# Patient Record
Sex: MALE | Race: BLACK OR AFRICAN AMERICAN | NOT HISPANIC OR LATINO | Employment: FULL TIME | URBAN - METROPOLITAN AREA
[De-identification: names, ages, dates, MRNs, and addresses within clinical notes are randomized per-mention and may not be internally consistent; named-entity substitution may affect disease eponyms.]

---

## 2017-01-02 ENCOUNTER — THERAPY VISIT (OUTPATIENT)
Dept: PHYSICAL THERAPY | Facility: CLINIC | Age: 42
End: 2017-01-02
Payer: COMMERCIAL

## 2017-01-02 DIAGNOSIS — M54.50 ACUTE BILATERAL LOW BACK PAIN WITHOUT SCIATICA: Primary | ICD-10-CM

## 2017-01-02 PROCEDURE — 97110 THERAPEUTIC EXERCISES: CPT | Mod: GP | Performed by: PHYSICAL THERAPIST

## 2017-01-02 PROCEDURE — 97161 PT EVAL LOW COMPLEX 20 MIN: CPT | Mod: GP | Performed by: PHYSICAL THERAPIST

## 2017-01-02 NOTE — PROGRESS NOTES
Subjective:    HPI Comments: Jeanmarie is here for his LBP/lower thoracic pain that started mid December.  He has seen Rene Ahn DC for a couple visits which has helped and is now coming to PT to learn exercises and techniques to avoid future flare ups.      Jeanmarie Mckinley is a 41 year old male with a lumbar condition.  Condition occurred with:  Insidious onset.  Condition occurred: for unknown reasons.    12/2016 -  Most recent exacerbation rolled over in bed and had acute LBP.    Patient reports pain:  Central lumbar spine and upper lumbar spine.    Pain is described as aching  and reported as 2/10.      and relieved by rest.  Since onset symptoms are unchanged.        General health as reported by patient is good.  Past medical history: asthma; diabetes Type II.        Current occupation .                                  Objective:    System         Lumbar/SI Evaluation  ROM:    AROM Lumbar:   Flexion:              Wnl  Ext:                    Mod restriction   Side Bend:        Left:  Wnl    Right:  Wnl  Rotation:           Left:  Wnl    Right:  Wnl  Side Glide:        Left:  Wnl    Right:  Wnl        Strength: weak lower abdominal and hip musculature  Lumbar Myotomes:    T12-L3 (Hip Flex):  Left: 5    Right: 5  L2-4 (Quads):  Left:  5    Right:  5  L4 (Ankle DF):  Left:  5    Right:  5  L5 (Great Toe Ext): Left: 5    Right: 5   S1 (Toe Raise):  Left: 5    Right: 5        Neural Tension/Mobility:      Left side:SLR w/DF or Slump  negative.     Right side:   SLR w/DF or Slump  negative.   Lumbar Palpation:      Tenderness not present at Left:    PSIS    Tenderness not present at Right:  PSIS                                                                                          Musculoskeletal:        Back:        ROS    Assessment/Plan:      Patient is a 41 year old male with lumbar complaints.    Patient has the following significant findings with corresponding treatment plan.                 Diagnosis 1:  LBP  Pain -  self management, education, directional preference exercise and home program  Decreased ROM/flexibility - manual therapy and therapeutic exercise  Decreased joint mobility - manual therapy and therapeutic exercise  Decreased strength - therapeutic exercise and therapeutic activities  Impaired muscle performance - neuro re-education  Decreased function - therapeutic activities  Impaired posture - neuro re-education    Therapy Evaluation Codes:   1) History comprised of:   Personal factors that impact the plan of care:      Past/current experiences.    Comorbidity factors that impact the plan of care are:      Overweight.     Medications impacting care: None.  2) Examination of Body Systems comprised of:   Body structures and functions that impact the plan of care:      Lumbar spine.   Activity limitations that impact the plan of care are:      Lifting.   Clinical presentation characteristics are:    Stable/Uncomplicated.  3) Presentation comprised of:   Presentation scored as Low complexity with uncomplicated characteristics..  4) Decision-Making    Low complexity using standardized patient assessment instrument and/or measureable assessment of functional outcome.  Cumulative Therapy Evaluation is: Low complexity.    Previous and current functional limitations:  (See Goal Flow Sheet for this information)    Short term and Long term goals: (See Goal Flow Sheet for this information)     Communication ability:  Patient appears to be able to clearly communicate and understand verbal and written communication and follow directions correctly.  Treatment Explanation - The following has been discussed with the patient:   RX ordered/plan of care  Anticipated outcomes  Possible risks and side effects  This patient would benefit from PT intervention to resume normal activities.   Rehab potential is good.    Frequency:  1 X week, once daily  Duration:  for 6   weeks  Discharge Plan:  Achieve all  LTG.  Independent in home treatment program.  Reach maximal therapeutic benefit.    Please refer to the daily flowsheet for treatment today, total treatment time and time spent performing 1:1 timed codes.

## 2017-01-03 NOTE — PROGRESS NOTES
Subjective:                                       Pertinent medical history includes:  Diabetes and asthma.    Other surgeries include:  Other (Broken Bone).  Current medications:  High blood pressure medication.  Current occupation    .    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            Oswestry Score: 2 %                 Objective:    System    Physical Exam    General     ROS

## 2017-01-09 ENCOUNTER — THERAPY VISIT (OUTPATIENT)
Dept: PHYSICAL THERAPY | Facility: CLINIC | Age: 42
End: 2017-01-09
Payer: COMMERCIAL

## 2017-01-09 DIAGNOSIS — M54.50 ACUTE BILATERAL LOW BACK PAIN WITHOUT SCIATICA: Primary | ICD-10-CM

## 2017-01-09 PROCEDURE — 97110 THERAPEUTIC EXERCISES: CPT | Mod: GP | Performed by: PHYSICAL THERAPIST

## 2017-01-17 ENCOUNTER — THERAPY VISIT (OUTPATIENT)
Dept: PHYSICAL THERAPY | Facility: CLINIC | Age: 42
End: 2017-01-17
Payer: COMMERCIAL

## 2017-01-17 DIAGNOSIS — M54.50 ACUTE BILATERAL LOW BACK PAIN WITHOUT SCIATICA: Primary | ICD-10-CM

## 2017-01-17 PROCEDURE — 97112 NEUROMUSCULAR REEDUCATION: CPT | Mod: GP | Performed by: PHYSICAL THERAPIST

## 2017-01-17 PROCEDURE — 97110 THERAPEUTIC EXERCISES: CPT | Mod: GP | Performed by: PHYSICAL THERAPIST

## 2017-02-12 DIAGNOSIS — E11.9 DIABETES MELLITUS TYPE 2, CONTROLLED (H): ICD-10-CM

## 2017-02-13 RX ORDER — PEN NEEDLE, DIABETIC 32GX 5/32"
NEEDLE, DISPOSABLE MISCELLANEOUS
Refills: 0
Start: 2017-02-13

## 2017-02-13 NOTE — TELEPHONE ENCOUNTER
Pending Prescriptions:                       Disp   Refills    BD VICENTE U/F 32G X 4 MM insulin pen needle*       0            Sig: USE ONCE DAILY             Last Written Prescription Date: 9/22/16  Last Fill Quantity: 1, # refills: prn  Last Office Visit with G, P or [facility] prescribing provider:  10/12/16 with Dr. Blanquita Butler, [facility]           BP Readings from Last 3 Encounters:   10/12/16 116/69   09/22/16 122/75   04/14/16 126/78     Lab Results   Component Value Date    MICROL 36 04/05/2016     No results found for: MICROALBUMIN  Creatinine   Date Value Ref Range Status   12/26/2016 1.07 0.66 - 1.25 mg/dL Final   ]  GFR Estimate   Date Value Ref Range Status   12/26/2016 76 >60 mL/min/1.7m2 Final     Comment:     Non  GFR Calc   09/22/2016 84 >60 mL/min/1.7m2 Final     Comment:     Non  GFR Calc   04/05/2016 >90  Non  GFR Calc   >60 mL/min/1.7m2 Final     GFR Estimate If Black   Date Value Ref Range Status   12/26/2016 >90   GFR Calc   >60 mL/min/1.7m2 Final   09/22/2016 >90   GFR Calc   >60 mL/min/1.7m2 Final   04/05/2016 >90   GFR Calc   >60 mL/min/1.7m2 Final     Lab Results   Component Value Date    CHOL 174 12/26/2016     Lab Results   Component Value Date    HDL 36 12/26/2016     Lab Results   Component Value Date    LDL 86 12/26/2016    LDL 92 04/05/2016     Lab Results   Component Value Date    TRIG 258 12/26/2016     No results found for: CHOLHDLRATIO  No results found for: AST  No results found for: ALT  Lab Results   Component Value Date    A1C 5.9 12/26/2016    A1C 8.1 09/22/2016    A1C 6.9 04/05/2016     Potassium   Date Value Ref Range Status   12/26/2016 4.3 3.4 - 5.3 mmol/L Final

## 2017-03-23 ENCOUNTER — OFFICE VISIT (OUTPATIENT)
Dept: PHARMACY | Facility: CLINIC | Age: 42
End: 2017-03-23
Payer: COMMERCIAL

## 2017-03-23 VITALS — WEIGHT: 227.2 LBS | DIASTOLIC BLOOD PRESSURE: 66 MMHG | BODY MASS INDEX: 32.6 KG/M2 | SYSTOLIC BLOOD PRESSURE: 118 MMHG

## 2017-03-23 DIAGNOSIS — E78.2 MIXED HYPERLIPIDEMIA: ICD-10-CM

## 2017-03-23 DIAGNOSIS — E63.9 NUTRITIONAL DEFICIENCY: ICD-10-CM

## 2017-03-23 DIAGNOSIS — E11.65 TYPE 2 DIABETES MELLITUS WITH HYPERGLYCEMIA, UNSPECIFIED LONG TERM INSULIN USE STATUS: ICD-10-CM

## 2017-03-23 DIAGNOSIS — I10 BENIGN ESSENTIAL HYPERTENSION: ICD-10-CM

## 2017-03-23 DIAGNOSIS — J45.998 ASTHMA, PERSISTENT CONTROLLED: ICD-10-CM

## 2017-03-23 DIAGNOSIS — E11.65 TYPE 2 DIABETES MELLITUS WITH HYPERGLYCEMIA, UNSPECIFIED LONG TERM INSULIN USE STATUS: Primary | ICD-10-CM

## 2017-03-23 PROCEDURE — 82043 UR ALBUMIN QUANTITATIVE: CPT | Performed by: FAMILY MEDICINE

## 2017-03-23 PROCEDURE — 99607 MTMS BY PHARM ADDL 15 MIN: CPT | Performed by: PHARMACIST

## 2017-03-23 PROCEDURE — 99605 MTMS BY PHARM NP 15 MIN: CPT | Performed by: PHARMACIST

## 2017-03-23 RX ORDER — OMEGA-3-ACID ETHYL ESTERS 1 G/1
1 CAPSULE, LIQUID FILLED ORAL DAILY
COMMUNITY
End: 2019-02-21

## 2017-03-23 NOTE — MR AVS SNAPSHOT
After Visit Summary   3/23/2017    Jeanmarie Mckinley    MRN: [id number]           Patient Information     Date Of Birth          1975        Visit Information        Provider Department      3/23/2017 4:00 PM Faith Montague, [facility] MTM        Today's Diagnoses     Type 2 diabetes mellitus with hyperglycemia, unspecified long term insulin use status (H)    -  1    Benign essential hypertension        Mixed hyperlipidemia        Nutritional deficiency        Asthma, persistent controlled          Care Instructions    Recommendations from today's MTM visit:                                                    MTM (medication therapy management) is a service provided by a clinical pharmacist designed to help you get the most of out of your medicines.   Today we reviewed what your medicines are for, how to know if they are working, that your medicines are safe and how to make your medicine regimen as easy as possible.     1. Check protein in urine today. We will wait until 6 months for the A1c.    2. Try to use Advair in evening and continue to rinse your mouth after using.         Next MTM visit: 6 months, sooner if needed    To schedule another MTM appointment, please call the clinic directly or you may call the MTM scheduling line at [phone] or toll-free at [phone].     My Clinical Pharmacist's contact information:                                                      It was a pleasure seeing you today!  Please feel free to contact me with any questions or concerns you have.      Lianne Hurley, PharmD  [facility] Resident  [phone]      You may receive a survey about the MTM services you received.  I would appreciate your feedback to help me serve you better in the future. Please fill it out and return it when you can. Your comments will be anonymous.                    Follow-ups after your visit        Who to contact     If you have questions or need  follow up information about today's clinic visit or your schedule please contact [facility] directly at [phone].  Normal or non-critical lab and imaging results will be communicated to you by MyChart, letter or phone within 4 business days after the clinic has received the results. If you do not hear from us within 7 days, please contact the clinic through MyChart or phone. If you have a critical or abnormal lab result, we will notify you by phone as soon as possible.  Submit refill requests through [email or web address] or call your pharmacy and they will forward the refill request to us. Please allow 3 business days for your refill to be completed.          Additional Information About Your Visit        QianmiharFrequent Browser Information     [email or web address] gives you secure access to your electronic health record. If you see a primary care provider, you can also send messages to your care team and make appointments. If you have questions, please call your primary care clinic.  If you do not have a primary care provider, please call [phone] and they will assist you.        Care EveryWhere ID     This is your Care EveryWhere ID. This could be used by other organizations to access your [place] medical records  [id number]        Your Vitals Were     BMI (Body Mass Index)                   32.6 kg/m2            Blood Pressure from Last 3 Encounters:   03/23/17 118/66   10/12/16 116/69   09/22/16 122/75    Weight from Last 3 Encounters:   03/23/17 227 lb 3.2 oz (103.1 kg)   10/12/16 236 lb 1.6 oz (107.1 kg)   09/22/16 237 lb 1.6 oz (107.5 kg)               Primary Care Provider Office Phone # Fax #    Krista Juares [phone]       [facility] [address] [address]        Thank you!     Thank you for choosing [facility]  for your care. Our goal is always to provide you with excellent care. Hearing back from our patients is one way we can continue to improve  our services. Please take a few minutes to complete the written survey that you may receive in the mail after your visit with us. Thank you!             Your Updated Medication List - Protect others around you: Learn how to safely use, store and throw away your medicines at www.disposemymeds.org.          This list is accurate as of: 3/23/17  4:45 PM.  Always use your most recent med list.                   Brand Name Dispense Instructions for use    ASPIRIN NOT PRESCRIBED    INTENTIONAL     Antiplatelet medication not prescribed intentionally due to Not indicated based on age       fluticasone-salmeterol 250-50 MCG/DOSE diskus inhaler    ADVAIR DISKUS    3 Inhaler    Inhale 1 puff into the lungs 2 times daily       FREESTYLE LITE test strip   Generic drug:  blood glucose monitoring     200 each    Test twice daily       insulin pen needle 32G X 4 MM    BD VICENTE U/F    1 each    Use once daily as directed.       liraglutide 18 MG/3ML soln    VICTOZA PEN    6 mL    1.2mg under the skin daily.       lisinopril 10 MG tablet    PRINIVIL/ZESTRIL    90 tablet    TAKE 1 TABLET BY MOUTH DAILY (EVERY 24 HOURS).       metFORMIN 500 MG 24 hr tablet    GLUCOPHAGE-XR    360 tablet    Take 2 tablets (1,000 mg) by mouth 2 times daily (with meals)       MULTIVITAMIN & MINERAL PO          omega-3 acid ethyl esters 1 G capsule    Lovaza         PROBIOTIC ACIDOPHILUS PO          simvastatin 40 MG tablet    ZOCOR    90 tablet    Take 1 tablet (40 mg) by mouth At Bedtime       VENTOLIN  (90 BASE) MCG/ACT Inhaler   Generic drug:  albuterol     1 each    Inhale 1 puff into the lungs every 6 hours as needed for shortness of breath / dyspnea or wheezing

## 2017-03-23 NOTE — PROGRESS NOTES
SUBJECTIVE/OBJECTIVE:                                                    Jeanmarie Mckinley is a 41 year old male coming in for a follow-up visit for Medication Therapy Management.  He was referred to me from Dr. Johnson.     Chief Complaint: Follow up from visit with Faith Montague, AndersD on 9/22/16.      Tobacco: No tobacco use   Alcohol: Less than 1 beverages / week    Medication Adherence: no issues reported    Diabetes:  Pt currently taking metformin 2,000 XR daily, Victoza 1.2mg daily. Pt is not experiencing side effects.  SMBG: not consistently. Usually tests 3 times in one day on days he tests.  Ranges (patient reported): did not bring glucometer today; usually around 100 mg/dL; even after meals.  Symptoms of low blood sugar? none. Frequency of hypoglycemia? never.  Recent symptoms of high blood sugar? none  Eye exam: up to date  Foot exam: due  Microalbumin is not < 30 mg/g. Pt is taking an ACEi/ARB.  Aspirin: Not taking due to patient age less than 50  Diet/Exercise: eliminated sugar, carbs, pasta for last one month. Is continuing to exercise.   Patient is pleased with his blood sugars and is motivated to keep up the changes he has made.     Asthma:  Current asthma medications: Fluticasone+Salmeterol (Advair) 250-50 mcg/dose prescribed BID - using Advair not always BID- sometimes missing bedtime dose, Ventolin PRN - patient estimates use 1-2 times in last couple months.   Pt rinses their mouth after using steroid inhaler. Asthma triggers include: upper respiratory infections and exercise or sports.   Pt reports the following symptoms: none  Completed ACT today.     AAP on file: YES  ACT Total Scores 4/5/2016 10/12/2016 10/12/2016   ACT TOTAL SCORE (Goal Greater than or Equal to 20) 21 22 22   In the past 12 months, how many times did you visit the emergency room for your asthma without being admitted to the hospital? 0 0 0   In the past 12 months, how many times were you hospitalized overnight  because of your asthma? 0 0 0       Hypertension: Current medications include lisinopril 10 mg daily.  Patient does not self-monitor BP.  Patient reports no current medication side effects.    Hyperlipidemia: Current therapy includes simvastatin 40 mg once daily.  Pt reports no significant myalgias or other side effects.   The 10-year ASCVD risk score (Rosy BOLIVAR Jr, et al., 2013) is: 9%    Values used to calculate the score:      Age: 41 years      Sex: Male      Is Non- : Yes      Diabetic: Yes      Tobacco smoker: No      Systolic Blood Pressure: 116 mmHg      Is BP treated: Yes      HDL Cholesterol: 36 mg/dL      Total Cholesterol: 174 mg/dL    Supplements: Currently taking MVI daily, proboitic daily, Omega 3 fatty acids (unknown dose) daily. Patient reports no side effects.    Current labs include:  BP Readings from Last 3 Encounters:   10/12/16 116/69   09/22/16 122/75   04/14/16 126/78     Today's Vitals: /66  Wt 227 lb 3.2 oz (103.1 kg)  BMI 32.6 kg/m2  Lab Results   Component Value Date    A1C 5.9 12/26/2016   .  Lab Results   Component Value Date    CHOL 174 12/26/2016     Lab Results   Component Value Date    TRIG 258 12/26/2016     Lab Results   Component Value Date    HDL 36 12/26/2016     Lab Results   Component Value Date    LDL 86 12/26/2016       Liver Function Studies - No results for input(s): PROTTOTAL, ALBUMIN, BILITOTAL, ALKPHOS, AST, ALT, BILIDIRECT in the last 97910 hours.    Lab Results   Component Value Date    UCRR 116 04/05/2016    MICROL 36 04/05/2016    UMALCR 30.86 (H) 04/05/2016       Last Basic Metabolic Panel:  Lab Results   Component Value Date     12/26/2016      Lab Results   Component Value Date    POTASSIUM 4.3 12/26/2016     Lab Results   Component Value Date    CHLORIDE 104 12/26/2016     Lab Results   Component Value Date    BUN 16 12/26/2016     Lab Results   Component Value Date    CR 1.07 12/26/2016     GFR Estimate   Date Value Ref  Range Status   12/26/2016 76 >60 mL/min/1.7m2 Final     Comment:     Non  GFR Calc   09/22/2016 84 >60 mL/min/1.7m2 Final     Comment:     Non  GFR Calc   04/05/2016 >90  Non  GFR Calc   >60 mL/min/1.7m2 Final     GFR Estimate If Black   Date Value Ref Range Status   12/26/2016 >90   GFR Calc   >60 mL/min/1.7m2 Final   09/22/2016 >90   GFR Calc   >60 mL/min/1.7m2 Final   04/05/2016 >90   GFR Calc   >60 mL/min/1.7m2 Final     TSH   Date Value Ref Range Status   04/05/2016 0.34 (L) 0.40 - 4.00 mU/L Final   ]    Most Recent Immunizations   Administered Date(s) Administered     Influenza (IIV3) 10/12/2016     Pneumococcal (PCV 13) 10/12/2016     TD (ADULT, 7+) 04/05/2016     ASSESSMENT:                                                    Current medications were reviewed today as discussed above.      Medication Adherence: needs improvement - see below    Diabetes: Stable. Patient is meeting A1c goal of < 7%. OK to wait another 3 months to check A1c as patient is well controlled.     Asthma: Needs Improvement. Up to date and at goal of ACT > or equal to 20. Needs improvement with compliance.    Hypertension: Needs Improvement. Patient is meeting BP goal of < 140/90mmHg. Patient would benefit from updated microalbumin today.     Hyperlipidemia: Stable. Pt is on moderate intensity statin which is indicated based on 2013 ACC/AHA guidelines for lipid management.      Supplements: Stable       PLAN:                                                      1. Lab today: Microalbumin  2. Reminded patient to take Advair BID as prescribed.     I spent 30 minutes with this patient today.  All changes were made via verbal approval with Dr. Praful Rincon. A copy of the visit note was provided to the patient's primary care provider.     Will follow up in 6 months.   Patient prefers lab result via [employer].     The patient was given a  summary of these recommendations as an after visit summary.    Lianne Hurley, PharmD  [facility] Resident  [phone]    Tie-in: Jeanmarie Mckinley was seen independently by Dr. Hurley. I have reviewed the assessment and plan. Faith Montague, AndersD, VITO, BCACP

## 2017-03-23 NOTE — PATIENT INSTRUCTIONS
Recommendations from today's MTM visit:                                                    MTM (medication therapy management) is a service provided by a clinical pharmacist designed to help you get the most of out of your medicines.   Today we reviewed what your medicines are for, how to know if they are working, that your medicines are safe and how to make your medicine regimen as easy as possible.     1. Check protein in urine today. We will wait until 6 months for the A1c.    2. Try to use Advair in evening and continue to rinse your mouth after using.         Next MTM visit: 6 months, sooner if needed    To schedule another MTM appointment, please call the clinic directly or you may call the MTM scheduling line at [phone] or toll-free at [phone].     My Clinical Pharmacist's contact information:                                                      It was a pleasure seeing you today!  Please feel free to contact me with any questions or concerns you have.      Lianne Hurley, PharmD  [facility] Res[facility]  [phone]      You may receive a survey about the MTM services you received.  I would appreciate your feedback to help me serve you better in the future. Please fill it out and return it when you can. Your comments will be anonymous.

## 2017-03-24 LAB
CREAT UR-MCNC: 96 MG/DL
MICROALBUMIN UR-MCNC: 13 MG/L
MICROALBUMIN/CREAT UR: 13.3 MG/G CR (ref 0–17)

## 2017-03-24 ASSESSMENT — ASTHMA QUESTIONNAIRES: ACT_TOTALSCORE: 24

## 2017-04-14 DIAGNOSIS — J45.998 ASTHMA, PERSISTENT CONTROLLED: ICD-10-CM

## 2017-04-17 NOTE — TELEPHONE ENCOUNTER
Pending Prescriptions:                       Disp   Refills    VENTOLIN  (90 BASE) MCG/ACT Inhale*18 Inh*1            Sig: INHALE 2 PUFFS BY MOUTH EVERY 4 HOURS AS NEEDED.    Last Written Prescription Date: 4/5/16  Last Fill Quantity: 1, # refills: 11    Last Office Visit with [facility], [facility] or [facility] prescribing provider:  10/12/2016   Future Office Visit:       Date of Last Asthma Action Plan Letter:   Asthma Action Plan Q1 Year    Topic Date Due     Asthma Action Plan - yearly  04/05/2017      Asthma Control Test:   ACT Total Scores 3/23/2017   ACT TOTAL SCORE (Goal Greater than or Equal to 20) 24   In the past 12 months, how many times did you visit the emergency room for your asthma without being admitted to the hospital? 0   In the past 12 months, how many times were you hospitalized overnight because of your asthma? 0       Date of Last Spirometry Test:   No results found for this or any previous visit.

## 2017-04-18 RX ORDER — ALBUTEROL SULFATE 90 UG/1
AEROSOL, METERED RESPIRATORY (INHALATION)
Qty: 1 INHALER | Refills: 2 | Status: SHIPPED | OUTPATIENT
Start: 2017-04-18 | End: 2019-07-15

## 2017-04-18 NOTE — TELEPHONE ENCOUNTER
Prescription approved per [facility] [facility].  Fatemeh OSORIO RN

## 2017-05-24 ENCOUNTER — OFFICE VISIT (OUTPATIENT)
Dept: FAMILY MEDICINE | Facility: CLINIC | Age: 42
End: 2017-05-24
Payer: COMMERCIAL

## 2017-05-24 VITALS
OXYGEN SATURATION: 99 % | BODY MASS INDEX: 30.18 KG/M2 | SYSTOLIC BLOOD PRESSURE: 128 MMHG | WEIGHT: 210.8 LBS | RESPIRATION RATE: 15 BRPM | HEART RATE: 83 BPM | HEIGHT: 70 IN | DIASTOLIC BLOOD PRESSURE: 76 MMHG | TEMPERATURE: 98.3 F

## 2017-05-24 DIAGNOSIS — J45.998 UNCONTROLLED PERSISTENT ASTHMA: ICD-10-CM

## 2017-05-24 DIAGNOSIS — J31.0 CHRONIC RHINITIS: Primary | ICD-10-CM

## 2017-05-24 DIAGNOSIS — E11.65 TYPE 2 DIABETES MELLITUS WITH HYPERGLYCEMIA, WITHOUT LONG-TERM CURRENT USE OF INSULIN (H): ICD-10-CM

## 2017-05-24 DIAGNOSIS — Z91.013 ALLERGY TO FISH: ICD-10-CM

## 2017-05-24 DIAGNOSIS — E78.2 MIXED HYPERLIPIDEMIA: ICD-10-CM

## 2017-05-24 LAB — HBA1C MFR BLD: 5.2 % (ref 4.3–6)

## 2017-05-24 PROCEDURE — 83036 HEMOGLOBIN GLYCOSYLATED A1C: CPT | Performed by: FAMILY MEDICINE

## 2017-05-24 PROCEDURE — 86003 ALLG SPEC IGE CRUDE XTRC EA: CPT | Mod: 59 | Performed by: FAMILY MEDICINE

## 2017-05-24 PROCEDURE — 80048 BASIC METABOLIC PNL TOTAL CA: CPT | Performed by: FAMILY MEDICINE

## 2017-05-24 PROCEDURE — 36415 COLL VENOUS BLD VENIPUNCTURE: CPT | Performed by: FAMILY MEDICINE

## 2017-05-24 PROCEDURE — 99214 OFFICE O/P EST MOD 30 MIN: CPT | Performed by: FAMILY MEDICINE

## 2017-05-24 PROCEDURE — 80061 LIPID PANEL: CPT | Performed by: FAMILY MEDICINE

## 2017-05-24 PROCEDURE — 99207 C FOOT EXAM  NO CHARGE: CPT | Performed by: FAMILY MEDICINE

## 2017-05-24 RX ORDER — MONTELUKAST SODIUM 10 MG/1
10 TABLET ORAL AT BEDTIME
Qty: 30 TABLET | Refills: 3 | Status: SHIPPED | OUTPATIENT
Start: 2017-05-24 | End: 2017-11-02

## 2017-05-24 RX ORDER — LORATADINE 10 MG/1
10 TABLET ORAL DAILY
Qty: 30 TABLET | Refills: 1 | Status: SHIPPED | OUTPATIENT
Start: 2017-05-24 | End: 2017-11-02

## 2017-05-24 RX ORDER — FLUTICASONE PROPIONATE 50 MCG
1-2 SPRAY, SUSPENSION (ML) NASAL DAILY
Qty: 1 G | Refills: 1 | Status: SHIPPED | OUTPATIENT
Start: 2017-05-24 | End: 2017-11-02

## 2017-05-24 NOTE — MR AVS SNAPSHOT
After Visit Summary   5/24/2017    Jeanmarie Mckinley    MRN: [id number]           Patient Information     Date Of Birth          1975        Visit Information        Provider Department      5/24/2017 10:00 AM Krista Juares MD [facility]        Today's Diagnoses     Chronic rhinitis    -  1    Uncontrolled persistent asthma        Mixed hyperlipidemia        Type 2 diabetes mellitus with hyperglycemia, without long-term current use of insulin (H)        Allergy to fish          Care Instructions    Increase the dose of advair from  250 to now 500- use new inhaler- twice daily   Wash mouth after use    Plan is to go back to lower dose in a moth- fp in 4-6 weeks  Add singular bedtime or once daily- that may improve asthma     Add flonase nasal spray and calritin once daily           Follow-ups after your visit        Follow-up notes from your care team     Return in about 4 weeks (around 6/21/2017).      Who to contact     If you have questions or need follow up information about today's clinic visit or your schedule please contact [facility] directly at [phone].  Normal or non-critical lab and imaging results will be communicated to you by Qazzowhart, letter or phone within 4 business days after the clinic has received the results. If you do not hear from us within 7 days, please contact the clinic through [facility] or phone. If you have a critical or abnormal lab result, we will notify you by phone as soon as possible.  Submit refill requests through [facility] or call your pharmacy and they will forward the refill request to us. Please allow 3 business days for your refill to be completed.          Additional Information About Your Visit        MyChart Information     [facility] gives you secure access to your electronic health record. If you see a primary care provider, you can also send messages to your care team and make appointments. If you have questions, please call  "your primary care clinic.  If you do not have a primary care provider, please call [phone] and they will assist you.        Care EveryWhere ID     This is your Care EveryWhere ID. This could be used by other organizations to access your [place] medical records  [id number]        Your Vitals Were     Pulse Temperature Respirations Height Pulse Oximetry BMI (Body Mass Index)    83 98.3  F (36.8  C) (Oral) 15 5' 10\" (1.778 m) 99% 30.25 kg/m2       Blood Pressure from Last 3 Encounters:   05/24/17 128/76   03/23/17 118/66   10/12/16 116/69    Weight from Last 3 Encounters:   05/24/17 210 lb 12.8 oz (95.6 kg)   03/23/17 227 lb 3.2 oz (103.1 kg)   10/12/16 236 lb 1.6 oz (107.1 kg)              We Performed the Following     Allergen catfish IgE     Allergen salmon IgE     Allergen Trout IgE     Allergen Walleye Ray IgE     Asthma Action Plan (AAP)     Basic metabolic panel     C FOOT EXAM  NO CHARGE     Hemoglobin A1c     Lipid panel reflex to direct LDL          Today's Medication Changes          These changes are accurate as of: 5/24/17 11:59 PM.  If you have any questions, ask your nurse or doctor.               Start taking these medicines.        Dose/Directions    fluticasone 50 MCG/ACT spray   Commonly known as:  FLONASE   Used for:  Chronic rhinitis   Started by:  Krista Juares MD        Dose:  1-2 spray   Spray 1-2 sprays into both nostrils daily   Quantity:  1 g   Refills:  1       loratadine 10 MG tablet   Commonly known as:  CLARITIN   Used for:  Chronic rhinitis   Started by:  Krista Juares MD        Dose:  10 mg   Take 1 tablet (10 mg) by mouth daily   Quantity:  30 tablet   Refills:  1       montelukast 10 MG tablet   Commonly known as:  SINGULAIR   Used for:  Chronic rhinitis   Started by:  Krista Juares MD        Dose:  10 mg   Take 1 tablet (10 mg) by mouth At Bedtime   Quantity:  30 tablet   Refills:  3         These medicines have changed or have updated prescriptions.  "       Dose/Directions    * fluticasone-salmeterol 250-50 MCG/DOSE diskus inhaler   Commonly known as:  ADVAIR DISKUS   This may have changed:  Another medication with the same name was added. Make sure you understand how and when to take each.   Used for:  Asthma, persistent controlled   Changed by:  Faith Montague [facility]        Dose:  1 puff   Inhale 1 puff into the lungs 2 times daily   Quantity:  3 Inhaler   Refills:  1       * fluticasone-salmeterol 500-50 MCG/DOSE diskus inhaler   Commonly known as:  ADVAIR   This may have changed:  You were already taking a medication with the same name, and this prescription was added. Make sure you understand how and when to take each.   Used for:  Uncontrolled persistent asthma   Changed by:  Krista Juares MD        Dose:  1 puff   Inhale 1 puff into the lungs 2 times daily   Quantity:  1 Inhaler   Refills:  1       * Notice:  This list has 2 medication(s) that are the same as other medications prescribed for you. Read the directions carefully, and ask your doctor or other care provider to review them with you.         Where to get your medicines      These medications were sent to [facility]/pharmacy #[id number] - [place], [place]  [id number] [facility] [address]     Phone:  [phone]     fluticasone 50 MCG/ACT spray    fluticasone-salmeterol 500-50 MCG/DOSE diskus inhaler    loratadine 10 MG tablet    montelukast 10 MG tablet                Primary Care Provider Office Phone # Fax #    Krista Juares [phone]       [facility] [address] [address]        Thank you!     Thank you for choosing [facility]  for your care. Our goal is always to provide you with excellent care. Hearing back from our patients is one way we can continue to improve our services. Please take a few minutes to complete the written survey that you may receive in the mail after your visit with us. Thank you!              Your Updated Medication List - Protect others around you: Learn how to safely use, store and throw away your medicines at www.disposemymeds.org.          This list is accurate as of: 5/24/17 11:59 PM.  Always use your most recent med list.                   Brand Name Dispense Instructions for use    ASPIRIN NOT PRESCRIBED    INTENTIONAL     Antiplatelet medication not prescribed intentionally due to Not indicated based on age       fluticasone 50 MCG/ACT spray    FLONASE    1 g    Spray 1-2 sprays into both nostrils daily       * fluticasone-salmeterol 250-50 MCG/DOSE diskus inhaler    ADVAIR DISKUS    3 Inhaler    Inhale 1 puff into the lungs 2 times daily       * fluticasone-salmeterol 500-50 MCG/DOSE diskus inhaler    ADVAIR    1 Inhaler    Inhale 1 puff into the lungs 2 times daily       FREESTYLE LITE test strip   Generic drug:  blood glucose monitoring     200 each    Test twice daily       insulin pen needle 32G X 4 MM    BD VICENTE U/F    1 each    Use once daily as directed.       liraglutide 18 MG/3ML soln    VICTOZA PEN    6 mL    1.2mg under the skin daily.       lisinopril 10 MG tablet    PRINIVIL/ZESTRIL    90 tablet    TAKE 1 TABLET BY MOUTH DAILY (EVERY 24 HOURS).       loratadine 10 MG tablet    CLARITIN    30 tablet    Take 1 tablet (10 mg) by mouth daily       metFORMIN 500 MG 24 hr tablet    GLUCOPHAGE-XR    360 tablet    Take 2 tablets (1,000 mg) by mouth 2 times daily (with meals)       montelukast 10 MG tablet    SINGULAIR    30 tablet    Take 1 tablet (10 mg) by mouth At Bedtime       MULTIVITAMIN & MINERAL PO          omega-3 acid ethyl esters 1 G capsule    Lovaza         PROBIOTIC ACIDOPHILUS PO          simvastatin 40 MG tablet    ZOCOR    90 tablet    Take 1 tablet (40 mg) by mouth At Bedtime       VENTOLIN  (90 BASE) MCG/ACT Inhaler   Generic drug:  albuterol     1 Inhaler    INHALE 2 PUFFS BY MOUTH EVERY 4 HOURS AS NEEDED.       * Notice:  This list has 2 medication(s) that  are the same as other medications prescribed for you. Read the directions carefully, and ask your doctor or other care provider to review them with you.

## 2017-05-24 NOTE — PROGRESS NOTES
"  SUBJECTIVE:                                                    Jeanmarie Mckinley is a 41 year old male who presents to clinic today for the following health issues:      RESPIRATORY SYMPTOMS/COUGH      Duration: 6 weeks    Description  cough    Severity: moderate    Accompanying signs and symptoms: None    History (predisposing factors):  asthma    Precipitating or alleviating factors: None    Therapies tried and outcome:  Inhalers    Has been coughing and wheezing more    Has been using advair twice daily          Follow up A1C-   Walking more, eating healthy  Check bood sugar 3 times a week  Average within normal limits  Denies any concerns with Diabetes       History of fresh fish water allergy- had hives, would like them checked    Problem list and histories reviewed & adjusted, as indicated.  Additional history: as documented    Labs reviewed in EPIC    Reviewed and updated as needed this visit by clinical staff  Tobacco  Allergies  Meds  Problems  Med Hx  Surg Hx  Fam Hx  Soc Hx        Reviewed and updated as needed this visit by Provider         ROS:  Constitutional, HEENT, cardiovascular, pulmonary, gi and gu systems are negative, except as otherwise noted.    OBJECTIVE:                                                    /76  Pulse 83  Temp 98.3  F (36.8  C) (Oral)  Resp 15  Ht 5' 10\" (1.778 m)  Wt 210 lb 12.8 oz (95.6 kg)  SpO2 99%  BMI 30.25 kg/m2  Body mass index is 30.25 kg/(m^2).  GENERAL: healthy, alert and no distress  NECK: no adenopathy, no asymmetry, masses, or scars and thyroid normal to palpation  RESP: lungs clear to auscultation - no rales, rhonchi or wheezes  CV: regular rate and rhythm, normal S1 S2, no S3 or S4, no murmur, click or rub, no peripheral edema and peripheral pulses strong  ABDOMEN: soft, nontender, no hepatosplenomegaly, no masses and bowel sounds normal  MS: no gross musculoskeletal defects noted, no edema  Foot exam - both sides normal; no swelling, " tenderness or skin or vascular lesions. Color and temperature is normal. Sensation is intact. Peripheral pulses are palpable. Toenails are normal.    Diagnostic Test Results:     ASSESSMENT/PLAN:                                                      (J31.0) Chronic rhinitis  (primary encounter diagnosis)*  Plan: loratadine (CLARITIN) 10 MG tablet,   fluticasone(FLONASE) 50 MCG/ACT spray,   montelukast(SINGULAIR) 10 MG tablet            (J45.998) Uncontrolled persistent asthma  Comment:i do recommend increase the dose of Advair  Continue with singular and follow up in 4 weeks for recheck on uncontrolled asthma   Plan: fluticasone-salmeterol (ADVAIR) 500-50 MCG/DOSE        diskus inhaler        Continue with albuterol four times daily as needed    ACT Total Scores 10/12/2016 3/23/2017 5/24/2017   ACT TOTAL SCORE (Goal Greater than or Equal to 20) 22 24 9   In the past 12 months, how many times did you visit the emergency room for your asthma without being admitted to the hospital? 0 0 0   In the past 12 months, how many times were you hospitalized overnight because of your asthma? 0 0 0     (E78.2) Mixed hyperlipidemia  Plan: simvastatin 40 mg bedtime     (E11.65) Type 2 diabetes mellitus with hyperglycemia, without long-term current use of insulin (H)  Plan: Controlled on metformin 1000 mg twice daily , Victoza 1.2 mg once daily  Continue with same    FOOT EXAM no concerns  Labs rechecked and pending , Lipid panel reflex to       direct LDL, Basic metabolic panel,        Lab Results   Component Value Date    A1C 5.2 05/24/2017    A1C 5.9 12/26/2016    A1C 8.1 09/22/2016    A1C 6.9 04/05/2016     Hypertension- controlled on lisinopril 10  Mg once daily     (Z91.013) Allergy to fish  Comment:  Plan: Allergy adult food panel          The patient indicates understanding of these issues and agrees with the plan.      Krista Juares MD  [facility]

## 2017-05-24 NOTE — PATIENT INSTRUCTIONS
Increase the dose of advair from  250 to now 500- use new inhaler- twice daily   Wash mouth after use    Plan is to go back to lower dose in a moth- fp in 4-6 weeks  Add singular bedtime or once daily- that may improve asthma     Add flonase nasal spray and calritin once daily

## 2017-05-25 LAB
ANION GAP SERPL CALCULATED.3IONS-SCNC: 9 MMOL/L (ref 3–14)
BUN SERPL-MCNC: 16 MG/DL (ref 7–30)
CALCIUM SERPL-MCNC: 9.5 MG/DL (ref 8.5–10.1)
CHLORIDE SERPL-SCNC: 105 MMOL/L (ref 94–109)
CHOLEST SERPL-MCNC: 133 MG/DL
CO2 SERPL-SCNC: 25 MMOL/L (ref 20–32)
CREAT SERPL-MCNC: 1.05 MG/DL (ref 0.66–1.25)
GFR SERPL CREATININE-BSD FRML MDRD: 78 ML/MIN/1.7M2
GLUCOSE SERPL-MCNC: 93 MG/DL (ref 70–99)
HDLC SERPL-MCNC: 54 MG/DL
LDLC SERPL CALC-MCNC: 65 MG/DL
NONHDLC SERPL-MCNC: 79 MG/DL
POTASSIUM SERPL-SCNC: 4.1 MMOL/L (ref 3.4–5.3)
SODIUM SERPL-SCNC: 139 MMOL/L (ref 133–144)
TRIGL SERPL-MCNC: 72 MG/DL

## 2017-05-25 ASSESSMENT — ASTHMA QUESTIONNAIRES: ACT_TOTALSCORE: 9

## 2017-05-31 LAB
CATFISH IGE QN: NORMAL KU(A)/L
SALMON IGE QN: NORMAL KU(A)/L
TROUT IGE QN: NORMAL KU(A)/L
WALLEYE PIKE IGE QN: NORMAL KU(A)/L

## 2017-05-31 NOTE — PROGRESS NOTES
Listed fish allergy appears negative  Seeing allergist- is an option if questions or concerns    -Kidney function (GFR) is normal.  -Sodium is normal.  -Potassium is normal.  -Cholesterol levels are at your goal levels.  ADVISE: Continuing your medication, a regular exercise program with at least 30 minutes of aerobic exercise 3-4 days/week ( 45 minutes 4-6 days/week if weight loss needed), and a low saturated fat,/low carbohydrate diet are helpful to maintain this.  Rechecking your fasting cholesterol panel in 12 months is recommended (Lipid w/ LDL reflex).  -A1C (test of diabetes control the last 2-3 months) is at your goal. Please continue with current plan. Also, see me and recheck your A1C test in 6 months.

## 2017-06-10 DIAGNOSIS — E11.65 TYPE 2 DIABETES MELLITUS WITH HYPERGLYCEMIA, UNSPECIFIED LONG TERM INSULIN USE STATUS: ICD-10-CM

## 2017-06-12 RX ORDER — LIRAGLUTIDE 6 MG/ML
INJECTION SUBCUTANEOUS
Qty: 6 ML | Refills: 0 | Status: SHIPPED | OUTPATIENT
Start: 2017-06-12 | End: 2017-07-15

## 2017-06-12 NOTE — TELEPHONE ENCOUNTER
Prescription approved per [facility] Refill Protocol.  Alison Wei RN

## 2017-06-12 NOTE — TELEPHONE ENCOUNTER
Victoza Pen        Med is disc.  Last Written Prescription Date: 09/22/2016  Last Fill Quantity: 3ml, # refills: 0  Last Office Visit with G, P or [facility] prescribing provider:  05/24/2017        BP Readings from Last 3 Encounters:   05/24/17 128/76   03/23/17 118/66   10/12/16 116/69     Lab Results   Component Value Date    MICROL 13 03/23/2017     Lab Results   Component Value Date    UMALCR 13.30 03/23/2017     Creatinine   Date Value Ref Range Status   05/24/2017 1.05 0.66 - 1.25 mg/dL Final   ]  GFR Estimate   Date Value Ref Range Status   05/24/2017 78 >60 mL/min/1.7m2 Final     Comment:     Non  GFR Calc   12/26/2016 76 >60 mL/min/1.7m2 Final     Comment:     Non  GFR Calc   09/22/2016 84 >60 mL/min/1.7m2 Final     Comment:     Non  GFR Calc     GFR Estimate If Black   Date Value Ref Range Status   05/24/2017 >90   GFR Calc   >60 mL/min/1.7m2 Final   12/26/2016 >90   GFR Calc   >60 mL/min/1.7m2 Final   09/22/2016 >90   GFR Calc   >60 mL/min/1.7m2 Final     Lab Results   Component Value Date    CHOL 133 05/24/2017     Lab Results   Component Value Date    HDL 54 05/24/2017     Lab Results   Component Value Date    LDL 65 05/24/2017     Lab Results   Component Value Date    TRIG 72 05/24/2017     No results found for: CHOLHDLRATIO  No results found for: AST  No results found for: ALT  Lab Results   Component Value Date    A1C 5.2 05/24/2017    A1C 5.9 12/26/2016    A1C 8.1 09/22/2016    A1C 6.9 04/05/2016     Potassium   Date Value Ref Range Status   05/24/2017 4.1 3.4 - 5.3 mmol/L Final

## 2017-06-20 DIAGNOSIS — E11.65 TYPE 2 DIABETES MELLITUS WITH HYPERGLYCEMIA, UNSPECIFIED LONG TERM INSULIN USE STATUS: ICD-10-CM

## 2017-06-20 RX ORDER — LIRAGLUTIDE 6 MG/ML
INJECTION SUBCUTANEOUS
Start: 2017-06-20

## 2017-06-22 DIAGNOSIS — E78.2 MIXED HYPERLIPIDEMIA: ICD-10-CM

## 2017-06-22 RX ORDER — SIMVASTATIN 40 MG
TABLET ORAL
Qty: 90 TABLET | Refills: 1 | Status: SHIPPED | OUTPATIENT
Start: 2017-06-22 | End: 2018-01-17

## 2017-06-22 NOTE — TELEPHONE ENCOUNTER
Simvastatin     Last Written Prescription Date: 12-26-16  Last Fill Quantity: 90, # refills: 1  [place] [facility] or [facility] prescribing provider: 5-24-17       Lab Results   Component Value Date    CHOL 133 05/24/2017     Lab Results   Component Value Date    HDL 54 05/24/2017     Lab Results   Component Value Date    LDL 65 05/24/2017     Lab Results   Component Value Date    TRIG 72 05/24/2017     No results found for: CHOLGUERO

## 2017-06-22 NOTE — TELEPHONE ENCOUNTER
Prescription approved per [facility] Refill Protocol.  Alison Wei RN

## 2017-07-14 DIAGNOSIS — I10 BENIGN ESSENTIAL HYPERTENSION: ICD-10-CM

## 2017-07-15 DIAGNOSIS — E11.65 TYPE 2 DIABETES MELLITUS WITH HYPERGLYCEMIA, UNSPECIFIED LONG TERM INSULIN USE STATUS: ICD-10-CM

## 2017-07-17 RX ORDER — LISINOPRIL 10 MG/1
TABLET ORAL
Qty: 90 TABLET | Refills: 2 | Status: SHIPPED | OUTPATIENT
Start: 2017-07-17 | End: 2018-06-25

## 2017-07-17 RX ORDER — LIRAGLUTIDE 6 MG/ML
INJECTION SUBCUTANEOUS
Qty: 6 ML | Refills: 1 | Status: SHIPPED | OUTPATIENT
Start: 2017-07-17 | End: 2017-09-17

## 2017-07-17 NOTE — TELEPHONE ENCOUNTER
Prescription approved per [facility] Refill Protocol.  Alison Wei RN.

## 2017-07-17 NOTE — TELEPHONE ENCOUNTER
Pending Prescriptions:                       Disp   Refills    VICTOZA PEN 18 MG/3ML soln [Pharmacy Med *       0            Sig: INJECT 1.2MG UNDER THE SKIN DAILY           Last Written Prescription Date: 6/12/2017  Last Fill Quantity: 6 mL, # refills: 0   Last Office Visit with G, P or [facility] prescribing provider:  5/24/2017        BP Readings from Last 3 Encounters:   05/24/17 128/76   03/23/17 118/66   10/12/16 116/69     Lab Results   Component Value Date    MICROL 13 03/23/2017     Lab Results   Component Value Date    UMALCR 13.30 03/23/2017     Creatinine   Date Value Ref Range Status   05/24/2017 1.05 0.66 - 1.25 mg/dL Final   ]  GFR Estimate   Date Value Ref Range Status   05/24/2017 78 >60 mL/min/1.7m2 Final     Comment:     Non  GFR Calc   12/26/2016 76 >60 mL/min/1.7m2 Final     Comment:     Non  GFR Calc   09/22/2016 84 >60 mL/min/1.7m2 Final     Comment:     Non  GFR Calc     GFR Estimate If Black   Date Value Ref Range Status   05/24/2017 >90   GFR Calc   >60 mL/min/1.7m2 Final   12/26/2016 >90   GFR Calc   >60 mL/min/1.7m2 Final   09/22/2016 >90   GFR Calc   >60 mL/min/1.7m2 Final     Lab Results   Component Value Date    CHOL 133 05/24/2017     Lab Results   Component Value Date    HDL 54 05/24/2017     Lab Results   Component Value Date    LDL 65 05/24/2017     Lab Results   Component Value Date    TRIG 72 05/24/2017     No results found for: CHOLHDLRATIO  No results found for: AST  No results found for: ALT  Lab Results   Component Value Date    A1C 5.2 05/24/2017    A1C 5.9 12/26/2016    A1C 8.1 09/22/2016    A1C 6.9 04/05/2016     Potassium   Date Value Ref Range Status   05/24/2017 4.1 3.4 - 5.3 mmol/L Final

## 2017-07-17 NOTE — TELEPHONE ENCOUNTER
Pending Prescriptions:                       Disp   Refills    lisinopril (PRINIVIL/ZESTRIL) 10 MG table*90 tab*1            Sig: TAKE 1 TABLET BY MOUTH DAILY (EVERY 24 HOURS).        Last Written Prescription Date: 12/26/2016  Last Fill Quantity: 90, # refills: 1  Last Office Visit with [facility], P or [facility] prescribing provider: 5/24/2017       Potassium   Date Value Ref Range Status   05/24/2017 4.1 3.4 - 5.3 mmol/L Final     Creatinine   Date Value Ref Range Status   05/24/2017 1.05 0.66 - 1.25 mg/dL Final     BP Readings from Last 3 Encounters:   05/24/17 128/76   03/23/17 118/66   10/12/16 116/69

## 2017-09-17 DIAGNOSIS — E11.65 TYPE 2 DIABETES MELLITUS WITH HYPERGLYCEMIA, UNSPECIFIED LONG TERM INSULIN USE STATUS: ICD-10-CM

## 2017-09-18 RX ORDER — LIRAGLUTIDE 6 MG/ML
1.2 INJECTION SUBCUTANEOUS DAILY
Qty: 6 ML | Refills: 0 | Status: SHIPPED | OUTPATIENT
Start: 2017-09-18 | End: 2017-10-23

## 2017-09-18 NOTE — TELEPHONE ENCOUNTER
Pending Prescriptions:                       Disp   Refills    liraglutide (VICTOZA PEN) 18 MG/3ML soln *       1            Si.2 mg             Last Written Prescription Date: 17  Last Fill Quantity: 1, # refills: 0  Last Office Visit with G, [facility] or [facility] prescribing provider:  17 with AS        BP Readings from Last 3 Encounters:   17 128/76   17 118/66   10/12/16 116/69     Lab Results   Component Value Date    MICROL 13 2017     Lab Results   Component Value Date    UMALCR 13.30 2017     Creatinine   Date Value Ref Range Status   2017 1.05 0.66 - 1.25 mg/dL Final   ]  GFR Estimate   Date Value Ref Range Status   2017 78 >60 mL/min/1.7m2 Final     Comment:     Non  GFR Calc   2016 76 >60 mL/min/1.7m2 Final     Comment:     Non  GFR Calc   2016 84 >60 mL/min/1.7m2 Final     Comment:     Non  GFR Calc     GFR Estimate If Black   Date Value Ref Range Status   2017 >90   GFR Calc   >60 mL/min/1.7m2 Final   2016 >90   GFR Calc   >60 mL/min/1.7m2 Final   2016 >90   GFR Calc   >60 mL/min/1.7m2 Final     Lab Results   Component Value Date    CHOL 133 2017     Lab Results   Component Value Date    HDL 54 2017     Lab Results   Component Value Date    LDL 65 2017     Lab Results   Component Value Date    TRIG 72 2017     No results found for: CHOLHDLRATIO  No results found for: AST  No results found for: ALT  Lab Results   Component Value Date    A1C 5.2 2017    A1C 5.9 2016    A1C 8.1 2016    A1C 6.9 2016     Potassium   Date Value Ref Range Status   2017 4.1 3.4 - 5.3 mmol/L Final     Cristel Butler MA  [facility]

## 2017-09-18 NOTE — TELEPHONE ENCOUNTER
Prescription approved per [facility] Refill Protocol.  Alison Wei RN

## 2017-10-19 DIAGNOSIS — I10 BENIGN ESSENTIAL HYPERTENSION: ICD-10-CM

## 2017-10-19 RX ORDER — LISINOPRIL 10 MG/1
TABLET ORAL
Start: 2017-10-19

## 2017-10-23 DIAGNOSIS — E11.65 TYPE 2 DIABETES MELLITUS WITH HYPERGLYCEMIA, UNSPECIFIED LONG TERM INSULIN USE STATUS: ICD-10-CM

## 2017-10-23 RX ORDER — LIRAGLUTIDE 6 MG/ML
INJECTION SUBCUTANEOUS
Qty: 6 ML | Refills: 0 | Status: SHIPPED | OUTPATIENT
Start: 2017-10-23 | End: 2017-11-10

## 2017-10-23 NOTE — TELEPHONE ENCOUNTER
Prescription approved per [facility] Refill Protocol.  Alison Wei RN

## 2017-10-27 DIAGNOSIS — E11.9 CONTROLLED TYPE 2 DIABETES MELLITUS WITHOUT COMPLICATION, WITHOUT LONG-TERM CURRENT USE OF INSULIN (H): ICD-10-CM

## 2017-10-30 RX ORDER — PEN NEEDLE, DIABETIC 32GX 5/32"
NEEDLE, DISPOSABLE MISCELLANEOUS
Qty: 100 EACH | Refills: 0 | Status: SHIPPED | OUTPATIENT
Start: 2017-10-30 | End: 2019-03-02

## 2017-11-02 ENCOUNTER — OFFICE VISIT (OUTPATIENT)
Dept: FAMILY MEDICINE | Facility: CLINIC | Age: 42
End: 2017-11-02
Payer: COMMERCIAL

## 2017-11-02 ENCOUNTER — OFFICE VISIT (OUTPATIENT)
Dept: PHARMACY | Facility: CLINIC | Age: 42
End: 2017-11-02
Payer: COMMERCIAL

## 2017-11-02 VITALS — WEIGHT: 232.5 LBS | SYSTOLIC BLOOD PRESSURE: 132 MMHG | BODY MASS INDEX: 33.36 KG/M2 | DIASTOLIC BLOOD PRESSURE: 72 MMHG

## 2017-11-02 VITALS
DIASTOLIC BLOOD PRESSURE: 72 MMHG | HEART RATE: 90 BPM | RESPIRATION RATE: 14 BRPM | WEIGHT: 232 LBS | BODY MASS INDEX: 33.21 KG/M2 | OXYGEN SATURATION: 100 % | SYSTOLIC BLOOD PRESSURE: 132 MMHG | HEIGHT: 70 IN

## 2017-11-02 DIAGNOSIS — I10 BENIGN ESSENTIAL HYPERTENSION: ICD-10-CM

## 2017-11-02 DIAGNOSIS — J30.2 SEASONAL ALLERGIC RHINITIS, UNSPECIFIED CHRONICITY, UNSPECIFIED TRIGGER: ICD-10-CM

## 2017-11-02 DIAGNOSIS — E78.2 MIXED HYPERLIPIDEMIA: ICD-10-CM

## 2017-11-02 DIAGNOSIS — J45.998 ASTHMA, PERSISTENT CONTROLLED: ICD-10-CM

## 2017-11-02 DIAGNOSIS — E11.65 TYPE 2 DIABETES MELLITUS WITH HYPERGLYCEMIA, WITHOUT LONG-TERM CURRENT USE OF INSULIN (H): Primary | ICD-10-CM

## 2017-11-02 DIAGNOSIS — E11.65 TYPE 2 DIABETES MELLITUS WITH HYPERGLYCEMIA, WITHOUT LONG-TERM CURRENT USE OF INSULIN (H): ICD-10-CM

## 2017-11-02 DIAGNOSIS — J30.2 SEASONAL ALLERGIC RHINITIS: ICD-10-CM

## 2017-11-02 DIAGNOSIS — E63.9 NUTRITIONAL DEFICIENCY: ICD-10-CM

## 2017-11-02 DIAGNOSIS — B37.89 CANDIDA RASH OF GROIN: Primary | ICD-10-CM

## 2017-11-02 LAB — HBA1C MFR BLD: 5.1 % (ref 4.3–6)

## 2017-11-02 PROCEDURE — 83036 HEMOGLOBIN GLYCOSYLATED A1C: CPT | Performed by: FAMILY MEDICINE

## 2017-11-02 PROCEDURE — 99213 OFFICE O/P EST LOW 20 MIN: CPT | Performed by: FAMILY MEDICINE

## 2017-11-02 PROCEDURE — 36415 COLL VENOUS BLD VENIPUNCTURE: CPT | Performed by: FAMILY MEDICINE

## 2017-11-02 PROCEDURE — 99606 MTMS BY PHARM EST 15 MIN: CPT | Performed by: PHARMACIST

## 2017-11-02 PROCEDURE — 99607 MTMS BY PHARM ADDL 15 MIN: CPT | Performed by: PHARMACIST

## 2017-11-02 RX ORDER — CLOTRIMAZOLE 1 %
CREAM (GRAM) TOPICAL 2 TIMES DAILY
Qty: 30 G | Refills: 0 | Status: SHIPPED | OUTPATIENT
Start: 2017-11-02 | End: 2019-02-21

## 2017-11-02 RX ORDER — CLOTRIMAZOLE 1 %
CREAM (GRAM) TOPICAL 2 TIMES DAILY
Qty: 30 G | Refills: 0 | Status: SHIPPED | OUTPATIENT
Start: 2017-11-02 | End: 2017-11-02

## 2017-11-02 NOTE — MR AVS SNAPSHOT
After Visit Summary   11/2/2017    Jeanmarie Mckinley    MRN: [id number]           Patient Information     Date Of Birth          1975        Visit Information        Provider Department      11/2/2017 2:30 PM Faith Montague, [facility] MTM        Today's Diagnoses     Type 2 diabetes mellitus with hyperglycemia, without long-term current use of insulin (H)    -  1    Asthma, persistent controlled        Benign essential hypertension        Mixed hyperlipidemia        Nutritional deficiency        Seasonal allergic rhinitis        Seasonal allergic rhinitis, unspecified chronicity, unspecified trigger          Care Instructions    Recommendations from today's MT visit:                                                      1. The fish oil and probiotic may not be providing a lot of benefit for the money you are paying. It's up to you to restart them if you want.     2. We'll check your A1c today- the 3 month average of your blood sugars. Faith or Kortney will MyChart you your results.     Next MTM visit: 6 months    To schedule another MTM appointment, please call the clinic directly or you may call the MTM scheduling line at [phone] or toll-free at [phone].     My Clinical Pharmacist's contact information:                                                      It was a pleasure seeing you today!  Please feel free to contact me with any questions or concerns you have.      Faith Montague, Pharm.D., M.B.A., [facility]  MT Pharmacist, [facility]  Pager: [phone]  Email: [email or web address]     You may receive a survey about the MTM services you received.  I would appreciate your feedback to help me serve you better in the future. Please fill it out and return it when you can. Your comments will be anonymous.              Follow-ups after your visit        Who to contact     If you have questions or need follow up information about [facility]  visit or your schedule please contact [facility] MTM directly at [phone].  Normal or non-critical lab and imaging results will be communicated to you by MyChart, letter or phone within 4 business days after the clinic has received the results. If you do not hear from us within 7 days, please contact the clinic through Wellcoinhart or phone. If you have a critical or abnormal lab result, we will notify you by phone as soon as possible.  Submit refill requests through Appoxee or call your pharmacy and they will forward the refill request to us. Please allow 3 business days for your refill to be completed.          Additional Information About Your Visit        Wellcoinhart Information     Appoxee gives you secure access to your electronic health record. If you see a primary care provider, you can also send messages to your care team and make appointments. If you have questions, please call your primary care clinic.  If you do not have a primary care provider, please call [phone] and they will assist you.        Care EveryWhere ID     This is your Care EveryWhere ID. This could be used by other organizations to access your [facility] medical records  [id number]        Your Vitals Were     BMI (Body Mass Index)                   33.36 kg/m2            Blood Pressure from Last 3 Encounters:   11/02/17 132/72   11/02/17 132/72   05/24/17 128/76    Weight from Last 3 Encounters:   11/02/17 232 lb (105.2 kg)   11/02/17 232 lb 8 oz (105.5 kg)   05/24/17 210 lb 12.8 oz (95.6 kg)                 Today's Medication Changes          These changes are accurate as of: 11/2/17 11:59 PM.  If you have any questions, ask your nurse or doctor.               Start taking these medicines.        Dose/Directions    clotrimazole 1 % cream   Commonly known as:  LOTRIMIN   Used for:  Candida rash of groin   Started by:  Lori Lynn MD        Apply topically 2 times daily   Quantity:  30 g   Refills:  0            Where  to get your medicines      [id number] [place], [place] [facility] [address] [facility] [address] [facility]  [address] [facility] [address]     Phone:  [phone]     clotrimazole 1 % cream                Primary Care Provider Office Phone # Fax #    Krista Juares [phone] [address] [facility] [address]        Equal Access to Services     LIZETH COOK : Hadii aad ku hadasho Soomaali, waaxda luqadaha, qaybta kaalmada adeegyada, waxay idiin hayaan adeeg buddyaidadarell holder . So [facility] [phone].    ATENCIÓN: Si habla español, tiene a gutierrez disposición servicios gratuitos de asistencia lingüística. Llame al [phone].    We comply with applicable federal civil rights laws and [place] laws. We do not discriminate on the basis of race, color, national origin, age, disability, sex, sexual orientation, or gender identity.            Thank you!     Thank you for choosing [facility]  for your care. Our goal is always to provide you with excellent care. Hearing back from our patients is one way we can continue to improve our services. Please take a few minutes to complete the written survey that you may receive in the mail after your visit with us. Thank you!             Your Updated Medication List - Protect others around you: Learn how to safely use, store and throw away your medicines at www.disposemymeds.org.          This list is accurate as of: 11/2/17 11:59 PM.  Always use your most recent med list.                   Brand Name Dispense Instructions for use Diagnosis    ASPIRIN NOT PRESCRIBED    INTENTIONAL     Antiplatelet medication not prescribed intentionally due to Not indicated based on age    Diabetes mellitus type 2, controlled (H)       BD VICENTE U/F 32G X 4 MM   Generic drug:  insulin pen needle     100 each    USE ONCE DAILY AS DIRECTED.    Controlled type 2 diabetes mellitus without complication, without long-term current use of insulin (H)        clotrimazole 1 % cream    LOTRIMIN    30 g    Apply topically 2 times daily    Candida rash of groin       fluticasone-salmeterol 250-50 MCG/DOSE diskus inhaler    ADVAIR DISKUS    3 Inhaler    Inhale 1 puff into the lungs 2 times daily    Asthma, persistent controlled       FREESTYLE LITE test strip   Generic drug:  blood glucose monitoring     200 each    Test twice daily    Diabetes mellitus type 2, controlled (H)       lisinopril 10 MG tablet    PRINIVIL/ZESTRIL    90 tablet    TAKE 1 TABLET BY MOUTH DAILY (EVERY 24 HOURS).    Benign essential hypertension       metFORMIN 500 MG 24 hr tablet    GLUCOPHAGE-XR    360 tablet    Take 2 tablets (1,000 mg) by mouth 2 times daily (with meals)    Type 2 diabetes mellitus with hyperglycemia, unspecified long term insulin use status (H)       MULTIVITAMIN & MINERAL PO           omega-3 acid ethyl esters 1 G capsule    Lovaza          PROBIOTIC ACIDOPHILUS PO           simvastatin 40 MG tablet    ZOCOR    90 tablet    TAKE 1 TABLET (40 MG) BY MOUTH AT BEDTIME    Mixed hyperlipidemia       VENTOLIN  (90 BASE) MCG/ACT Inhaler   Generic drug:  albuterol     1 Inhaler    INHALE 2 PUFFS BY MOUTH EVERY 4 HOURS AS NEEDED.    Asthma, persistent controlled       VICTOZA PEN 18 MG/3ML soln   Generic drug:  liraglutide     6 mL    INJECT 1.2 MG SUBCUTANEOUS DAILY **EACH PEN LASTS FOR 15 DAYS, THEREFORE 45 DAY SUPPLY**    Type 2 diabetes mellitus with hyperglycemia, unspecified long term insulin use status (H)

## 2017-11-02 NOTE — PATIENT INSTRUCTIONS
Recommendations from today's MTM visit:                                                      1. The fish oil and probiotic may not be providing a lot of benefit for the money you are paying. It's up to you to restart them if you want.     2. We'll check your A1c today- the 3 month average of your blood sugars. Faith or Kortney will MyChart you your results.     Next MTM visit: 6 months    To schedule another MTM appointment, please call the clinic directly or you may call the MTM scheduling line at [phone] or toll-free at [phone].     My Clinical Pharmacist's contact information:                                                      It was a pleasure seeing you today!  Please feel free to contact me with any questions or concerns you have.      Faith Montague, Pharm.D., M.B.A., [facility]  MTM Pharmacist, [facility]  Pager: [phone]  Email: [email or web address]     You may receive a survey about the MTM services you received.  I would appreciate your feedback to help me serve you better in the future. Please fill it out and return it when you can. Your comments will be anonymous.

## 2017-11-02 NOTE — MR AVS SNAPSHOT
After Visit Summary   11/2/2017    Jeanmarie Mckinley    MRN: [id number]           Patient Information     Date Of Birth          1975        Visit Information        Provider Department      11/2/2017 2:45 PM Lori Lynn MD [facility]        Today's Diagnoses     Candida rash of groin    -  1      Care Instructions      Fungal Skin Infection (Tinea)  A fungal infection occurs when too much fungus grows on or in the body. Fungus normally lives on the skin in small amounts and does not cause harm. But when too much grows on the skin, it causes an infection. This is also known as tinea. Fungal skin infections are common and not usually serious.  The infection often starts as a small red area the size of a pea. The skin may turn dry and flaky. The area may itch. As the fungus grows, it spreads out in a red [place]. Because of how it looks, fungal skin infection is often called ringworm, but it is not caused by a worm. Fungal skin infections can occur on many parts of the body. They can grow on the head, chest, arms, or legs. They can occur on the buttocks. On the feet, fungal infection is known as  athlete s foot.  It causes itchy, sometimes painful sores between the toes and the bottom or sides of the feet. In the groin, the rash is called  jock itch.   People with weak immune systems can get a fungal infection more easily. This includes people with diabetes or HIV, or who are being treated for cancer. In these cases, the fungal infection can spread and cause severe illness. Fungal infections are also more common in people who are overweight.  In most cases, treatment is done with antifungal cream or ointment. If the infection is on your scalp, you may take oral medicine. In some cases, a tiny piece of the skin (biopsy) may be taken. This is so it can be tested in a lab.  Common fungal infections are treated with creams on the skin or oral medicine.  Home care  Follow all  instructions when using antifungal cream or ointment on your skin. Your healthcare provider may advise using cornstarch powder to keep your skin dry or petroleum jelly to provide a barrier.  General care:    If you were prescribed an oral medicine, read the patient information. Talk with your healthcare provider about the risks and side effects.    Let your skin dry completely after bathing. Carefully dry your feet and between your toes.    Dress in loose cotton clothing.    Don t scratch the affected area. This can delay healing and may spread the infection. It can also cause a bacterial infection.    Keep your skin clean, but don t wash the skin too much. This can irritate your skin.    Keep in mind that it may take a week before the fungus starts to go away. It can take 2 to 4 weeks to fully clear. To prevent it from coming back, use the medicine until the rash is all gone.  Follow-up care  Follow up with your healthcare provider if the rash does not get better after 10 days of treatment. Also follow up if the rash spreads to other parts of your body.  When to seek medical advice  Call your healthcare provider right away if any of these occur:    Fever of 100.4 F (38 C) or higher    Redness or swelling that gets worse    Pain that gets worse    Foul-smelling fluid leaking from the skin  Date Last Reviewed: 11/1/2016 2000-2017 [employer]. [address] [place] [address], [place] [address]. All rights reserved. This information is not intended as a substitute for professional medical care. Always follow your healthcare professional's instructions.                Follow-ups after your visit        Follow-up notes from your care team     Return if symptoms worsen or fail to improve.      Future tests that were ordered for you today     Open Future Orders        Priority Expected Expires Ordered    Hemoglobin A1c Routine  11/2/2018 11/2/2017            Who to contact     If you have questions or need follow  "up information about today's clinic visit or your schedule please contact [facility] directly at [phone].  Normal or non-critical lab and imaging results will be communicated to you by Feedgenhart, letter or phone within 4 business days after the clinic has received the results. If you do not hear from us within 7 days, please contact the clinic through Feedgenhart or phone. If you have a critical or abnormal lab result, we will notify you by phone as soon as possible.  Submit refill requests through Medialive or call your pharmacy and they will forward the refill request to us. Please allow 3 business days for your refill to be completed.          Additional Information About Your Visit        [facility] Information     Medialive gives you secure access to your electronic health record. If you see a primary care provider, you can also send messages to your care team and make appointments. If you have questions, please call your primary care clinic.  If you do not have a primary care provider, please call [phone] and they will assist you.        Care EveryWhere ID     This is your Care EveryWhere ID. This could be used by other organizations to access your [place] medical records  [id number]        Your Vitals Were     Pulse Respirations Height Pulse Oximetry BMI (Body Mass Index)       90 14 5' 10\" (1.778 m) 100% 33.29 kg/m2        Blood Pressure from Last 3 Encounters:   11/02/17 132/72   11/02/17 132/72   05/24/17 128/76    Weight from Last 3 Encounters:   11/02/17 232 lb (105.2 kg)   11/02/17 232 lb 8 oz (105.5 kg)   05/24/17 210 lb 12.8 oz (95.6 kg)              Today, you had the following     No orders found for display         Today's Medication Changes          These changes are accurate as of: 11/2/17  3:23 PM.  If you have any questions, ask your nurse or doctor.               Start taking these medicines.        Dose/Directions    clotrimazole 1 % cream   Commonly known as:  LOTRIMIN   Used " for:  Candida rash of groin   Started by:  Lori Lynn MD        Apply topically 2 times daily   Quantity:  30 g   Refills:  0            Where to get your medicines      These medications were sent to [id number] - [place], [place] - [address] [address]     Phone:  [phone]     clotrimazole 1 % cream                Primary Care Provider Office Phone # Fax #    Krista Efrain Juares [phone] [address] [facility] [address]        Equal Access to Services     [facility]: Hadii aad ku hadasho Soomaali, waaxda luqadaha, qaybta kaalmada adejohnnyda, chanda holder . So [facility] [phone].    ATENCIÓN: Si habla español, tiene a gutierrez disposición servicios gratuitos de asistencia lingüística. Llame al [phone].    We comply with applicable federal civil rights laws and [place] laws. We do not discriminate on the basis of race, color, national origin, age, disability, sex, sexual orientation, or gender identity.            Thank you!     Thank you for choosing [facility]  for your care. Our goal is always to provide you with excellent care. Hearing back from our patients is one way we can continue to improve our services. Please take a few minutes to complete the written survey that you may receive in the mail after your visit with us. Thank you!             Your Updated Medication List - Protect others around you: Learn how to safely use, store and throw away your medicines at www.disposemymeds.org.          This list is accurate as of: 11/2/17  3:23 PM.  Always use your most recent med list.                   Brand Name Dispense Instructions for use Diagnosis    ASPIRIN NOT PRESCRIBED    INTENTIONAL     Antiplatelet medication not prescribed intentionally due to Not indicated based on age    Diabetes mellitus type 2, controlled (H)       BD VICENTE U/F 32G X 4 MM   Generic drug:  insulin pen needle      100 each    USE ONCE DAILY AS DIRECTED.    Controlled type 2 diabetes mellitus without complication, without long-term current use of insulin (H)       clotrimazole 1 % cream    LOTRIMIN    30 g    Apply topically 2 times daily    Candida rash of groin       fluticasone-salmeterol 250-50 MCG/DOSE diskus inhaler    ADVAIR DISKUS    3 Inhaler    Inhale 1 puff into the lungs 2 times daily    Asthma, persistent controlled       FREESTYLE LITE test strip   Generic drug:  blood glucose monitoring     200 each    Test twice daily    Diabetes mellitus type 2, controlled (H)       lisinopril 10 MG tablet    PRINIVIL/ZESTRIL    90 tablet    TAKE 1 TABLET BY MOUTH DAILY (EVERY 24 HOURS).    Benign essential hypertension       metFORMIN 500 MG 24 hr tablet    GLUCOPHAGE-XR    360 tablet    Take 2 tablets (1,000 mg) by mouth 2 times daily (with meals)    Type 2 diabetes mellitus with hyperglycemia, unspecified long term insulin use status (H)       MULTIVITAMIN & MINERAL PO           omega-3 acid ethyl esters 1 G capsule    Lovaza          PROBIOTIC ACIDOPHILUS PO           simvastatin 40 MG tablet    ZOCOR    90 tablet    TAKE 1 TABLET (40 MG) BY MOUTH AT BEDTIME    Mixed hyperlipidemia       VENTOLIN  (90 BASE) MCG/ACT Inhaler   Generic drug:  albuterol     1 Inhaler    INHALE 2 PUFFS BY MOUTH EVERY 4 HOURS AS NEEDED.    Asthma, persistent controlled       VICTOZA PEN 18 MG/3ML soln   Generic drug:  liraglutide     6 mL    INJECT 1.2 MG SUBCUTANEOUS DAILY **EACH PEN LASTS FOR 15 DAYS, THEREFORE 45 DAY SUPPLY**    Type 2 diabetes mellitus with hyperglycemia, unspecified long term insulin use status (H)

## 2017-11-02 NOTE — PROGRESS NOTES
SUBJECTIVE:   Jeanmarie Mckinley is a 42 year old male who presents to clinic today for cooper-anal rash. The patient reports that symptoms have been present for the past 3-5 weeks. Reports itching only. Denies any pain. Denies any alliviating or exacerbating factors. Has not tried anything for it. He is sexually active, denies any concerns about STIs.   Rash      Duration: 3-5 weeks    Description  Location: under scrotum-cooper-anal area  Itching: mild    Intensity:  mild    Accompanying signs and symptoms: possible increase in BM frequency    History (similar episodes/previous evaluation): None    Precipitating or alleviating factors:  New exposures:  None and soaps possibly  Recent travel: no      Therapies tried and outcome: none        PROBLEMS TO ADD ON...    Problem list and histories reviewed & adjusted, as indicated.  Additional history: none    Current Outpatient Prescriptions   Medication Sig Dispense Refill     clotrimazole (LOTRIMIN) 1 % cream Apply topically 2 times daily 30 g 0     BD VICENTE U/F 32G X 4 MM insulin pen needle USE ONCE DAILY AS DIRECTED. 100 each 0     VICTOZA PEN 18 MG/3ML soln INJECT 1.2 MG SUBCUTANEOUS DAILY **EACH PEN LASTS FOR 15 DAYS, THEREFORE 45 DAY SUPPLY** 6 mL 0     lisinopril (PRINIVIL/ZESTRIL) 10 MG tablet TAKE 1 TABLET BY MOUTH DAILY (EVERY 24 HOURS). 90 tablet 2     simvastatin (ZOCOR) 40 MG tablet TAKE 1 TABLET (40 MG) BY MOUTH AT BEDTIME 90 tablet 1     VENTOLIN  (90 BASE) MCG/ACT Inhaler INHALE 2 PUFFS BY MOUTH EVERY 4 HOURS AS NEEDED. 1 Inhaler 2     omega-3 acid ethyl esters (LOVAZA) 1 G capsule        Multiple Vitamins-Minerals (MULTIVITAMIN & MINERAL PO)        Lactobacillus (PROBIOTIC ACIDOPHILUS PO)        fluticasone-salmeterol (ADVAIR DISKUS) 250-50 MCG/DOSE diskus inhaler Inhale 1 puff into the lungs 2 times daily 3 Inhaler 1     metFORMIN (GLUCOPHAGE-XR) 500 MG 24 hr tablet Take 2 tablets (1,000 mg) by mouth 2 times daily (with meals) 360 tablet 1      "FREESTYLE LITE test strip Test twice daily 200 each 0     ASPIRIN NOT PRESCRIBED (INTENTIONAL) Antiplatelet medication not prescribed intentionally due to Not indicated based on age  0     [DISCONTINUED] fluticasone-salmeterol (ADVAIR) 500-50 MCG/DOSE diskus inhaler Inhale 1 puff into the lungs 2 times daily 1 Inhaler 1     Recent Labs   Lab Test  05/24/17   1035  12/26/16   0758  09/22/16   1050  04/05/16   1403   A1C  5.2  5.9  8.1*  6.9*   LDL  65  86   --   92   HDL  54  36*   --    --    TRIG  72  258*   --    --    CR  1.05  1.07  0.98  0.90   GFRESTIMATED  78  76  84  >90  Non  GFR Calc     GFRESTBLACK  >90   GFR Calc    >90   GFR Calc    >90   GFR Calc    >90   GFR Calc     POTASSIUM  4.1  4.3  4.0  4.2   TSH   --    --    --   0.34*          Reviewed and updated as needed this visit by clinical staffTobacco  Allergies  Meds  Med Hx  Surg Hx  Fam Hx  Soc Hx      Reviewed and updated as needed this visit by Provider         ROS:  Constitutional, HEENT, cardiovascular, pulmonary, gi and gu systems are negative, except as otherwise noted.      OBJECTIVE:   /72  Pulse 90  Resp 14  Ht 5' 10\" (1.778 m)  Wt 232 lb (105.2 kg)  SpO2 100%  BMI 33.29 kg/m2  Body mass index is 33.29 kg/(m^2).  GENERAL: healthy, alert and no distress  RECTAL (male): The patient had erythema and white plaque around the external anal sphincter.  MS: no gross musculoskeletal defects noted, no edema  NEURO: Normal strength and tone, mentation intact and speech normal  PSYCH: mentation appears normal, affect normal/bright    Diagnostic Test Results:  none     ASSESSMENT/PLAN:   1. Candida rash of groin  Patient rash/itching is consistent with candida rash of groin.  Plan  - The patient was advised to keep the area clean and dry.   - clotrimazole (LOTRIMIN) 1 % cream; Apply topically 2 times daily  Dispense: 30 g; Refill: 0    Lori Lynn, " MD  [facility]

## 2017-11-02 NOTE — PROGRESS NOTES
SUBJECTIVE/OBJECTIVE:                Jeanmarie Mckinley is a 42 year old male coming in for a follow-up visit for Medication Therapy Management.  He was referred to me from Dr. Juares.     Chief Complaint: Follow up from our visit on 3/23.  F/u on asthma and DM    Tobacco: No tobacco use  Alcohol: Less than 1 beverages / week    Medication Adherence: no issues reported    Diabetes:  Pt currently taking metformin 2,000 XR daily, Victoza 1.2mg daily. Pt is not experiencing side effects.  SMBG: 3 times once per week. Ranges (patient reported): 2h post-prandial 130, before eating 100.   Symptoms of low blood sugar? none. Frequency of hypoglycemia? never.  Recent symptoms of high blood sugar? none  Eye exam: Due - it's on his radar that he needs to do that.   Foot exam: due  Microalbumin is not < 30 mg/g. Pt is taking an ACEi/ARB.  Aspirin: Not taking due to patient age less than 50  Diet/Exercise: his weight is up and he feels like he needs to get back on track with diet and exercise a bit more.   Flu shot done at "[facility]" on 10/29/17    Allergies: Hasn't refilled allergy meds - flonase, singulair, lortadine. They were started (per his report) due to a cough that persisted for about 3 months. Cough has now resolved and he stopped these medications.     Asthma:  Current asthma medications: Fluticasone+Salmeterol (Advair) 250-50 mcg/dose BID (was taking 500/50 for one month, then decreased back down to 250/50). Also has Ventolin PRN - patient estimates use 1-2 times in last couple months.   Pt rinses their mouth after using steroid inhaler. Asthma triggers include: upper respiratory infections and exercise or sports. He has noticed that it worsens either in the fall or spring - it's always one or the other.   Pt reports the following symptoms: none  AAP on file: YES    ACT Total Scores 3/23/2017 5/24/2017 11/2/2017   ACT TOTAL SCORE (Goal Greater than or Equal to 20) 24 9 23   In the past 12 months, how many times did you  visit the emergency room for your asthma without being admitted to the hospital? 0 0 0   In the past 12 months, how many times were you hospitalized overnight because of your asthma? 0 0 0     Hypertension: Current medications include lisinopril 10 mg daily.  Patient does not self-monitor BP.  Patient reports no current medication side effects.    Hyperlipidemia: Current therapy includes simvastatin 40 mg once daily.  Pt reports no significant myalgias or other side effects.     Supplements: Hasn't consistently been taking MVI daily, proboitic daily, Omega 3 fatty acids (unknown dose) - but would like to get back on track with these. Patient reports no side effects.    Prostate problems: He states he has an itch on his prostate. He also notes he goes to the bathroom every 2-3 hours during the day and wakes up once per night. Has met with urologist a couple years ago - had a full work-up for this, but it was at .     Current labs include:  BP Readings from Last 3 Encounters:   05/24/17 128/76   03/23/17 118/66   10/12/16 116/69     Today's Vitals: /72  Wt 232 lb 8 oz (105.5 kg)  BMI 33.36 kg/m2  Lab Results   Component Value Date    A1C 5.2 05/24/2017   .  Lab Results   Component Value Date    CHOL 133 05/24/2017     Lab Results   Component Value Date    TRIG 72 05/24/2017     Lab Results   Component Value Date    HDL 54 05/24/2017     Lab Results   Component Value Date    LDL 65 05/24/2017       Lab Results   Component Value Date    UCRR 96 03/23/2017    MICROL 13 03/23/2017    UMALCR 13.30 03/23/2017       Last Basic Metabolic Panel:  Lab Results   Component Value Date     05/24/2017      Lab Results   Component Value Date    POTASSIUM 4.1 05/24/2017     Lab Results   Component Value Date    CHLORIDE 105 05/24/2017     Lab Results   Component Value Date    BUN 16 05/24/2017     Lab Results   Component Value Date    CR 1.05 05/24/2017       GFR Estimate If Black   Date Value Ref Range Status    05/24/2017 >90   GFR Calc   >60 mL/min/1.7m2 Final   12/26/2016 >90   GFR Calc   >60 mL/min/1.7m2 Final   09/22/2016 >90   GFR Calc   >60 mL/min/1.7m2 Final   ]    Most Recent Immunizations   Administered Date(s) Administered     Influenza (IIV3) 10/12/2016     Pneumococcal (PCV 13) 10/12/2016     Pneumococcal 23 valent 11/30/2009     TD (ADULT, 7+) 04/05/2016       ASSESSMENT:              Current medications were reviewed today as discussed above.      Medication Adherence: no issues identified    Diabetes: Due for A1c today.     Allergies: Stable - he's not having sx so no need to restart meds at this time. May consider starting them as spring approaches to avoid problems.     Asthma:  ACT improved. No changes today.     Hypertension: Stable    Hyperlipidemia: Stable    Supplements: Likely not getting a lot of benefit from daily Omega3 and Probiotic. Could discontinue if he wishes.     Prostate problems: Should see a provider.      PLAN:                  1. A1c today - will mychart with results  2. Ok to stop Omegas and probiotics if desired  3. Pt to see Dr. Lynn for evaluation of his prostate concerns.     I spent 30 minutes with this patient today. All changes were made via collaborative practice agreement with Krista Juares. A copy of the visit note was provided to the patient's primary care provider.     Will follow up in 6 months, sooner if A1c not at goal.    The patient was sent via [employer] a summary of these recommendations as an after visit summary.    Kortney Daigle, PharmD  Medication Therapy Management Resident  Faith Montague, PharmD, VITO, BCACP   Medication Therapy Management Pharmacist

## 2017-11-02 NOTE — PATIENT INSTRUCTIONS
Fungal Skin Infection (Tinea)  A fungal infection occurs when too much fungus grows on or in the body. Fungus normally lives on the skin in small amounts and does not cause harm. But when too much grows on the skin, it causes an infection. This is also known as tinea. Fungal skin infections are common and not usually serious.  The infection often starts as a small red area the size of a pea. The skin may turn dry and flaky. The area may itch. As the fungus grows, it spreads out in a red [place]. Because of how it looks, fungal skin infection is often called ringworm, but it is not caused by a worm. Fungal skin infections can occur on many parts of the body. They can grow on the head, chest, arms, or legs. They can occur on the buttocks. On the feet, fungal infection is known as  athlete s foot.  It causes itchy, sometimes painful sores between the toes and the bottom or sides of the feet. In the groin, the rash is called  jock itch.   People with weak immune systems can get a fungal infection more easily. This includes people with diabetes or HIV, or who are being treated for cancer. In these cases, the fungal infection can spread and cause severe illness. Fungal infections are also more common in people who are overweight.  In most cases, treatment is done with antifungal cream or ointment. If the infection is on your scalp, you may take oral medicine. In some cases, a tiny piece of the skin (biopsy) may be taken. This is so it can be tested in a lab.  Common fungal infections are treated with creams on the skin or oral medicine.  Home care  Follow all instructions when using antifungal cream or ointment on your skin. Your healthcare provider may advise using cornstarch powder to keep your skin dry or petroleum jelly to provide a barrier.  General care:    If you were prescribed an oral medicine, read the patient information. Talk with your healthcare provider about the risks and side effects.    Let your skin dry  completely after bathing. Carefully dry your feet and between your toes.    Dress in loose cotton clothing.    Don t scratch the affected area. This can delay healing and may spread the infection. It can also cause a bacterial infection.    Keep your skin clean, but don t wash the skin too much. This can irritate your skin.    Keep in mind that it may take a week before the fungus starts to go away. It can take 2 to 4 weeks to fully clear. To prevent it from coming back, use the medicine until the rash is all gone.  Follow-up care  Follow up with your healthcare provider if the rash does not get better after 10 days of treatment. Also follow up if the rash spreads to other parts of your body.  When to seek medical advice  Call your healthcare provider right away if any of these occur:    Fever of 100.4 F (38 C) or higher    Redness or swelling that gets worse    Pain that gets worse    Foul-smelling fluid leaking from the skin  Date Last Reviewed: 11/1/2016 2000-2017 [employer]. [address] [address], [place] [address]. All rights reserved. This information is not intended as a substitute for professional medical care. Always follow your healthcare professional's instructions.

## 2017-11-03 ASSESSMENT — ASTHMA QUESTIONNAIRES: ACT_TOTALSCORE: 23

## 2017-11-07 ENCOUNTER — TELEPHONE (OUTPATIENT)
Dept: PHARMACY | Facility: CLINIC | Age: 42
End: 2017-11-07

## 2017-11-08 DIAGNOSIS — E11.65 TYPE 2 DIABETES MELLITUS WITH HYPERGLYCEMIA, UNSPECIFIED LONG TERM INSULIN USE STATUS: ICD-10-CM

## 2017-11-08 RX ORDER — METFORMIN HCL 500 MG
TABLET, EXTENDED RELEASE 24 HR ORAL
Qty: 360 TABLET | Refills: 1 | Status: SHIPPED | OUTPATIENT
Start: 2017-11-08 | End: 2018-05-13

## 2017-11-09 ENCOUNTER — MYC MEDICAL ADVICE (OUTPATIENT)
Dept: PHARMACY | Facility: CLINIC | Age: 42
End: 2017-11-09

## 2017-11-09 DIAGNOSIS — E11.65 TYPE 2 DIABETES MELLITUS WITH HYPERGLYCEMIA, UNSPECIFIED LONG TERM INSULIN USE STATUS: ICD-10-CM

## 2017-11-09 RX ORDER — LIRAGLUTIDE 6 MG/ML
INJECTION SUBCUTANEOUS
Qty: 6 ML | Refills: 0 | Status: CANCELLED | OUTPATIENT
Start: 2017-11-09

## 2017-11-10 RX ORDER — LIRAGLUTIDE 6 MG/ML
0.6 INJECTION SUBCUTANEOUS DAILY
Qty: 6 ML | Refills: 1 | Status: SHIPPED | OUTPATIENT
Start: 2017-11-10 | End: 2018-05-24

## 2017-11-10 NOTE — TELEPHONE ENCOUNTER
Excellent- yes please decrease victoza to 0.6 mg once daily and recheck A!C in 3 months.  Should see provider at the same time. Thank you for all the help  Krista Juares ....................  11/10/2017   8:40 AM

## 2017-11-10 NOTE — TELEPHONE ENCOUNTER
Decreasing Victoza to 0.6mg once daily per verbal approval from Dr. Juares. Please see MyChart encounter from 11/9.     Kortney Daigle, PharmD  Pharmaceutical Care Resident  Pager: [phone]

## 2017-12-20 ENCOUNTER — OFFICE VISIT (OUTPATIENT)
Dept: OPHTHALMOLOGY | Facility: CLINIC | Age: 42
End: 2017-12-20
Attending: OPHTHALMOLOGY
Payer: COMMERCIAL

## 2017-12-20 DIAGNOSIS — H40.003 GLAUCOMA SUSPECT OF BOTH EYES: ICD-10-CM

## 2017-12-20 DIAGNOSIS — E11.9 TYPE 2 DIABETES MELLITUS WITHOUT COMPLICATION, WITHOUT LONG-TERM CURRENT USE OF INSULIN (H): Primary | ICD-10-CM

## 2017-12-20 PROCEDURE — 99213 OFFICE O/P EST LOW 20 MIN: CPT | Mod: ZF

## 2017-12-20 ASSESSMENT — TONOMETRY
IOP_METHOD: TONOPEN
OS_IOP_MMHG: 14
OD_IOP_MMHG: 15

## 2017-12-20 ASSESSMENT — EXTERNAL EXAM - RIGHT EYE: OD_EXAM: NORMAL

## 2017-12-20 ASSESSMENT — VISUAL ACUITY
METHOD: SNELLEN - LINEAR
OD_SC+: -1
OS_SC: 20/15
OS_SC+: -1
OD_SC: 20/20

## 2017-12-20 ASSESSMENT — CUP TO DISC RATIO
OS_RATIO: 0.7
OD_RATIO: 0.75

## 2017-12-20 ASSESSMENT — SLIT LAMP EXAM - LIDS
COMMENTS: NORMAL
COMMENTS: NORMAL

## 2017-12-20 ASSESSMENT — REFRACTION_MANIFEST
OS_SPHERE: +0.00
OD_SPHERE: -0.25

## 2017-12-20 ASSESSMENT — CONF VISUAL FIELD
METHOD: COUNTING FINGERS
OD_NORMAL: 1
OS_NORMAL: 1

## 2017-12-20 ASSESSMENT — EXTERNAL EXAM - LEFT EYE: OS_EXAM: NORMAL

## 2017-12-20 NOTE — MR AVS SNAPSHOT
After Visit Summary   12/20/2017    Jeanmarie Mckinley    MRN: [id number]           Patient Information     Date Of Birth          1975        Visit Information        Provider Department      12/20/2017 2:00 PM [facility]        Today's Diagnoses     Type 2 diabetes mellitus without complication, without long-term current use of insulin (H)    -  1    Glaucoma suspect of both eyes           Follow-ups after your visit        Follow-up notes from your care team     Return in about 1 year (around 12/20/2018) for OCT RNFL.      Future tests that were ordered for you today     Open Future Orders        Priority Expected Expires Ordered    OCT Optic Nerve RNFL Spectralis OU (both eyes) Routine  6/23/2019 12/20/2017            Who to contact     Please call your clinic at [phone] to:    Ask questions about your health    Make or cancel appointments    Discuss your medicines    Learn about your test results    Speak to your doctor   If you have compliments or concerns about an experience at your clinic, or if you wish to file a complaint, please contact [facility] Patient Relations at [phone] or email us at [email or web address]         Additional Information About Your Visit        MyChart Information     [employer]Met gives you secure access to your electronic health record. If you see a primary care provider, you can also send messages to your care team and make appointments. If you have questions, please call your primary care clinic.  If you do not have a primary care provider, please call [phone] and they will assist you.      ReferMet is an electronic gateway that provides easy, online access to your medical records. With [employer], you can request a clinic appointment, read your test results, renew a prescription or communicate with your care team.     To access your existing account, please contact your [facility]  Clinic or call [phone] for assistance.        Care EveryWhere ID     This is your Care EveryWhere ID. This could be used by other organizations to access your [facility] [place] medical records  [id number]         Blood Pressure from Last 3 Encounters:   11/02/17 132/72   11/02/17 132/72   05/24/17 128/76    Weight from Last 3 Encounters:   11/02/17 105.2 kg (232 lb)   11/02/17 105.5 kg (232 lb 8 oz)   05/24/17 95.6 kg (210 lb 12.8 oz)              We Performed the Following     OVF 24-2 Dynamic OU        Primary Care Provider Office Phone # Fax #    Krista Juares [phone] [address] [facility] [address]        Equal Access to Services     [facility]ZETH [facility] : Hadii flex oleary hadasho Soomaali, waaxda luqadaha, qaybta kaalmada sundar, chanda holder . [facility] [phone].    ATENCIÓN: Si habla español, tiene a gutierrez disposición servicios gratuitos de asistencia lingüística. [facility] [phone].    We comply with applicable federal civil rights laws and [place] laws. We do not discriminate on the basis of race, color, national origin, age, disability, sex, sexual orientation, or gender identity.            Thank you!     Thank you for choosing [facility]  for your care. Our goal is always to provide you with excellent care. Hearing back from our patients is one way we can continue to improve our services. Please take a few minutes to complete the written survey that you may receive in the mail after your visit with us. Thank you!             Your Updated Medication List - Protect others around you: Learn how to safely use, store and throw away your medicines at www.disposemymeds.org.          This list is accurate as of: 12/20/17 11:59 PM.  Always use your most recent med list.                   Brand Name Dispense Instructions for use Diagnosis    ASPIRIN NOT PRESCRIBED    INTENTIONAL     Antiplatelet medication not prescribed intentionally due to Not  indicated based on age    Diabetes mellitus type 2, controlled (H)       BD VICENTE U/F 32G X 4 MM   Generic drug:  insulin pen needle     100 each    USE ONCE DAILY AS DIRECTED.    Controlled type 2 diabetes mellitus without complication, without long-term current use of insulin (H)       clotrimazole 1 % cream    LOTRIMIN    30 g    Apply topically 2 times daily    Candida rash of groin       fluticasone-salmeterol 250-50 MCG/DOSE diskus inhaler    ADVAIR DISKUS    3 Inhaler    Inhale 1 puff into the lungs 2 times daily    Asthma, persistent controlled       FREESTYLE LITE test strip   Generic drug:  blood glucose monitoring     200 each    Test twice daily    Diabetes mellitus type 2, controlled (H)       liraglutide 18 MG/3ML soln    VICTOZA PEN    6 mL    Inject 0.6 mg Subcutaneous daily    Type 2 diabetes mellitus with hyperglycemia, unspecified long term insulin use status (H)       lisinopril 10 MG tablet    PRINIVIL/ZESTRIL    90 tablet    TAKE 1 TABLET BY MOUTH DAILY (EVERY 24 HOURS).    Benign essential hypertension       metFORMIN 500 MG 24 hr tablet    GLUCOPHAGE-XR    360 tablet    TAKE 2 TABLETS (1,000 MG) BY MOUTH 2 TIMES DAILY (WITH MEALS)    Type 2 diabetes mellitus with hyperglycemia, unspecified long term insulin use status (H)       MULTIVITAMIN & MINERAL PO           omega-3 acid ethyl esters 1 G capsule    Lovaza          PROBIOTIC ACIDOPHILUS PO           simvastatin 40 MG tablet    ZOCOR    90 tablet    TAKE 1 TABLET (40 MG) BY MOUTH AT BEDTIME    Mixed hyperlipidemia       VENTOLIN  (90 BASE) MCG/ACT Inhaler   Generic drug:  albuterol     1 Inhaler    INHALE 2 PUFFS BY MOUTH EVERY 4 HOURS AS NEEDED.    Asthma, persistent controlled

## 2017-12-20 NOTE — PROGRESS NOTES
I have confirmed the patient's and reviewed Past Medical History, Past Surgical History, Social History, Family History, Problem List, Medication List and agree with Tech note.    CC: diabetic eye exam.     HPI: Jeanmarie Mckinley is a 42 year old male here for glaucoma followup and visual field along with diabetic eye exam. Last eye exam 6 months year ago. Never started on any drops. Family hx with grand uncle who went blind, likely from severe diabetic complications. hgA1c 5.1. On Victoza / Metformin, no insulin.    Past medical hx: DMII x 8 years     Past ocular hx: glaucoma suspect    Ocular gtts: none     ASSESSMENT/PLAN:   1) DMII w/o evidence of DR   - Excellent glucose control    2) Glaucoma suspect based on C/D   - RNFL obtained 2016 normal, large nerve.    - pachy: 565/556   - fam hx: uncle and possible grandparents with glaucoma   - visual field 12/20/17 normal right eye, high false positive with nonspecific defect left eye       Coy Clarke MD  PGY3     Discussed with Dr Torres.    Teaching statement:  Complete documentation of historical and exam elements from today's encounter can be found in the full encounter summary report (not reduplicated in this progress note). I personally obtained the chief complaint(s) and history of present illness.  I confirmed and edited as necessary the review of systems, past medical/surgical history, family history, social history, and examination findings as documented by others; and I examined the patient myself. I personally reviewed the relevant tests, images, and reports as documented above.     I formulated and edited as necessary the assessment and plan and discussed the findings and management plan with the patient and family.    Pratima Torres MD  Comprehensive Ophthalmology & Ocular Pathology  Department of Ophthalmology and Visual Neurosciences  [email or web address]  Pager [phone]

## 2017-12-20 NOTE — NURSING NOTE
Chief Complaints and History of Present Illnesses   Patient presents with     Follow Up For     Type 2 diabetes mellitus without complication (H)     HPI    Last Eye Exam:  9/26/16   Affected eye(s):  Both   Symptoms:        Duration:  8 years   Frequency:  Constant       Do you have eye pain now?:  No      Comments:  Jeanmarie is here today for a diabetic eye exam. He says his vision has been stable over the past year and has no ocular concerns   HX of glaucoma suspect .  Denies flashes or floaters.     Frandy Flanagan COT 2:06 PM December 20, 2017

## 2018-01-17 DIAGNOSIS — E78.2 MIXED HYPERLIPIDEMIA: ICD-10-CM

## 2018-01-18 RX ORDER — SIMVASTATIN 40 MG
TABLET ORAL
Qty: 90 TABLET | Refills: 1 | Status: SHIPPED | OUTPATIENT
Start: 2018-01-18 | End: 2018-06-25

## 2018-01-18 NOTE — TELEPHONE ENCOUNTER
"Requested Prescriptions   Signed Prescriptions Disp Refills     simvastatin (ZOCOR) 40 MG tablet 90 tablet 1     Sig: TAKE 1 TABLET (40 MG) BY MOUTH AT BEDTIME    Statins Protocol Passed    1/17/2018  1:52 AM       Passed - LDL on file in past 12 months    Recent Labs   Lab Test  05/24/17   1035   LDL  65            Passed - No abnormal creatine kinase in past 12 months    No lab results found.         Passed - Recent or future visit with authorizing provider    Patient had office visit in the last year or has a visit in the next 30 days with authorizing provider.  See \"Patient Info\" tab in inbasket, or \"Choose Columns\" in Meds & Orders section of the refill encounter.            Passed - Patient is age 18 or older      Prescription approved per [facility] Refill Protocol.  "

## 2018-03-30 DIAGNOSIS — J45.998 ASTHMA, PERSISTENT CONTROLLED: ICD-10-CM

## 2018-04-02 NOTE — TELEPHONE ENCOUNTER
"Denied  Refill request too early; Rx sent 2/28/2018 for 6 inhalers total  Fatemeh OSORIO RN    Requested Prescriptions   Pending Prescriptions Disp Refills     fluticasone-salmeterol (ADVAIR DISKUS) 250-50 MCG/DOSE diskus inhaler 3 Inhaler 1     Sig: Inhale 1 puff into the lungs 2 times daily    Inhaled Steroids Protocol Passed    3/30/2018 11:48 AM       Passed - Patient is age 12 or older       Passed - Asthma control assessment score within normal limits in last 6 months    Please review ACT score.          Passed - Recent (6 mo) or future (30 days) visit within the authorizing provider's specialty    Patient had office visit in the last 6 months or has a visit in the next 30 days with authorizing provider or within the authorizing provider's specialty.  See \"Patient Info\" tab in inbasket, or \"Choose Columns\" in Meds & Orders section of the refill encounter.              "

## 2018-05-13 DIAGNOSIS — E11.65 TYPE 2 DIABETES MELLITUS WITH HYPERGLYCEMIA, UNSPECIFIED LONG TERM INSULIN USE STATUS: ICD-10-CM

## 2018-05-14 RX ORDER — METFORMIN HCL 500 MG
TABLET, EXTENDED RELEASE 24 HR ORAL
Qty: 120 TABLET | Refills: 0 | Status: SHIPPED | OUTPATIENT
Start: 2018-05-14 | End: 2018-06-17

## 2018-05-14 NOTE — TELEPHONE ENCOUNTER
"Medication is being filled for 1 time refill only due to:  Patient needs to be seen because due for diabetes f/u and labs.   Fatemeh OSORIO RN    Requested Prescriptions   Pending Prescriptions Disp Refills     metFORMIN (GLUCOPHAGE-XR) 500 MG 24 hr tablet [Pharmacy Med Name: METFORMIN  MG TABLET] 360 tablet 1     Sig: TAKE 2 TABLETS (1,000 MG) BY MOUTH 2 TIMES DAILY (WITH MEALS)    Biguanide Agents Failed    5/13/2018  9:51 AM       Failed - Blood pressure less than 140/90 in past 6 months    BP Readings from Last 3 Encounters:   11/02/17 132/72   11/02/17 132/72   05/24/17 128/76                Failed - Patient has had a Microalbumin in the past 12 mos.    Recent Labs   Lab Test  03/23/17   1629   MICROL  13   UMALCR  13.30            Failed - Patient has documented A1c within the specified period of time.    If HgbA1C is 8 or greater, it needs to be on file within the past 3 months.  If less than 8, must be on file within the past 6 months.     Recent Labs   Lab Test  11/02/17   1557   A1C  5.1            Failed - Recent (6 mo) or future (30 days) visit within the authorizing provider's specialty    Patient had office visit in the last 6 months or has a visit in the next 30 days with authorizing provider or within the authorizing provider's specialty.  See \"Patient Info\" tab in inbasket, or \"Choose Columns\" in Meds & Orders section of the refill encounter.           Passed - Patient has documented LDL within the past 12 mos.    Recent Labs   Lab Test  05/24/17   1035   LDL  65            Passed - Patient is age 10 or older       Passed - Patient's CR is NOT>1.4 OR Patient's EGFR is NOT<45 within past 12 mos.    Recent Labs   Lab Test  05/24/17   1035   GFRESTIMATED  78   GFRESTBLACK  >90   GFR Calc         Recent Labs   Lab Test  05/24/17   1035   CR  1.05            Passed - Patient does NOT have a diagnosis of CHF.              "

## 2018-05-24 DIAGNOSIS — E11.65 TYPE 2 DIABETES MELLITUS WITH HYPERGLYCEMIA, UNSPECIFIED LONG TERM INSULIN USE STATUS: ICD-10-CM

## 2018-05-24 RX ORDER — LIRAGLUTIDE 6 MG/ML
INJECTION SUBCUTANEOUS
Qty: 6 ML | Refills: 0 | Status: SHIPPED | OUTPATIENT
Start: 2018-05-24 | End: 2018-06-25

## 2018-05-24 NOTE — TELEPHONE ENCOUNTER
"Medication is being filled for 1 time refill only due to:  Patient needs to be seen because due for 6 month diabetes f/u.   Last seen 11/9/17  Alison Wei RN  Requested Prescriptions   Signed Prescriptions Disp Refills     VICTOZA PEN 18 MG/3ML soln 6 mL 0     Sig: INJECT 0.6 MG SUBCUTANEOUS DAILY    GLP-1 Agonists Protocol Failed    5/24/2018  1:18 AM       Failed - Blood pressure less than 140/90 in past 6 months    BP Readings from Last 3 Encounters:   11/02/17 132/72   11/02/17 132/72   05/24/17 128/76                Failed - Microalbumin on file in past 12 months    Recent Labs   Lab Test  03/23/17   1629   MICROL  13   UMALCR  13.30            Failed - HgbA1C in past 3 or 6 months    If HgbA1C is 8 or greater, it needs to be on file within the past 3 months.  If less than 8, must be on file within the past 6 months.     Recent Labs   Lab Test  11/02/17   1557   A1C  5.1            Failed - Recent (6 mo) or future (30 days) visit within the authorizing provider's specialty    Patient had office visit in the last 6 months or has a visit in the next 30 days with authorizing provider.  See \"Patient Info\" tab in inbasket, or \"Choose Columns\" in Meds & Orders section of the refill encounter.           Passed - LDL on file in past 12 months    Recent Labs   Lab Test  05/24/17   1035   LDL  65            Passed - Patient is age 18 or older       Passed - Normal serum creatinine on file in past 12 months    Recent Labs   Lab Test  05/24/17   1035   CR  1.05                 "

## 2018-06-17 DIAGNOSIS — E11.65 TYPE 2 DIABETES MELLITUS WITH HYPERGLYCEMIA, UNSPECIFIED LONG TERM INSULIN USE STATUS: ICD-10-CM

## 2018-06-18 RX ORDER — METFORMIN HCL 500 MG
TABLET, EXTENDED RELEASE 24 HR ORAL
Qty: 120 TABLET | Refills: 0 | Status: SHIPPED | OUTPATIENT
Start: 2018-06-18 | End: 2018-06-25

## 2018-06-18 NOTE — TELEPHONE ENCOUNTER
"Given 1 month supply 5/14/18  Due for 6 month diabetes follow up.  Future OV 6/25.  Will send in another 1 month supply.  Alison Wei RN  Requested Prescriptions   Pending Prescriptions Disp Refills     metFORMIN (GLUCOPHAGE-XR) 500 MG 24 hr tablet [Pharmacy Med Name: METFORMIN  MG TABLET] 120 tablet 0     Sig: TAKE 2 TABLETS (1,000 MG) BY MOUTH 2 TIMES DAILY (WITH MEALS)    Biguanide Agents Failed    6/17/2018  1:58 AM       Failed - Blood pressure less than 140/90 in past 6 months    BP Readings from Last 3 Encounters:   11/02/17 132/72   11/02/17 132/72   05/24/17 128/76                Failed - Patient has documented LDL within the past 12 mos.    Recent Labs   Lab Test  05/24/17   1035   LDL  65            Failed - Patient has had a Microalbumin in the past 12 mos.    Recent Labs   Lab Test  03/23/17   1629   MICROL  13   UMALCR  13.30            Failed - Patient has documented A1c within the specified period of time.    If HgbA1C is 8 or greater, it needs to be on file within the past 3 months.  If less than 8, must be on file within the past 6 months.     Recent Labs   Lab Test  11/02/17   1557   A1C  5.1            Passed - Patient is age 10 or older       Passed - Patient's CR is NOT>1.4 OR Patient's EGFR is NOT<45 within past 12 mos.    Recent Labs   Lab Test  05/24/17   1035   GFRESTIMATED  78   GFRESTBLACK  >90   GFR Calc         Recent Labs   Lab Test  05/24/17   1035   CR  1.05            Passed - Patient does NOT have a diagnosis of CHF.       Passed - Recent (6 mo) or future (30 days) visit within the authorizing provider's specialty    Patient had office visit in the last 6 months or has a visit in the next 30 days with authorizing provider or within the authorizing provider's specialty.  See \"Patient Info\" tab in inbasket, or \"Choose Columns\" in Meds & Orders section of the refill encounter.              "

## 2018-06-25 ENCOUNTER — OFFICE VISIT (OUTPATIENT)
Dept: FAMILY MEDICINE | Facility: CLINIC | Age: 43
End: 2018-06-25
Payer: COMMERCIAL

## 2018-06-25 VITALS
TEMPERATURE: 95.4 F | RESPIRATION RATE: 16 BRPM | HEART RATE: 75 BPM | DIASTOLIC BLOOD PRESSURE: 84 MMHG | WEIGHT: 238 LBS | OXYGEN SATURATION: 98 % | SYSTOLIC BLOOD PRESSURE: 132 MMHG | BODY MASS INDEX: 34.07 KG/M2 | HEIGHT: 70 IN

## 2018-06-25 DIAGNOSIS — I10 BENIGN ESSENTIAL HYPERTENSION: ICD-10-CM

## 2018-06-25 DIAGNOSIS — Z86.19 HISTORY OF COLD SORES: ICD-10-CM

## 2018-06-25 DIAGNOSIS — Z11.3 SCREEN FOR STD (SEXUALLY TRANSMITTED DISEASE): ICD-10-CM

## 2018-06-25 DIAGNOSIS — E78.2 MIXED HYPERLIPIDEMIA: ICD-10-CM

## 2018-06-25 DIAGNOSIS — E11.65 TYPE 2 DIABETES MELLITUS WITH HYPERGLYCEMIA, UNSPECIFIED LONG TERM INSULIN USE STATUS: Primary | ICD-10-CM

## 2018-06-25 DIAGNOSIS — J45.998 ASTHMA, PERSISTENT CONTROLLED: ICD-10-CM

## 2018-06-25 LAB
ALBUMIN SERPL-MCNC: 4 G/DL (ref 3.4–5)
ALP SERPL-CCNC: 72 U/L (ref 40–150)
ALT SERPL W P-5'-P-CCNC: 50 U/L (ref 0–70)
ANION GAP SERPL CALCULATED.3IONS-SCNC: 9 MMOL/L (ref 3–14)
AST SERPL W P-5'-P-CCNC: 28 U/L (ref 0–45)
BILIRUB SERPL-MCNC: 1.7 MG/DL (ref 0.2–1.3)
BUN SERPL-MCNC: 15 MG/DL (ref 7–30)
CALCIUM SERPL-MCNC: 9.1 MG/DL (ref 8.5–10.1)
CHLORIDE SERPL-SCNC: 104 MMOL/L (ref 94–109)
CHOLEST SERPL-MCNC: 152 MG/DL
CO2 SERPL-SCNC: 25 MMOL/L (ref 20–32)
CREAT SERPL-MCNC: 0.95 MG/DL (ref 0.66–1.25)
CREAT UR-MCNC: 129 MG/DL
GFR SERPL CREATININE-BSD FRML MDRD: 87 ML/MIN/1.7M2
GLUCOSE SERPL-MCNC: 185 MG/DL (ref 70–99)
HBA1C MFR BLD: 5.6 % (ref 0–5.6)
HBV SURFACE AG SERPL QL IA: NONREACTIVE
HCV AB SERPL QL IA: NONREACTIVE
HDLC SERPL-MCNC: 54 MG/DL
HIV 1+2 AB+HIV1 P24 AG SERPL QL IA: NONREACTIVE
LDLC SERPL CALC-MCNC: 79 MG/DL
MICROALBUMIN UR-MCNC: 25 MG/L
MICROALBUMIN/CREAT UR: 19.3 MG/G CR (ref 0–17)
NONHDLC SERPL-MCNC: 98 MG/DL
POTASSIUM SERPL-SCNC: 4.4 MMOL/L (ref 3.4–5.3)
PROT SERPL-MCNC: 7.5 G/DL (ref 6.8–8.8)
SODIUM SERPL-SCNC: 138 MMOL/L (ref 133–144)
TRIGL SERPL-MCNC: 97 MG/DL
TSH SERPL DL<=0.005 MIU/L-ACNC: 0.56 MU/L (ref 0.4–4)

## 2018-06-25 PROCEDURE — 82043 UR ALBUMIN QUANTITATIVE: CPT | Performed by: FAMILY MEDICINE

## 2018-06-25 PROCEDURE — 86803 HEPATITIS C AB TEST: CPT | Performed by: FAMILY MEDICINE

## 2018-06-25 PROCEDURE — 83036 HEMOGLOBIN GLYCOSYLATED A1C: CPT | Performed by: FAMILY MEDICINE

## 2018-06-25 PROCEDURE — 86780 TREPONEMA PALLIDUM: CPT | Performed by: FAMILY MEDICINE

## 2018-06-25 PROCEDURE — 86695 HERPES SIMPLEX TYPE 1 TEST: CPT | Performed by: FAMILY MEDICINE

## 2018-06-25 PROCEDURE — 80053 COMPREHEN METABOLIC PANEL: CPT | Performed by: FAMILY MEDICINE

## 2018-06-25 PROCEDURE — 87340 HEPATITIS B SURFACE AG IA: CPT | Performed by: FAMILY MEDICINE

## 2018-06-25 PROCEDURE — 86696 HERPES SIMPLEX TYPE 2 TEST: CPT | Performed by: FAMILY MEDICINE

## 2018-06-25 PROCEDURE — 87491 CHLMYD TRACH DNA AMP PROBE: CPT | Performed by: FAMILY MEDICINE

## 2018-06-25 PROCEDURE — 80061 LIPID PANEL: CPT | Performed by: FAMILY MEDICINE

## 2018-06-25 PROCEDURE — 99214 OFFICE O/P EST MOD 30 MIN: CPT | Performed by: FAMILY MEDICINE

## 2018-06-25 PROCEDURE — 87389 HIV-1 AG W/HIV-1&-2 AB AG IA: CPT | Performed by: FAMILY MEDICINE

## 2018-06-25 PROCEDURE — 84443 ASSAY THYROID STIM HORMONE: CPT | Performed by: FAMILY MEDICINE

## 2018-06-25 PROCEDURE — 87591 N.GONORRHOEAE DNA AMP PROB: CPT | Performed by: FAMILY MEDICINE

## 2018-06-25 PROCEDURE — 36415 COLL VENOUS BLD VENIPUNCTURE: CPT | Performed by: FAMILY MEDICINE

## 2018-06-25 RX ORDER — LIRAGLUTIDE 6 MG/ML
INJECTION SUBCUTANEOUS
Qty: 6 ML | Refills: 6 | Status: SHIPPED | OUTPATIENT
Start: 2018-06-25 | End: 2019-02-21

## 2018-06-25 RX ORDER — LISINOPRIL 10 MG/1
TABLET ORAL
Qty: 90 TABLET | Refills: 3 | Status: SHIPPED | OUTPATIENT
Start: 2018-06-25 | End: 2019-07-15

## 2018-06-25 RX ORDER — METFORMIN HCL 500 MG
TABLET, EXTENDED RELEASE 24 HR ORAL
Qty: 360 TABLET | Refills: 1 | Status: SHIPPED | OUTPATIENT
Start: 2018-06-25 | End: 2019-01-22

## 2018-06-25 RX ORDER — SIMVASTATIN 40 MG
TABLET ORAL
Qty: 90 TABLET | Refills: 3 | Status: SHIPPED | OUTPATIENT
Start: 2018-06-25 | End: 2019-07-15

## 2018-06-25 NOTE — PROGRESS NOTES
-A1C (test of diabetes control the last 2-3 months) is at your goal. Please recheck your A1C test in 6 months.

## 2018-06-25 NOTE — NURSING NOTE
"Chief Complaint   Patient presents with     Diabetes     Asthma     BP (!) 134/92  Pulse 75  Temp 95.4  F (35.2  C) (Oral)  Resp 16  Ht 5' 10\" (1.778 m)  Wt 238 lb (108 kg)  SpO2 98%  BMI 34.15 kg/m2 Estimated body mass index is 34.15 kg/(m^2) as calculated from the following:    Height as of this encounter: 5' 10\" (1.778 m).    Weight as of this encounter: 238 lb (108 kg).  bp completed using cuff size: regular       Health Maintenance addressed:  BP was high, used pink card, recheck manually and ACT    Possibly completing today per provider review.    Jolie Shen MA     "

## 2018-06-25 NOTE — PROGRESS NOTES
SUBJECTIVE:   Jeanmarie Mckinley is a 42 year old male who presents to clinic today for the following health issues:      Diabetes Follow-up      Patient is checking blood sugars: not at all    Diabetic concerns: None     Symptoms of hypoglycemia (low blood sugar): none     Paresthesias (numbness or burning in feet) or sores: No     Date of last diabetic eye exam: within a year     BP Readings from Last 2 Encounters:   06/25/18 132/84   11/02/17 132/72     Hemoglobin A1C (%)   Date Value   06/25/2018 5.6   11/02/2017 5.1     LDL Cholesterol Calculated (mg/dL)   Date Value   05/24/2017 65   12/26/2016 86     Asthma Follow-Up    Was ACT completed today?    Yes    ACT Total Scores 6/25/2018   ACT TOTAL SCORE (Goal Greater than or Equal to 20) 21   In the past 12 months, how many times did you visit the emergency room for your asthma without being admitted to the hospital? 0   In the past 12 months, how many times were you hospitalized overnight because of your asthma? 0       Recent asthma triggers that patient is dealing with: allergies and sickness         Amount of exercise or physical activity: 4-5 days/week for an average of 15-30 minutes    Problems taking medications regularly: No    Medication side effects: none    Diet: regular (no restrictions)        PROBLEMS TO ADD ON...    Problem list and histories reviewed & adjusted, as indicated.  Additional history: as documented    Patient Active Problem List   Diagnosis     Asthma, persistent controlled     Benign essential hypertension     Type 2 diabetes mellitus with hyperglycemia (H)     Mixed hyperlipidemia     BMI 34.0-34.9,adult     Fish allergy     Type 2 diabetes mellitus with hyperglycemia, unspecified long term insulin use status (H)     Acute bilateral low back pain without sciatica     Somatic dysfunction of sacral region     Sacral pain     Thoracic segment dysfunction     Poor posture     Spasm of back muscles     History reviewed. No pertinent  "surgical history.    Social History   Substance Use Topics     Smoking status: Never Smoker     Smokeless tobacco: Never Used     Alcohol use 0.0 oz/week     0 Standard drinks or equivalent per week      Comment: 1 drink per day     Family History   Problem Relation Age of Onset     Diabetes Mother      Hypertension Mother      Glaucoma No family hx of      Macular Degeneration No family hx of            Reviewed and updated as needed this visit by clinical staff  Tobacco  Allergies  Meds  Med Hx  Surg Hx  Fam Hx  Soc Hx      Reviewed and updated as needed this visit by Provider         ROS:  Constitutional, HEENT, cardiovascular, pulmonary, gi and gu systems are negative, except as otherwise noted.    OBJECTIVE:     /84  Pulse 75  Temp 95.4  F (35.2  C) (Oral)  Resp 16  Ht 5' 10\" (1.778 m)  Wt 238 lb (108 kg)  SpO2 98%  BMI 34.15 kg/m2  Body mass index is 34.15 kg/(m^2).  GENERAL: healthy, alert and no distress  NECK: no adenopathy, no asymmetry, masses, or scars and thyroid normal to palpation  RESP: lungs clear to auscultation - no rales, rhonchi or wheezes  CV: regular rate and rhythm, normal S1 S2, no S3 or S4, no murmur, click or rub, no peripheral edema and peripheral pulses strong  ABDOMEN: soft, nontender, no hepatosplenomegaly, no masses and bowel sounds normal  SKIN: no suspicious lesions or rashes  NEURO: Normal strength and tone, mentation intact and speech normal  PSYCH: mentation appears normal, affect normal/bright  Diabetic foot exam: normal DP and PT pulses, no trophic changes or ulcerative lesions and normal sensory exam    Diagnostic Test Results:  Results for orders placed or performed in visit on 06/25/18 (from the past 24 hour(s))   Hemoglobin A1c   Result Value Ref Range    Hemoglobin A1C 5.6 0 - 5.6 %       ASSESSMENT/PLAN:   1. Type 2 diabetes mellitus with hyperglycemia, unspecified long term insulin use status (H)  Chronic & controlled on current medications  Refilled " given  Follow up in 6 months    - Lipid panel reflex to direct LDL Fasting  - Albumin Random Urine Quantitative with Creat Ratio  - TSH with free T4 reflex  - Comprehensive metabolic panel  - Hemoglobin A1c  - metFORMIN (GLUCOPHAGE-XR) 500 MG 24 hr tablet; TAKE 2 TABLETS (1,000 MG) BY MOUTH 2 TIMES DAILY (WITH MEALS)  Dispense: 360 tablet; Refill: 1  - liraglutide (VICTOZA PEN) 18 MG/3ML soln; INJECT 0.6 MG SUBCUTANEOUS DAILY  Dispense: 6 mL; Refill: 6    2. Mixed hyperlipidemia  Stable   - simvastatin (ZOCOR) 40 MG tablet; TAKE 1 TABLET (40 MG) BY MOUTH AT BEDTIME  Dispense: 90 tablet; Refill: 3    3. Benign essential hypertension  Controled & no medications changes needed  Refilled provided   - lisinopril (PRINIVIL/ZESTRIL) 10 MG tablet; TAKE 1 TABLET BY MOUTH DAILY (EVERY 24 HOURS).  Dispense: 90 tablet; Refill: 3    4. Asthma, persistent controlled  -controlled/needs refill  - fluticasone-salmeterol (ADVAIR DISKUS) 250-50 MCG/DOSE diskus inhaler; Inhale 1 puff into the lungs 2 times daily  Dispense: 3 Inhaler; Refill: 1    5. Screen for STD (sexually transmitted disease)    - Chlamydia trachomatis PCR  - Neisseria gonorrhoeae PCR  - Hepatitis C antibody  - Hepatitis B surface antigen  - HIV Antigen Antibody Combo  - Treponema Abs w Reflex to RPR and Titer  - Herpes Simplex Virus 1 and 2 IgG    6. History of cold sores    - Herpes Simplex Virus 1 and 2 IgG    Krista Juares MD  [facility]

## 2018-06-25 NOTE — PROGRESS NOTES
-Liver and gallbladder tests are normal. (ALT,AST, Alk phos, bilirubin), kidney function is normal (Cr, GFR), Sodium is normal, Potassium is normal, Calcium is normal, Glucose is normal (diabetes screening test).   -Cholesterol levels are at your goal levels.  ADVISE: Continuing your medication, a regular exercise program with at least 30 minutes of aerobic exercise 3-4 days/week ( 45 minutes 4-6 days/week if weight loss needed), and a low saturated fat,/low carbohydrate diet are helpful to maintain this.  Rechecking your fasting cholesterol panel in 12 months is recommended (Lipid w/ LDL reflex).  -TSH (thyroid stimulating hormone) level is normal which indicates normal thyroid function.  -Microalbumin (urine protein) level is elevated. This is suggestive of early damage to your kidneys from high blood pressure and diabetes.  ADVISE: avoiding anti-inflamatory agents such as ibuprofen (Advil, Motrin) or naproxen (Aleve) as much as possible, keeping your blood pressure in a normal range, and continuing your medication that helps protect your kidneys.  Recheck in 1 year.

## 2018-06-25 NOTE — MR AVS SNAPSHOT
After Visit Summary   6/25/2018    Jeanmarie Mckinley    MRN: [id number]           Patient Information     Date Of Birth          1975        Visit Information        Provider Department      6/25/2018 8:00 AM Krista Juares MD [facility]        Today's Diagnoses     Type 2 diabetes mellitus with hyperglycemia, unspecified long term insulin use status (H)    -  1    Mixed hyperlipidemia        Benign essential hypertension        Asthma, persistent controlled        Screen for STD (sexually transmitted disease)        History of cold sores           Follow-ups after your visit        Who to contact     If you have questions or need follow up information about today's clinic visit or your schedule please contact [facility] directly at [phone].  Normal or non-critical lab and imaging results will be communicated to you by MyChart, letter or phone within 4 business days after the clinic has received the results. If you do not hear from us within 7 days, please contact the clinic through [employer] or phone. If you have a critical or abnormal lab result, we will notify you by phone as soon as possible.  Submit refill requests through [facility] or call your pharmacy and they will forward the refill request to us. Please allow 3 business days for your refill to be completed.          Additional Information About Your Visit        MyChart Information     [facility] gives you secure access to your electronic health record. If you see a primary care provider, you can also send messages to your care team and make appointments. If you have questions, please call your primary care clinic.  If you do not have a primary care provider, please call [phone] and they will assist you.        Care EveryWhere ID     This is your Care EveryWhere ID. This could be used by other organizations to access your [place] medical records  [id number]        Your Vitals Were     Pulse Temperature  "Respirations Height Pulse Oximetry BMI (Body Mass Index)    75 95.4  F (35.2  C) (Oral) 16 5' 10\" (1.778 m) 98% 34.15 kg/m2       Blood Pressure from Last 3 Encounters:   06/28/18 115/78   06/25/18 132/84   11/02/17 132/72    Weight from Last 3 Encounters:   06/28/18 239 lb 8 oz (108.6 kg)   06/25/18 238 lb (108 kg)   11/02/17 232 lb (105.2 kg)              We Performed the Following     Albumin Random Urine Quantitative with Creat Ratio     Chlamydia trachomatis PCR     Comprehensive metabolic panel     Hemoglobin A1c     Hepatitis B surface antigen     Hepatitis C antibody     Herpes Simplex Virus 1 and 2 IgG     HIV Antigen Antibody Combo     Lipid panel reflex to direct LDL Fasting     Neisseria gonorrhoeae PCR     Treponema Abs w Reflex to RPR and Titer     TSH with free T4 reflex          Where to get your medicines      These medications were sent to [facility] #[id number] - [place], [place] - [address] [address] [facility] [address]     Phone:  [phone]     fluticasone-salmeterol 250-50 MCG/DOSE diskus inhaler    liraglutide 18 MG/3ML soln    lisinopril 10 MG tablet    metFORMIN 500 MG 24 hr tablet    simvastatin 40 MG tablet          Primary Care Provider Office Phone # Fax #    Krista Juares [phone] [address] [facility] [address]        Equal Access to Services     [facility] AH: Hadii flex ku hadasho Sojuan, waaxda luqadaha, qaybta kaalmada adeegyada, chanda holder . So [facility] [phone].    ATENCIÓN: Si habla español, tiene a gutierrez disposición servicios gratuitos de asistencia lingüística. Remy foy [phone].    We comply with applicable federal civil rights laws and [place] laws. We do not discriminate on the basis of race, color, national origin, age, disability, sex, sexual orientation, or gender identity.            Thank you!     Thank you for choosing [facility]  for your care. Our goal is always to " provide you with excellent care. Hearing back from our patients is one way we can continue to improve our services. Please take a few minutes to complete the written survey that you may receive in the mail after your visit with us. Thank you!             Your Updated Medication List - Protect others around you: Learn how to safely use, store and throw away your medicines at www.disposemymeds.org.          This list is accurate as of 6/25/18 11:59 PM.  Always use your most recent med list.                   Brand Name Dispense Instructions for use Diagnosis    ASPIRIN NOT PRESCRIBED    INTENTIONAL     Antiplatelet medication not prescribed intentionally due to Not indicated based on age    Diabetes mellitus type 2, controlled (H)       BD VICENTE U/F 32G X 4 MM   Generic drug:  insulin pen needle     100 each    USE ONCE DAILY AS DIRECTED.    Controlled type 2 diabetes mellitus without complication, without long-term current use of insulin (H)       clotrimazole 1 % cream    LOTRIMIN    30 g    Apply topically 2 times daily    Candida rash of groin       fluticasone-salmeterol 250-50 MCG/DOSE diskus inhaler    ADVAIR DISKUS    3 Inhaler    Inhale 1 puff into the lungs 2 times daily    Asthma, persistent controlled       FREESTYLE LITE test strip   Generic drug:  blood glucose monitoring     200 each    Test twice daily    Diabetes mellitus type 2, controlled (H)       liraglutide 18 MG/3ML soln    VICTOZA PEN    6 mL    INJECT 0.6 MG SUBCUTANEOUS DAILY    Type 2 diabetes mellitus with hyperglycemia, unspecified long term insulin use status (H)       lisinopril 10 MG tablet    PRINIVIL/ZESTRIL    90 tablet    TAKE 1 TABLET BY MOUTH DAILY (EVERY 24 HOURS).    Benign essential hypertension       metFORMIN 500 MG 24 hr tablet    GLUCOPHAGE-XR    360 tablet    TAKE 2 TABLETS (1,000 MG) BY MOUTH 2 TIMES DAILY (WITH MEALS)    Type 2 diabetes mellitus with hyperglycemia, unspecified long term insulin use status (H)        MULTIVITAMIN & MINERAL PO           omega-3 acid ethyl esters 1 g capsule    Lovaza     Take 1 capsule by mouth daily        PROBIOTIC ACIDOPHILUS PO           simvastatin 40 MG tablet    ZOCOR    90 tablet    TAKE 1 TABLET (40 MG) BY MOUTH AT BEDTIME    Mixed hyperlipidemia       VENTOLIN  (90 Base) MCG/ACT Inhaler   Generic drug:  albuterol     1 Inhaler    INHALE 2 PUFFS BY MOUTH EVERY 4 HOURS AS NEEDED.    Asthma, persistent controlled

## 2018-06-26 LAB
C TRACH DNA SPEC QL NAA+PROBE: NEGATIVE
HSV1 IGG SERPL QL IA: >8 AI (ref 0–0.8)
HSV2 IGG SERPL QL IA: <0.2 AI (ref 0–0.8)
N GONORRHOEA DNA SPEC QL NAA+PROBE: NEGATIVE
SPECIMEN SOURCE: NORMAL
SPECIMEN SOURCE: NORMAL
T PALLIDUM AB SER QL: NONREACTIVE

## 2018-06-26 ASSESSMENT — ASTHMA QUESTIONNAIRES: ACT_TOTALSCORE: 21

## 2018-06-28 ENCOUNTER — OFFICE VISIT (OUTPATIENT)
Dept: PHARMACY | Facility: CLINIC | Age: 43
End: 2018-06-28
Payer: COMMERCIAL

## 2018-06-28 VITALS — BODY MASS INDEX: 34.36 KG/M2 | SYSTOLIC BLOOD PRESSURE: 115 MMHG | WEIGHT: 239.5 LBS | DIASTOLIC BLOOD PRESSURE: 78 MMHG

## 2018-06-28 DIAGNOSIS — J45.998 ASTHMA, PERSISTENT CONTROLLED: ICD-10-CM

## 2018-06-28 DIAGNOSIS — E11.65 TYPE 2 DIABETES MELLITUS WITH HYPERGLYCEMIA, UNSPECIFIED LONG TERM INSULIN USE STATUS: Primary | ICD-10-CM

## 2018-06-28 DIAGNOSIS — J30.2 SEASONAL ALLERGIC RHINITIS, UNSPECIFIED CHRONICITY, UNSPECIFIED TRIGGER: ICD-10-CM

## 2018-06-28 DIAGNOSIS — E63.9 NUTRITIONAL DEFICIENCY: ICD-10-CM

## 2018-06-28 DIAGNOSIS — E78.2 MIXED HYPERLIPIDEMIA: ICD-10-CM

## 2018-06-28 DIAGNOSIS — I10 BENIGN ESSENTIAL HYPERTENSION: ICD-10-CM

## 2018-06-28 DIAGNOSIS — B35.6 JOCK ITCH: ICD-10-CM

## 2018-06-28 PROCEDURE — 99607 MTMS BY PHARM ADDL 15 MIN: CPT | Performed by: PHARMACIST

## 2018-06-28 PROCEDURE — 99605 MTMS BY PHARM NP 15 MIN: CPT | Performed by: PHARMACIST

## 2018-06-28 NOTE — PROGRESS NOTES
SUBJECTIVE/OBJECTIVE:                Jeanmarie Mckinley is a 42 year old male coming in for a follow-up visit for Medication Therapy Management.  He was referred to me from Dr. Juares.     Chief Complaint: Follow up from our visit on 11/2/17.  General medication review - no specific concerns today.    Tobacco: No tobacco use  Alcohol: Less than 1 beverages / week    Medication Adherence/Access:  Issues reported - see below    Diabetes:  Pt currently taking metformin 2,000 XR daily and Victoza 0.6mg daily. His Victoza dose was reduced from 1.2mg about 3 months ago. Pt is not experiencing side effects.  SMBG: once every other week, inconsistent lately. Ranges (patient reported): 100-120 morning, 140-160 2h post-prandial.  Symptoms of low blood sugar? none. Frequency of hypoglycemia? never.  Recent symptoms of high blood sugar? None.   Eye exam: UTD  Foot exam: UTD  Microalbumin is not < 30 mg/g. Pt is taking an ACEi/ARB.  Aspirin: Not taking due to patient age less than 50  Diet/Exercise: He has gained some weight and wants to work on diet. Walks 4 times per week for 30-60 minutes.     Allergies:  Pt would use Zyrtec or Flonase if needed. Symptoms have been good and hasn't needed meds lately. Current regimen is effective.    Asthma:  Current asthma medications: Fluticasone+Salmeterol (Advair) 250-50 mcg BID (usually only uses once daily). Also has Ventolin PRN (has used a couple times in last month, mostly with exercise). He feels breathing has been really good lately. Pt rinses mouth after using steroid inhaler. Asthma triggers include: upper respiratory infections and exercise or sports.   Pt reports the following symptoms: none.   AAP on file: YES  ACT Total Scores 5/24/2017 11/2/2017 6/25/2018   ACT TOTAL SCORE (Goal Greater than or Equal to 20) 9 23 21   In the past 12 months, how many times did you visit the emergency room for your asthma without being admitted to the hospital? 0 0 0   In the past 12 months, how  many times were you hospitalized overnight because of your asthma? 0 0 0     Hypertension: Current medications include lisinopril 10 mg daily.  Patient does not self-monitor BP.  Patient reports no current medication side effects.    Hyperlipidemia: Current therapy includes simvastatin 40 mg once daily at dinner.  Pt is wondering if any or his medications could cause muscle pains. He had a sore shoulder from wearing a backpack and an instance where his back went out.   The 10-year ASCVD risk score (Rosy KATT Jr, et al., 2013) is: 8%    Values used to calculate the score:      Age: 42 years      Sex: Male      Is Non- : Yes      Diabetic: Yes      Tobacco smoker: No      Systolic Blood Pressure: 115 mmHg      Is BP treated: Yes      HDL Cholesterol: 54 mg/dL      Total Cholesterol: 152 mg/dL    Supplements: Pt is currently taking MVI daily, probiotic daily, Omega 3 fatty acids (unknown dose) daily. He takes these for general health and would like to continue. He reports no side effects.    Jock Itch: Pt has clotrimazole cream on hand, hasn't needed to use lately, helpful when needed. No concern today.     Current labs include:  Today's Vitals: /78  Wt 239 lb 8 oz (108.6 kg)  BMI 34.36 kg/m2  BP Readings from Last 3 Encounters:   06/25/18 132/84   11/02/17 132/72   11/02/17 132/72     Lab Results   Component Value Date    A1C 5.6 06/25/2018   .  Lab Results   Component Value Date    CHOL 152 06/25/2018     Lab Results   Component Value Date    TRIG 97 06/25/2018     Lab Results   Component Value Date    HDL 54 06/25/2018     Lab Results   Component Value Date    LDL 79 06/25/2018       Liver Function Studies -   Recent Labs   Lab Test  06/25/18   0856   PROTTOTAL  7.5   ALBUMIN  4.0   BILITOTAL  1.7*   ALKPHOS  72   AST  28   ALT  50       Lab Results   Component Value Date    UCRR 129 06/25/2018    MICROL 25 06/25/2018    UMALCR 19.30 (H) 06/25/2018       Last Basic Metabolic Panel:  Lab  Results   Component Value Date     06/25/2018      Lab Results   Component Value Date    POTASSIUM 4.4 06/25/2018     Lab Results   Component Value Date    CHLORIDE 104 06/25/2018     Lab Results   Component Value Date    BUN 15 06/25/2018     Lab Results   Component Value Date    CR 0.95 06/25/2018     GFR Estimate If Black   Date Value Ref Range Status   06/25/2018 >90 >60 mL/min/1.7m2 Final     Comment:      GFR Calc   05/24/2017 >90   GFR Calc   >60 mL/min/1.7m2 Final   12/26/2016 >90   GFR Calc   >60 mL/min/1.7m2 Final       Most Recent Immunizations   Administered Date(s) Administered     Influenza (IIV3) PF 10/12/2016     Influenza Vaccine IM 3yrs+ 4 Valent IIV4 10/29/2017     Pneumo Conj 13-V (2010&after) 10/12/2016     Pneumococcal 23 valent 11/30/2009     TD (ADULT, 7+) 04/05/2016       ASSESSMENT:              Current medications were reviewed today as discussed above.      Medication Adherence: fair, issues identified - see below    Diabetes: Stable. Patient is meeting A1c goal of < 7%. Self monitoring of blood glucose is at goal of fasting  mg/dL and post prandial < 180 mg/dL. Discussed potentially stopping Victoza as I would expect A1c to remain at goal, but pt would like to continue for weight loss benefits.    Allergies: Stable.      Asthma: Needs improvement. Up to date and at goal of ACT > or equal to 20. Pt would benefit from adherence to Advair twice daily as duration of action is not adequate for once daily dosing. Discussed switching to once daily inhaler, but pt doesn't feel like doing Advair twice daily will be an issue. May consider stepping down therapy in future.    Hypertension: Stable. Patient is meeting BP goal of < 140/90mmHg.     Hyperlipidemia: Needs Improvement. Simvastatin may be more effective if taken at bedtime and pt does not think this would be an issue for adherence. Pt is not on high intensity statin which is  indicated based on 2013 ACC/AHA guidelines for lipid management. I would expect LDL to remain above 40 mg/dL if switched from moderate to high intensity. Discussed risks/benefits with pt, he would like to wait until lipid panel is checked next before considering increasing statin therapy.    Supplements: Stable.    Jock Itch: Stable.     PLAN:                  1. Encouraged adherence to Advair twice daily.   2. Pt to take simvastatin at bedtime, as long as adherence is ok.   3. Future considerations: increase to high intensity statin pending next lipid panel, step down asthma therapy    I spent 20 minutes with this patient today. All changes were made via collaborative practice agreement with Krista Juares. A copy of the visit note was provided to the patient's primary care provider.     Will follow up in 6 months, sooner if needed.    The patient was given a summary of these recommendations as an after visit summary.    Kortney Daigle, PharmD  Pharmaceutical Care Resident  Pager: [phone]    Tie-in: Jeanmarie Mckinley was seen independently by Dr. Daigle. I have reviewed the assessment and plan. Faith Montague, AndersD, VITO, BCACP

## 2018-06-28 NOTE — PATIENT INSTRUCTIONS
Recommendations from today's MTM visit:                                                    MTM (medication therapy management) is a service provided by a clinical pharmacist designed to help you get the most of out of your medicines.   Today we reviewed what your medicines are for, how to know if they are working, that your medicines are safe and how to make your medicine regimen as easy as possible.     1. Please use Advair twice daily. This inhaler doesn't last long enough to be taken once daily.    2. If it's convenient and you are able to remember, you can switch simvastatin to take at bedtime. It may work better this way because your body makes cholesterol overnight.      Next MTM visit: 6 months, sooner if needed    To schedule another MTM appointment, please call the clinic directly or you may call the MTM scheduling line at [phone] or toll-free at [phone].     My Clinical Pharmacist's contact information:                                                      It was a pleasure seeing you today!  Please feel free to contact me with any questions or concerns you have.      Kortney Daigle, PharmD  Pharmaceutical Care Resident  Pager: [phone]    You may receive a survey about the MTM services you received.  I would appreciate your feedback to help me serve you better in the future. Please fill it out and return it when you can. Your comments will be anonymous.

## 2018-06-28 NOTE — MR AVS SNAPSHOT
After Visit Summary   6/28/2018    Jeanmarie Mckinley    MRN: [id number]           Patient Information     Date Of Birth          1975        Visit Information        Provider Department      6/28/2018 9:30 AM Faith Montague, [facility] MTM        Care Instructions    Recommendations from today's MTM visit:                                                    MTM (medication therapy management) is a service provided by a clinical pharmacist designed to help you get the most of out of your medicines.   Today we reviewed what your medicines are for, how to know if they are working, that your medicines are safe and how to make your medicine regimen as easy as possible.     1. Please use Advair twice daily. This inhaler doesn't last long enough to be taken once daily.    2. If it's convenient and you are able to remember, you can switch simvastatin to take at bedtime. It may work better this way because your body makes cholesterol overnight.      Next MTM visit: 6 months, sooner if needed    To schedule another MTM appointment, please call the clinic directly or you may call the MTM scheduling line at [phone] or toll-free at [phone].     My Clinical Pharmacist's contact information:                                                      It was a pleasure seeing you today!  Please feel free to contact me with any questions or concerns you have.      Kortney Daigle, PharmD  Pharmaceutical Care Resident  Pager: [phone]    You may receive a survey about the MTM services you received.  I would appreciate your feedback to help me serve you better in the future. Please fill it out and return it when you can. Your comments will be anonymous.            Follow-ups after your visit        Who to contact     If you have questions or need follow up information about today's clinic visit or your schedule please contact [facility] [place] MTM directly at [phone].  Normal or  non-critical lab and imaging results will be communicated to you by MyChart, letter or phone within 4 business days after the clinic has received the results. If you do not hear from us within 7 days, please contact the clinic through [employer] or phone. If you have a critical or abnormal lab result, we will notify you by phone as soon as possible.  Submit refill requests through [employer] or call your pharmacy and they will forward the refill request to us. Please allow 3 business days for your refill to be completed.          Additional Information About Your Visit        Scali Information     [employer] gives you secure access to your electronic health record. If you see a primary care provider, you can also send messages to your care team and make appointments. If you have questions, please call your primary care clinic.  If you do not have a primary care provider, please call [phone] and they will assist you.        Care EveryWhere ID     This is your Care EveryWhere ID. This could be used by other organizations to access your [place] medical records  [id number]         Blood Pressure from Last 3 Encounters:   06/28/18 115/78   06/25/18 132/84   11/02/17 132/72    Weight from Last 3 Encounters:   06/25/18 238 lb (108 kg)   11/02/17 232 lb (105.2 kg)   11/02/17 232 lb 8 oz (105.5 kg)              Today, you had the following     No orders found for display       Primary Care Provider Office Phone # Fax #    Krista Efrain Juares [phone] [address] [facility] [address]        Equal Access to Services     [facility] : Hadii flex oleary hadasho Sotramali, waaxda luqadaha, qaybta kaalmada sundar, chanda holder . So [facility] [phone].    ATENCIÓN: Si habla español, tiene a gutierrez disposición servicios gratuitos de asistencia lingüística. Llame al [phone].    We comply with applicable federal civil rights laws and [place] laws. We do not discriminate  on the basis of race, color, national origin, age, disability, sex, sexual orientation, or gender identity.            Thank you!     Thank you for choosing [facility]  for your care. Our goal is always to provide you with excellent care. Hearing back from our patients is one way we can continue to improve our services. Please take a few minutes to complete the written survey that you may receive in the mail after your visit with us. Thank you!             Your Updated Medication List - Protect others around you: Learn how to safely use, store and throw away your medicines at www.disposemymeds.org.          This list is accurate as of 6/28/18 10:01 AM.  Always use your most recent med list.                   Brand Name Dispense Instructions for use Diagnosis    ASPIRIN NOT PRESCRIBED    INTENTIONAL     Antiplatelet medication not prescribed intentionally due to Not indicated based on age    Diabetes mellitus type 2, controlled (H)       BD VICENTE U/F 32G X 4 MM   Generic drug:  insulin pen needle     100 each    USE ONCE DAILY AS DIRECTED.    Controlled type 2 diabetes mellitus without complication, without long-term current use of insulin (H)       clotrimazole 1 % cream    LOTRIMIN    30 g    Apply topically 2 times daily    Candida rash of groin       fluticasone-salmeterol 250-50 MCG/DOSE diskus inhaler    ADVAIR DISKUS    3 Inhaler    Inhale 1 puff into the lungs 2 times daily    Asthma, persistent controlled       FREESTYLE LITE test strip   Generic drug:  blood glucose monitoring     200 each    Test twice daily    Diabetes mellitus type 2, controlled (H)       liraglutide 18 MG/3ML soln    VICTOZA PEN    6 mL    INJECT 0.6 MG SUBCUTANEOUS DAILY    Type 2 diabetes mellitus with hyperglycemia, unspecified long term insulin use status (H)       lisinopril 10 MG tablet    PRINIVIL/ZESTRIL    90 tablet    TAKE 1 TABLET BY MOUTH DAILY (EVERY 24 HOURS).    Benign essential hypertension       metFORMIN  500 MG 24 hr tablet    GLUCOPHAGE-XR    360 tablet    TAKE 2 TABLETS (1,000 MG) BY MOUTH 2 TIMES DAILY (WITH MEALS)    Type 2 diabetes mellitus with hyperglycemia, unspecified long term insulin use status (H)       MULTIVITAMIN & MINERAL PO           omega-3 acid ethyl esters 1 g capsule    Lovaza     Take 1 capsule by mouth daily        PROBIOTIC ACIDOPHILUS PO           simvastatin 40 MG tablet    ZOCOR    90 tablet    TAKE 1 TABLET (40 MG) BY MOUTH AT BEDTIME    Mixed hyperlipidemia       VENTOLIN  (90 Base) MCG/ACT Inhaler   Generic drug:  albuterol     1 Inhaler    INHALE 2 PUFFS BY MOUTH EVERY 4 HOURS AS NEEDED.    Asthma, persistent controlled

## 2018-07-12 NOTE — PROGRESS NOTES
Negative chlamydia & gonorrhea   Negative HEPATITIS B VIRUS  Negative -HEPATITIS C VIRUS    Negative HIV, human immunodeficiency virus  and syphilis  Positive Herpes type 1 , that could be from  Previous history of cold sore  Negative herpes type 2

## 2018-07-21 DIAGNOSIS — I10 BENIGN ESSENTIAL HYPERTENSION: ICD-10-CM

## 2018-07-21 DIAGNOSIS — E78.2 MIXED HYPERLIPIDEMIA: ICD-10-CM

## 2018-07-23 DIAGNOSIS — E78.2 MIXED HYPERLIPIDEMIA: ICD-10-CM

## 2018-07-23 RX ORDER — LISINOPRIL 10 MG/1
TABLET ORAL
Start: 2018-07-23

## 2018-07-23 RX ORDER — SIMVASTATIN 40 MG
TABLET ORAL
Start: 2018-07-23

## 2018-07-24 RX ORDER — SIMVASTATIN 40 MG
TABLET ORAL
Qty: 90 TABLET | Refills: 2 | Status: SHIPPED | OUTPATIENT
Start: 2018-07-24 | End: 2019-02-21

## 2018-07-24 NOTE — TELEPHONE ENCOUNTER
Patient has refills remaining at pharmacy.  However, pharmacy wrote noted they did not have Rx on file even though it was noted to be received by pharmacy on 6/25/2018.  Will resend with 2 refills.  Colleen Adames RN  [employer] Triage

## 2018-12-18 DIAGNOSIS — J45.998 ASTHMA, PERSISTENT CONTROLLED: ICD-10-CM

## 2018-12-18 NOTE — TELEPHONE ENCOUNTER
"Due for ACT  Sent to pt via Saman OSORIO RN    Requested Prescriptions   Pending Prescriptions Disp Refills     ADVAIR DISKUS 250-50 MCG/DOSE inhaler [Pharmacy Med Name: ADVAIR 250-50 DISKUS]  1     Sig: INHALE 1 PUFF INTO THE LUNGS TWICE DAILY    Inhaled Steroids Protocol Passed - 12/18/2018  2:18 AM       Passed - Patient is age 12 or older       Passed - Asthma control assessment score within normal limits in last 6 months    Please review ACT score.          Passed - Recent (6 mo) or future (30 days) visit within the authorizing provider's specialty    Patient had office visit in the last 6 months or has a visit in the next 30 days with authorizing provider or within the authorizing provider's specialty.  See \"Patient Info\" tab in inbasket, or \"Choose Columns\" in Meds & Orders section of the refill encounter.                "

## 2019-01-21 DIAGNOSIS — E11.65 TYPE 2 DIABETES MELLITUS WITH HYPERGLYCEMIA (H): ICD-10-CM

## 2019-01-22 RX ORDER — METFORMIN HCL 500 MG
TABLET, EXTENDED RELEASE 24 HR ORAL
Qty: 120 TABLET | Refills: 0 | Status: SHIPPED | OUTPATIENT
Start: 2019-01-22 | End: 2019-02-21

## 2019-01-22 NOTE — TELEPHONE ENCOUNTER
"Medication is being filled for 1 time refill only due to:  Patient needs to be seen because due for 6 month diabetes f/u.   Fatemeh OSORIO RN    Requested Prescriptions   Pending Prescriptions Disp Refills     metFORMIN (GLUCOPHAGE-XR) 500 MG 24 hr tablet 360 tablet 1     Sig: TAKE 2 TABLETS (1,000 MG) BY MOUTH 2 TIMES DAILY (WITH MEALS)    Biguanide Agents Failed - 1/21/2019 10:41 AM       Failed - Blood pressure less than 140/90 in past 6 months    BP Readings from Last 3 Encounters:   06/28/18 115/78   06/25/18 132/84   11/02/17 132/72                Failed - Patient has documented A1c within the specified period of time.    If HgbA1C is 8 or greater, it needs to be on file within the past 3 months.  If less than 8, must be on file within the past 6 months.     Recent Labs   Lab Test 06/25/18  0856   A1C 5.6            Failed - Recent (6 mo) or future (30 days) visit within the authorizing provider's specialty    Patient had office visit in the last 6 months or has a visit in the next 30 days with authorizing provider or within the authorizing provider's specialty.  See \"Patient Info\" tab in inbasket, or \"Choose Columns\" in Meds & Orders section of the refill encounter.           Passed - Patient has documented LDL within the past 12 mos.    Recent Labs   Lab Test 06/25/18  0856   LDL 79            Passed - Patient has had a Microalbumin in the past 15 mos.    Recent Labs   Lab Test 06/25/18  0856   MICROL 25   UMALCR 19.30*            Passed - Patient is age 10 or older       Passed - Patient's CR is NOT>1.4 OR Patient's EGFR is NOT<45 within past 12 mos.    Recent Labs   Lab Test 06/25/18  0856   GFRESTIMATED 87   GFRESTBLACK >90       Recent Labs   Lab Test 06/25/18  0856   CR 0.95            Passed - Patient does NOT have a diagnosis of CHF.       Passed - Medication is active on med list              "

## 2019-02-21 ENCOUNTER — OFFICE VISIT (OUTPATIENT)
Dept: PHARMACY | Facility: CLINIC | Age: 44
End: 2019-02-21
Payer: COMMERCIAL

## 2019-02-21 VITALS — DIASTOLIC BLOOD PRESSURE: 88 MMHG | SYSTOLIC BLOOD PRESSURE: 124 MMHG | WEIGHT: 243.2 LBS | BODY MASS INDEX: 34.9 KG/M2

## 2019-02-21 DIAGNOSIS — R35.0 INCREASED FREQUENCY OF URINATION: ICD-10-CM

## 2019-02-21 DIAGNOSIS — E11.65 TYPE 2 DIABETES MELLITUS WITH HYPERGLYCEMIA, WITHOUT LONG-TERM CURRENT USE OF INSULIN (H): Primary | ICD-10-CM

## 2019-02-21 DIAGNOSIS — J45.998 ASTHMA, PERSISTENT CONTROLLED: ICD-10-CM

## 2019-02-21 DIAGNOSIS — I10 BENIGN ESSENTIAL HYPERTENSION: ICD-10-CM

## 2019-02-21 DIAGNOSIS — E11.65 TYPE 2 DIABETES MELLITUS WITH HYPERGLYCEMIA, WITHOUT LONG-TERM CURRENT USE OF INSULIN (H): ICD-10-CM

## 2019-02-21 DIAGNOSIS — E78.2 MIXED HYPERLIPIDEMIA: ICD-10-CM

## 2019-02-21 LAB — HBA1C MFR BLD: 7.8 % (ref 0–5.6)

## 2019-02-21 PROCEDURE — 83036 HEMOGLOBIN GLYCOSYLATED A1C: CPT | Performed by: PHARMACIST

## 2019-02-21 PROCEDURE — 36415 COLL VENOUS BLD VENIPUNCTURE: CPT | Performed by: PHARMACIST

## 2019-02-21 PROCEDURE — 99607 MTMS BY PHARM ADDL 15 MIN: CPT | Performed by: PHARMACIST

## 2019-02-21 PROCEDURE — 99605 MTMS BY PHARM NP 15 MIN: CPT | Performed by: PHARMACIST

## 2019-02-21 RX ORDER — LIRAGLUTIDE 6 MG/ML
INJECTION SUBCUTANEOUS
Qty: 18 ML | Refills: 1 | Status: SHIPPED | OUTPATIENT
Start: 2019-02-21 | End: 2019-07-15

## 2019-02-21 RX ORDER — METFORMIN HCL 500 MG
TABLET, EXTENDED RELEASE 24 HR ORAL
Qty: 360 TABLET | Refills: 1 | Status: SHIPPED | OUTPATIENT
Start: 2019-02-21 | End: 2019-07-15

## 2019-02-21 NOTE — LETTER
My Asthma Action Plan  Name: Jeanmarie Mckinley   YOB: 1975  Date: 2/21/2019   My doctor: Faith Montague RPH   My clinic: [facility]        My Control Medicine: Fluticasone + salmeterol (Advair) -  Diskus 250/50 mcg 1 puff twice daily  My Rescue Medicine: Albuterol (Proair/Ventolin/Proventil) inhaler as needed   My Asthma Severity: moderate persistent  Avoid your asthma triggers: smoke and exercise or sports  allergies and sickness             GREEN ZONE   Good Control    I feel good    No cough or wheeze    Can work, sleep and play without asthma symptoms       Take your asthma control medicine every day.     1. If exercise triggers your asthma, take your rescue medication    15 minutes before exercise or sports, and    During exercise if you have asthma symptoms  2. Spacer to use with inhaler: If you have a spacer, make sure to use it with your inhaler             YELLOW ZONE Getting Worse  I have ANY of these:    I do not feel good    Cough or wheeze    Chest feels tight    Wake up at night   1. Keep taking your Green Zone medications  2. Start taking your rescue medicine:    every 20 minutes for up to 1 hour. Then every 4 hours for 24-48 hours.  3. If you stay in the Yellow Zone for more than 12-24 hours, contact your doctor.  4. If you do not return to the Green Zone in 12-24 hours or you get worse, start taking your oral steroid medicine if prescribed by your provider.           RED ZONE Medical Alert - Get Help  I have ANY of these:    I feel awful    Medicine is not helping    Breathing getting harder    Trouble walking or talking    Nose opens wide to breathe       1. Take your rescue medicine NOW  2. If your provider has prescribed an oral steroid medicine, start taking it NOW  3. Call your doctor NOW  4. If you are still in the Red Zone after 20 minutes and you have not reached your doctor:    Take your rescue medicine again and    Call 911 or go to the emergency room  right away    See your regular doctor within 2 weeks of an Emergency Room or Urgent Care visit for follow-up treatment.          Annual Reminders:  Meet with Asthma Educator,  Flu Shot in the Fall, consider Pneumonia Vaccination for patients with asthma (aged 19 and older).    Pharmacy: [id number] - [place], [place] - [address]                      Asthma Triggers  How To Control Things That Make Your Asthma Worse    Triggers are things that make your asthma worse.  Look at the list below to help you find your triggers and what you can do about them.  You can help prevent asthma flare-ups by staying away from your triggers.      Trigger                                                          What you can do   Cigarette Smoke  Tobacco smoke can make asthma worse. Do not allow smoking in your home, car or around you.  Be sure no one smokes at a child s day care or school.  If you smoke, ask your health care provider for ways to help you quit.  Ask family members to quit too.  Ask your health care provider for a referral to Quit Plan to help you quit smoking, or call 5-563-945-PLAN.     Colds, Flu, Bronchitis  These are common triggers of asthma. Wash your hands often.  Don t touch your eyes, nose or mouth.  Get a flu shot every year.     Dust Mites  These are tiny bugs that live in cloth or carpet. They are too small to see. Wash sheets and blankets in hot water every week.   Encase pillows and mattress in dust mite proof covers.  Avoid having carpet if you can. If you have carpet, vacuum weekly.   Use a dust mask and HEPA vacuum.   Pollen and Outdoor Mold  Some people are allergic to trees, grass, or weed pollen, or molds. Try to keep your windows closed.  Limit time out doors when pollen count is high.   Ask you health care provider about taking medicine during allergy season.     Animal Dander  Some people are allergic to skin flakes, urine or saliva from pets with fur or feathers. Keep pets with fur or  feathers out of your home.    If you can t keep the pet outdoors, then keep the pet out of your bedroom.  Keep the bedroom door closed.  Keep pets off cloth furniture and away from stuffed toys.     Mice, Rats, and Cockroaches  Some people are allergic to the waste from these pests.   Cover food and garbage.  Clean up spills and food crumbs.  Store grease in the refrigerator.   Keep food out of the bedroom.   Indoor Mold  This can be a trigger if your home has high moisture. Fix leaking faucets, pipes, or other sources of water.   Clean moldy surfaces.  Dehumidify basement if it is damp and smelly.   Smoke, Strong Odors, and Sprays  These can reduce air quality. Stay away from strong odors and sprays, such as perfume, powder, hair spray, paints, smoke incense, paint, cleaning products, candles and new carpet.   Exercise or Sports  Some people with asthma have this trigger. Be active!  Ask your doctor about taking medicine before sports or exercise to prevent symptoms.    Warm up for 5-10 minutes before and after sports or exercise.     Other Triggers of Asthma  Cold air:  Cover your nose and mouth with a scarf.  Sometimes laughing or crying can be a trigger.  Some medicines and food can trigger asthma.

## 2019-02-21 NOTE — PATIENT INSTRUCTIONS
Recommendations from today's MTM visit:                                                        1. You can move all your oral medications to dinner time if you'd prefer.    2. Increase your Victoza to 1.2 mg daily.    Next MTM visit: will MyChart you in a month    To schedule another MTM appointment, please call the clinic directly or you may call the MTM scheduling line at [phone] or toll-free at [phone].     My Clinical Pharmacist's contact information:                                                      It was a pleasure talking with you today!  Please feel free to contact me with any questions or concerns you have.      Radha Amaral, PharmD  Medication Therapy Management Resident, [facility]  Pager: [phone]    You may receive a survey about the MTM services you received by email and/or US Mail.  I would appreciate your feedback to help me serve you better in the future. Your comments will be anonymous.

## 2019-02-21 NOTE — PROGRESS NOTES
SUBJECTIVE/OBJECTIVE:                Jeanmarie Mckinley is a 43 year old male coming in for a follow-up visit for Medication Therapy Management.  He was referred to me from Dr. Juares.     Chief Complaint: Follow up from visit on 6/28/18 with Kortney Daigle, PharmD. He feels like he needs to increase his dose of Victoza.    Tobacco: No tobacco use  Alcohol: Less than 1 beverages / week    Medication Adherence/Access:  no issues reported - sometimes forgets second daily dose of Advair    Diabetes:  Pt currently taking metformin 1000mg BID and Victoza 0.6mg daily. Pt is not experiencing side effects. He likes Victoza with the weight loss benefit and would like to increase this medication if needed. He would prefer to be on a higher dose of Victoza if possible and lower metformin dose as he likes Victoza more and cost is not a concern as he has good insurance coverage.  SMBG: once every other week, inconsistent lately. Ranges (patient reported): sometimes over 150, but usually 140's fastin AM, post eating are less than 160 mg/dL usually - he thinks his numbers have been increasing over the past few months  Symptoms of low blood sugar? none. Frequency of hypoglycemia? never.  Recent symptoms of high blood sugar? None.   Eye exam: due  Foot exam: UTD  Microalbumin is not < 30 mg/g. Pt is taking an ACEi/ARB.  Aspirin: Not taking due to patient age less than 50  Diet/Exercise: He has gained some weight and wants to work on diet. Walks 4 times per week in the summers but now that it is winter, it has been difficult for him to walk. He's trying to to eat less carbs and eat more salads.  Lab Results   Component Value Date    A1C 5.6 06/25/2018    A1C 5.1 11/02/2017    A1C 5.2 05/24/2017    A1C 5.9 12/26/2016    A1C 8.1 09/22/2016     Asthma:  Current asthma medications: Fluticasone+Salmeterol (Advair) 250-50 mcg BID. Also has Ventolin PRN (has not used recently, especially as he has not been exercising). He feels breathing has  been really good lately. Pt rinses mouth after using steroid inhaler. Asthma triggers include: upper respiratory infections and exercise or sports.   Pt reports the following symptoms: none.   AAP on file: YES  PIF was completed today: Yes. PIF rate is 50 L/min with medium-low resistance    ACT Total Scores 11/2/2017 6/25/2018 2/21/2019   ACT TOTAL SCORE (Goal Greater than or Equal to 20) 23 21 24   In the past 12 months, how many times did you visit the emergency room for your asthma without being admitted to the hospital? 0 0 0   In the past 12 months, how many times were you hospitalized overnight because of your asthma? 0 0 0     Hypertension: Current medications include lisinopril 10 mg daily.  Patient does not self-monitor BP.  Patient reports no current medication side effects. He has not taken his BP medications today yet.  BP Readings from Last 3 Encounters:   02/21/19 124/88   06/28/18 115/78   06/25/18 132/84     Hyperlipidemia: Current therapy includes simvastatin 40 mg once daily at dinner. Reports no side effects.  The 10-year ASCVD risk score (Rosy KATT Jr., et al., 2013) is: 8.4%    Values used to calculate the score:      Age: 43 years      Sex: Male      Is Non- : Yes      Diabetic: Yes      Tobacco smoker: No      Systolic Blood Pressure: 115 mmHg      Is BP treated: Yes      HDL Cholesterol: 54 mg/dL      Total Cholesterol: 152 mg/dL    Increased Urination: He complains of having to urinate every 2 hours and this has been happening for years. He doesn't think it's diabetes related but knows some of it might be more recently d/t high sugars recently. He reports that he saw a urologist few years ago outside of the [facility] system and they did not find anything. He feels that he has to urinate less frequently when he eats lots of leafy vegetables. He would be interested in a referral to urology again once his diabetes stablizes.    Today's Vitals: /88   Wt 243 lb 3.2 oz (110.3  kg)   BMI 34.90 kg/m        ASSESSMENT:              Current medications were reviewed today as discussed above.      Medication Adherence: no issues identified    Diabetes: Needs Improvement. Self monitoring of blood glucose is not at goal of fasting  mg/dL. Updated A1c due. If A1c returns >7%, pt would benefit from increased dose of Victoza. He would also benefit from further dose increase of Victoza eventually to max dose and potential dose decrease of metformin if needed as Victoza is preferred for further weight loss benefit. Additionally, pt unaware that he could take metformin once daily and would prefer to move timing to once daily.    Asthma: Stable. Up to date and at goal of ACT > or equal to 20.    Hypertension: Stable. Patient is meeting BP goal of < 140/90mmHg.     Hyperlipidemia: Stable    Increase Urination: Will place urology referral in future once diabetes more stable.    PLAN:                  1. Pt to move metformin to dinner time for convenience of taking all medications once daily.  2. A1c today.    A1c returned at 7.8% - increase Victoza to 1.2mg daily.    I spent 30 minutes with this patient today. All changes were made via collaborative practice agreement with Krista Juares. A copy of the visit note was provided to the patient's primary care provider.     [facility].    The patient was given a summary of these recommendations as an after visit summary.    Radha Amaral, Hans  Medication Therapy Management Resident, [facility]  Pager: [phone]    Preceptor cosignature: Jeanmarie Mckinley was seen independently by Dr. Amaral. I have reviewed the assessment and plan. Faith Montague, AndersD, VITO, BCACP

## 2019-02-22 ASSESSMENT — ASTHMA QUESTIONNAIRES: ACT_TOTALSCORE: 24

## 2019-03-02 DIAGNOSIS — E11.9 CONTROLLED TYPE 2 DIABETES MELLITUS WITHOUT COMPLICATION, WITHOUT LONG-TERM CURRENT USE OF INSULIN (H): ICD-10-CM

## 2019-03-04 RX ORDER — PEN NEEDLE, DIABETIC 32GX 5/32"
NEEDLE, DISPOSABLE MISCELLANEOUS
Qty: 100 EACH | Refills: 0 | Status: SHIPPED | OUTPATIENT
Start: 2019-03-04 | End: 2020-05-05

## 2019-03-04 NOTE — TELEPHONE ENCOUNTER
"Prescription approved per [facility] Refill Protocol.  Alison Wei RN    Requested Prescriptions   Signed Prescriptions Disp Refills     BD VICENTE U/F 32G X 4 MM insulin pen needle 100 each 0     Sig: USE ONCE DAILY AS DIRECTED.    Diabetic Supplies Protocol Passed - 3/2/2019  3:10 PM       Passed - Medication is active on med list       Passed - Patient is 18 years of age or older       Passed - Recent (6 mo) or future (30 days) visit within the authorizing provider's specialty    Patient had office visit in the last 6 months or has a visit in the next 30 days with authorizing provider.  See \"Patient Info\" tab in inbasket, or \"Choose Columns\" in Meds & Orders section of the refill encounter.              "

## 2019-07-15 ENCOUNTER — OFFICE VISIT (OUTPATIENT)
Dept: FAMILY MEDICINE | Facility: CLINIC | Age: 44
End: 2019-07-15
Payer: COMMERCIAL

## 2019-07-15 VITALS
HEIGHT: 70 IN | TEMPERATURE: 97.9 F | WEIGHT: 236.7 LBS | DIASTOLIC BLOOD PRESSURE: 82 MMHG | OXYGEN SATURATION: 99 % | SYSTOLIC BLOOD PRESSURE: 124 MMHG | HEART RATE: 69 BPM | BODY MASS INDEX: 33.89 KG/M2

## 2019-07-15 DIAGNOSIS — J45.998 ASTHMA, PERSISTENT CONTROLLED: ICD-10-CM

## 2019-07-15 DIAGNOSIS — I10 BENIGN ESSENTIAL HYPERTENSION: ICD-10-CM

## 2019-07-15 DIAGNOSIS — E87.1 HYPONATREMIA: ICD-10-CM

## 2019-07-15 DIAGNOSIS — E11.65 TYPE 2 DIABETES MELLITUS WITH HYPERGLYCEMIA, WITHOUT LONG-TERM CURRENT USE OF INSULIN (H): Primary | ICD-10-CM

## 2019-07-15 DIAGNOSIS — E78.2 MIXED HYPERLIPIDEMIA: ICD-10-CM

## 2019-07-15 DIAGNOSIS — L20.82 FLEXURAL ECZEMA: ICD-10-CM

## 2019-07-15 LAB — HBA1C MFR BLD: 6.1 % (ref 0–5.6)

## 2019-07-15 PROCEDURE — 36415 COLL VENOUS BLD VENIPUNCTURE: CPT | Performed by: FAMILY MEDICINE

## 2019-07-15 PROCEDURE — 99214 OFFICE O/P EST MOD 30 MIN: CPT | Performed by: FAMILY MEDICINE

## 2019-07-15 PROCEDURE — 83036 HEMOGLOBIN GLYCOSYLATED A1C: CPT | Performed by: FAMILY MEDICINE

## 2019-07-15 PROCEDURE — 80061 LIPID PANEL: CPT | Performed by: FAMILY MEDICINE

## 2019-07-15 PROCEDURE — 82043 UR ALBUMIN QUANTITATIVE: CPT | Performed by: FAMILY MEDICINE

## 2019-07-15 PROCEDURE — 80053 COMPREHEN METABOLIC PANEL: CPT | Performed by: FAMILY MEDICINE

## 2019-07-15 RX ORDER — TRIAMCINOLONE ACETONIDE 1 MG/G
CREAM TOPICAL 2 TIMES DAILY PRN
Qty: 80 G | Refills: 0 | Status: SHIPPED | OUTPATIENT
Start: 2019-07-15 | End: 2021-04-08

## 2019-07-15 RX ORDER — FLUTICASONE PROPIONATE AND SALMETEROL XINAFOATE 115; 21 UG/1; UG/1
2 AEROSOL, METERED RESPIRATORY (INHALATION) DAILY
Qty: 1 INHALER | Refills: 11 | Status: SHIPPED | OUTPATIENT
Start: 2019-07-15 | End: 2019-10-10

## 2019-07-15 RX ORDER — LIRAGLUTIDE 6 MG/ML
INJECTION SUBCUTANEOUS
Qty: 18 ML | Refills: 3 | Status: SHIPPED | OUTPATIENT
Start: 2019-07-15 | End: 2019-10-10

## 2019-07-15 RX ORDER — METFORMIN HCL 500 MG
2000 TABLET, EXTENDED RELEASE 24 HR ORAL
Qty: 360 TABLET | Refills: 3 | Status: SHIPPED | OUTPATIENT
Start: 2019-07-15 | End: 2019-07-19

## 2019-07-15 RX ORDER — LISINOPRIL 10 MG/1
TABLET ORAL
Qty: 90 TABLET | Refills: 3 | Status: SHIPPED | OUTPATIENT
Start: 2019-07-15 | End: 2020-08-19

## 2019-07-15 RX ORDER — ALBUTEROL SULFATE 90 UG/1
1-2 AEROSOL, METERED RESPIRATORY (INHALATION) EVERY 6 HOURS PRN
Qty: 1 INHALER | Refills: 11 | Status: SHIPPED | OUTPATIENT
Start: 2019-07-15 | End: 2020-08-19

## 2019-07-15 RX ORDER — SIMVASTATIN 40 MG
TABLET ORAL
Qty: 90 TABLET | Refills: 3 | Status: SHIPPED | OUTPATIENT
Start: 2019-07-15 | End: 2020-08-19

## 2019-07-15 ASSESSMENT — MIFFLIN-ST. JEOR: SCORE: 1974.91

## 2019-07-15 NOTE — PROGRESS NOTES
Subjective     Jeanmarie Mckinley is a 43 year old male who presents to clinic today for the following health issues:    HPI   Diabetes Follow-up      How often are you checking your blood sugar? A few times a month    What time of day are you checking your blood sugars (select all that apply)?  Before and after meals    Have you had any blood sugars above 200?  No    Have you had any blood sugars below 70?  No    What symptoms do you notice when your blood sugar is low?  None    What concerns do you have today about your diabetes? None     Do you have any of these symptoms? (Select all that apply)  No numbness or tingling in feet.  No redness, sores or blisters on feet.  No complaints of excessive thirst.  No reports of blurry vision.  No significant changes to weight.     Have you had a diabetic eye exam in the last 12 months? No aware due, goes to [facility] due -will get it done    No side effects from  Medications    victoza before noon meal    glucohage 2000 mg at evening meal    BP Readings from Last 2 Encounters:   07/15/19 124/82   02/21/19 124/88     Hemoglobin A1C (%)   Date Value   07/15/2019 6.1 (H)   02/21/2019 7.8 (H)     LDL Cholesterol Calculated (mg/dL)   Date Value   07/15/2019 65   06/25/2018 79     Wt Readings from Last 5 Encounters:   07/15/19 107.4 kg (236 lb 11.2 oz)   02/21/19 110.3 kg (243 lb 3.2 oz)   06/28/18 108.6 kg (239 lb 8 oz)   06/25/18 108 kg (238 lb)   11/02/17 105.2 kg (232 lb)     Diabetes Management Resources    Amount of exercise or physical activity: 3-4 x week    Problems taking medications regularly: No    Medication side effects: none    Diet: regular (no restrictions)      PROBLEMS TO ADD ON...noted itchy rash near lower arms and back of the legs     BP Readings from Last 3 Encounters:   07/15/19 124/82   02/21/19 124/88   06/28/18 115/78    Wt Readings from Last 3 Encounters:   07/15/19 107.4 kg (236 lb 11.2 oz)   02/21/19 110.3 kg (243 lb 3.2 oz)   06/28/18 108.6 kg (239  "lb 8 oz)                    PROBLEMS TO ADD ON...  Reviewed and updated as needed this visit by Provider         Review of Systems   ROS COMP: Constitutional, HEENT, cardiovascular, pulmonary, GI, , musculoskeletal, neuro, skin, endocrine and psych systems are negative, except as otherwise noted.      Objective    /82   Pulse 69   Temp 97.9  F (36.6  C) (Oral)   Ht 1.778 m (5' 10\")   Wt 107.4 kg (236 lb 11.2 oz)   SpO2 99%   BMI 33.96 kg/m    Body mass index is 33.96 kg/m .  Physical Exam   GENERAL: healthy, alert and no distress  NECK: no adenopathy, no asymmetry, masses, or scars and thyroid normal to palpation  RESP: lungs clear to auscultation - no rales, rhonchi or wheezes  CV: regular rate and rhythm, normal S1 S2, no S3 or S4, no murmur, click or rub, no peripheral edema and peripheral pulses strong  ABDOMEN: soft, nontender, no hepatosplenomegaly, no masses and bowel sounds normal  MS: no gross musculoskeletal defects noted, no edema  Diabetic foot exam: normal DP and PT pulses, no trophic changes or ulcerative lesions and normal sensory exam    Diagnostic Test Results:  Labs reviewed in Epic        Assessment & Plan     1. Type 2 diabetes mellitus with hyperglycemia, without long-term current use of insulin (H)- well controlled   Lab Results   Component Value Date    A1C 6.1 07/15/2019    A1C 7.8 02/21/2019    A1C 5.6 06/25/2018    A1C 5.1 11/02/2017    A1C 5.2 05/24/2017       Started on victoza since 2016    - Hemoglobin A1c  - Comprehensive metabolic panel  - liraglutide (VICTOZA PEN) 18 MG/3ML solution; INJECT 1.2 MG SUBCUTANEOUS DAILY  Dispense: 18 mL; Refill: 3  - metFORMIN (GLUCOPHAGE-XR) 500 MG 24 hr tablet; Take 4 tablets (2,000 mg) by mouth daily (with dinner) TAKE 2 TABLETS (1,000 MG) BY MOUTH 2 TIMES DAILY (WITH MEALS)  Dispense: 360 tablet; Refill: 3  - Albumin Random Urine Quantitative with Creat Ratio  -eye exam soon within 4 weeks    2. Mixed hyperlipidemia  - Lipid panel " "reflex to direct LDL Fasting  - simvastatin (ZOCOR) 40 MG tablet; TAKE 1 TABLET (40 MG) BY MOUTH AT BEDTIME  Dispense: 90 tablet; Refill: 3  Recent Labs   Lab Test 07/15/19  1319 06/25/18  0856   CHOL 113 152   HDL 34* 54   LDL 65 79   TRIG 68 97       3. Benign essential hypertension  Controlled    - lisinopril (PRINIVIL/ZESTRIL) 10 MG tablet; TAKE 1 TABLET BY MOUTH DAILY (EVERY 24 HOURS).  Dispense: 90 tablet; Refill: 3  - Comprehensive metabolic panel  4. Asthma, persistent controlled  Controlled    - fluticasone-salmeterol (ADVAIR-HFA) 115-21 MCG/ACT inhaler; Inhale 2 puffs into the lungs daily  Dispense: 1 Inhaler; Refill: 11  - albuterol (VENTOLIN HFA) 108 (90 Base) MCG/ACT inhaler; Inhale 1-2 puffs into the lungs every 6 hours as needed for shortness of breath / dyspnea or wheezing  Dispense: 1 Inhaler; Refill: 11    5. Flexural eczema    - triamcinolone (KENALOG) 0.1 % external cream; Apply topically 2 times daily as needed for irritation (eczema)  Dispense: 80 g; Refill: 0    6. BMI 34.0-34.9,adult         BMI:   Estimated body mass index is 33.96 kg/m  as calculated from the following:    Height as of this encounter: 1.778 m (5' 10\").    Weight as of this encounter: 107.4 kg (236 lb 11.2 oz).   Weight management plan: Discussed healthy diet and exercise guidelines          Return in about 6 months (around 1/15/2020) for DM.    Krista Juares MD  [facility]        "

## 2019-07-15 NOTE — RESULT ENCOUNTER NOTE
Fantastic job  Keep up with healthy diet, low carb.  A1C is looking great.  No medications changes as long as no very high > 140   or low Blood Sugar  < 60 & with symptoms     Please keep us posted with questions or concerns .      Best Regards,    Krista Juares MD  [facility]  [phone]

## 2019-07-15 NOTE — NURSING NOTE
"Chief Complaint   Patient presents with     Diabetes     /82   Pulse 69   Temp 97.9  F (36.6  C) (Oral)   Ht 1.778 m (5' 10\")   Wt 107.4 kg (236 lb 11.2 oz)   SpO2 99%   BMI 33.96 kg/m   Estimated body mass index is 33.96 kg/m  as calculated from the following:    Height as of this encounter: 1.778 m (5' 10\").    Weight as of this encounter: 107.4 kg (236 lb 11.2 oz).        Health Maintenance due pending provider review:  NONE    n/a    Ellie Marquez CMA  "

## 2019-07-16 LAB
ALBUMIN SERPL-MCNC: 3.3 G/DL (ref 3.4–5)
ALP SERPL-CCNC: 56 U/L (ref 40–150)
ALT SERPL W P-5'-P-CCNC: 23 U/L (ref 0–70)
ANION GAP SERPL CALCULATED.3IONS-SCNC: 7 MMOL/L (ref 3–14)
AST SERPL W P-5'-P-CCNC: 17 U/L (ref 0–45)
BILIRUB SERPL-MCNC: 2.1 MG/DL (ref 0.2–1.3)
BUN SERPL-MCNC: 12 MG/DL (ref 7–30)
CALCIUM SERPL-MCNC: 7.3 MG/DL (ref 8.5–10.1)
CHLORIDE SERPL-SCNC: 99 MMOL/L (ref 94–109)
CHOLEST SERPL-MCNC: 113 MG/DL
CO2 SERPL-SCNC: 21 MMOL/L (ref 20–32)
CREAT SERPL-MCNC: 0.91 MG/DL (ref 0.66–1.25)
CREAT UR-MCNC: 72 MG/DL
GFR SERPL CREATININE-BSD FRML MDRD: >90 ML/MIN/{1.73_M2}
GLUCOSE SERPL-MCNC: 107 MG/DL (ref 70–99)
HDLC SERPL-MCNC: 34 MG/DL
LDLC SERPL CALC-MCNC: 65 MG/DL
MICROALBUMIN UR-MCNC: 21 MG/L
MICROALBUMIN/CREAT UR: 29.43 MG/G CR (ref 0–17)
NONHDLC SERPL-MCNC: 79 MG/DL
POTASSIUM SERPL-SCNC: 4.3 MMOL/L (ref 3.4–5.3)
PROT SERPL-MCNC: 5.7 G/DL (ref 6.8–8.8)
SODIUM SERPL-SCNC: 127 MMOL/L (ref 133–144)
T4 FREE SERPL-MCNC: 0.97 NG/DL (ref 0.76–1.46)
TRIGL SERPL-MCNC: 68 MG/DL
TSH SERPL DL<=0.005 MIU/L-ACNC: 0.35 MU/L (ref 0.4–4)

## 2019-07-16 PROCEDURE — 36415 COLL VENOUS BLD VENIPUNCTURE: CPT | Performed by: FAMILY MEDICINE

## 2019-07-16 PROCEDURE — 84439 ASSAY OF FREE THYROXINE: CPT | Performed by: FAMILY MEDICINE

## 2019-07-16 PROCEDURE — 84443 ASSAY THYROID STIM HORMONE: CPT | Performed by: FAMILY MEDICINE

## 2019-07-17 NOTE — RESULT ENCOUNTER NOTE
-Cholesterol levels are at your goal levels.  The good cholesterol HDL is low.   ADVISE: keep up the good work of watching diet carefully,  continuing your medication, a regular exercise program with at least 150 minutes of aerobic exercise per week, and eating a low saturated fat/low carbohydrate diet.    Also, next fasting lipid recheck  in 12 months.  -Kidney function (GFR) is normal.  -Sodium is decreased. 127. It could be from excess use of water  ADVISE: recheck in 1-2 weeks.lab only appointment ok   -Potassium is normal.  -Calcium is decreased.  ADVISE: getting more calcium in your diet and then rechecking this periodically   -liver functions otherwise  is normal. Slightly low protein   -A1C (test of diabetes control the last 2-3 months) is at your goal. Please continue with your current plan. Also, you should make an appointment to see me and recheck your A1C test in 6 months.   -TSH (thyroid stimulating hormone) level indicates hyperthyroidism (an overactive thyroid).  This can cause a number of symptoms including palpitations, anxiety, agitation, tremor, insomnia, diarrhea, heat intolerance.  ADVISE: getting additional lab work and a special scan of your thyroid done to help determine what is causing this condition. Please make a lab appointment  To check your thyroid stimulating immunoglobulin and someone from imaging will call you to set up the scan.  Then, you should have a follow-up appointment in clinic after the tests are done to discuss the next steps in your evaluation and treatment.  -Microalbumin (urine protein) level is elevated. This is suggestive of early damage to your kidneys from high blood pressure and diabetes.  ADVISE: avoiding anti-inflamatory agents such as ibuprofen (Advil, Motrin) or naproxen (Aleve) as much as possible, keeping your blood pressure in a normal range, and continuing your medication (lisinopril) that helps protect your kidneys.  Also, this should be rechecked in 1  year.

## 2019-07-18 DIAGNOSIS — E11.65 TYPE 2 DIABETES MELLITUS WITH HYPERGLYCEMIA, WITHOUT LONG-TERM CURRENT USE OF INSULIN (H): ICD-10-CM

## 2019-07-18 RX ORDER — METFORMIN HCL 500 MG
1000 TABLET, EXTENDED RELEASE 24 HR ORAL 2 TIMES DAILY WITH MEALS
Qty: 360 TABLET | Refills: 3 | Status: CANCELLED | OUTPATIENT
Start: 2019-07-18

## 2019-07-19 ENCOUNTER — TELEPHONE (OUTPATIENT)
Dept: FAMILY MEDICINE | Facility: CLINIC | Age: 44
End: 2019-07-19

## 2019-07-19 DIAGNOSIS — E11.65 TYPE 2 DIABETES MELLITUS WITH HYPERGLYCEMIA, WITHOUT LONG-TERM CURRENT USE OF INSULIN (H): ICD-10-CM

## 2019-07-19 RX ORDER — METFORMIN HCL 500 MG
2000 TABLET, EXTENDED RELEASE 24 HR ORAL
Qty: 360 TABLET | Refills: 3 | Status: SHIPPED | OUTPATIENT
Start: 2019-07-19 | End: 2019-10-10

## 2019-07-19 NOTE — TELEPHONE ENCOUNTER
1. Type 2 diabetes mellitus with hyperglycemia, without long-term current use of insulin (H)    - metFORMIN (GLUCOPHAGE-XR) 500 MG 24 hr tablet; Take 4 tablets (2,000 mg) by mouth daily (with dinner)  Dispense: 360 tablet; Refill: 3    He can take all once daily   Or divide the dose twice daily    I believe he told he he has been taking it all once daily.

## 2019-07-19 NOTE — TELEPHONE ENCOUNTER
As,  Please clarify metformin sig as two different sigs listed.    Also, confirming you want him to take advair 115-42.    Please advise.  Thanks,  Jaja Lizarraga RN

## 2019-07-19 NOTE — TELEPHONE ENCOUNTER
Reason for Call:  Medication or medication question:    Name of the medication requested:   fluticasone-salmeterol (ADVAIR-HFA) 115-21 MCG/ACT inhaler    metFORMIN (GLUCOPHAGE-XR) 500 MG 24 hr tablet    Other request: Pharmacy is just calling to verify the strength of thi ADVAIR as they have two kinds. They are also calling to verify the directions of the metformin.     Can we leave a detailed message on this number? NO    Phone number Contact can be reached at: Other phone number:  [phone]    Best Time: Any    Call taken on 7/19/2019 at 10:16 AM by Alesia Burris

## 2019-07-25 NOTE — TELEPHONE ENCOUNTER
Metformin was confirmed before.    - metFORMIN (GLUCOPHAGE-XR) 500 MG 24 hr tablet; Take 4 tablets (2,000 mg) by mouth daily (with dinner)  OR  TAKE 2 TABLETS (1,000 MG) BY MOUTH 2 TIMES DAILY (WITH MEALS)      And   - fluticasone-salmeterol (ADVAIR-HFA) 115-21 MCG/ACT inhaler; Inhale 2 puffs into the lungs daily  Dispense:     I have not prescribed another dose.  Let me know what needs clarification please.  Thanks

## 2019-07-26 NOTE — TELEPHONE ENCOUNTER
As,  Thank you for clarifying the metformin.    Advair dose discontinued on 7/15/19 was ADVAIR DISKUS 250-50 MCG/DOSE inhaler.   Pharm calling confirming you want the (ADVAIR-HFA) 115-21 MCG/ACT inhaler Inhaler ordered on 7/15/19.    Please advise.  Thanks,  Jaja Lizarraga RN

## 2019-07-26 NOTE — TELEPHONE ENCOUNTER
Asthma is controlled.  That's why the steroid dose is decreased.  If higher copay- let me know.  Not sure what pharmacy needs.  Thanks

## 2019-08-04 DIAGNOSIS — J45.998 ASTHMA, PERSISTENT CONTROLLED: Primary | ICD-10-CM

## 2019-08-04 NOTE — TELEPHONE ENCOUNTER
"Advair Diskus 250-50MCG  Last Written Prescription Date:  07/15/2019  Last Fill Quantity: 1,  # refills: 11   Last office visit: 7/15/2019 with prescribing provider:  Yes    Future Office Visit:    Requested Prescriptions   Pending Prescriptions Disp Refills     ADVAIR DISKUS 250-50 MCG/DOSE inhaler [Pharmacy Med Name: ADVAIR 250-50 DISKUS]  1     Sig: INHALE 1 PUFF INTO THE LUNGS TWICE DAILY       Inhaled Steroids Protocol Passed - 8/4/2019  9:34 AM        Passed - Patient is age 12 or older        Passed - Asthma control assessment score within normal limits in last 6 months     Please review ACT score.           Passed - Medication is active on med list        Passed - Recent (6 mo) or future (30 days) visit within the authorizing provider's specialty     Patient had office visit in the last 6 months or has a visit in the next 30 days with authorizing provider or within the authorizing provider's specialty.  See \"Patient Info\" tab in inbasket, or \"Choose Columns\" in Meds & Orders section of the refill encounter.              "

## 2019-08-05 NOTE — TELEPHONE ENCOUNTER
A.S,   Please advise on [email or web address]  Pharmacy requesting 250/50 BID Rx  That was pt's previous dose  New Rx sent at 7/15/2019 appt for 115/21 QD   No mention of need to change - noted asthma controlled  Please advise dose/directions you want pt on   Thanks,  Fatemeh OSORIO RN

## 2019-08-14 DIAGNOSIS — E87.1 HYPONATREMIA: ICD-10-CM

## 2019-08-14 LAB
ANION GAP SERPL CALCULATED.3IONS-SCNC: 7 MMOL/L (ref 3–14)
BUN SERPL-MCNC: 14 MG/DL (ref 7–30)
CALCIUM SERPL-MCNC: 9.2 MG/DL (ref 8.5–10.1)
CHLORIDE SERPL-SCNC: 110 MMOL/L (ref 94–109)
CO2 SERPL-SCNC: 25 MMOL/L (ref 20–32)
CREAT SERPL-MCNC: 1 MG/DL (ref 0.66–1.25)
GFR SERPL CREATININE-BSD FRML MDRD: >90 ML/MIN/{1.73_M2}
GLUCOSE SERPL-MCNC: 149 MG/DL (ref 70–99)
POTASSIUM SERPL-SCNC: 4.4 MMOL/L (ref 3.4–5.3)
SODIUM SERPL-SCNC: 142 MMOL/L (ref 133–144)

## 2019-08-14 PROCEDURE — 80048 BASIC METABOLIC PNL TOTAL CA: CPT | Performed by: FAMILY MEDICINE

## 2019-08-14 PROCEDURE — 36415 COLL VENOUS BLD VENIPUNCTURE: CPT | Performed by: FAMILY MEDICINE

## 2019-08-14 NOTE — RESULT ENCOUNTER NOTE
Hello  The sodium is back to normal level that's great.  Glucose slightly high but it seems that your overall Diabetes  Control is good, based on recent A1C  Kidney functions otherwise normal     Please keep us posted with questions or concerns .      Best Regards,    Krista Juares MD  [facility]  [phone]

## 2019-08-23 ENCOUNTER — MYC MEDICAL ADVICE (OUTPATIENT)
Dept: FAMILY MEDICINE | Facility: CLINIC | Age: 44
End: 2019-08-23

## 2019-08-23 DIAGNOSIS — I10 BENIGN ESSENTIAL HYPERTENSION: ICD-10-CM

## 2019-08-23 DIAGNOSIS — E78.2 MIXED HYPERLIPIDEMIA: ICD-10-CM

## 2019-08-23 NOTE — TELEPHONE ENCOUNTER
See MyChart to pt   [facility]  [facility]   Unsure if pt needs temporary supply to local?  Fatemeh OSORIO RN

## 2019-08-23 NOTE — TELEPHONE ENCOUNTER
"LISINOPRIL 10 MG TABLET  Last Written Prescription Date:  07/15/219  Last Fill Quantity: 90,  # refills: 3   Last office visit: 7/15/2019 with prescribing provider:  AS   Future Office Visit:  Nothing at this time scheduled.      SIMVASTATIN 40 MG TABLET  Last Written Prescription Date:  07/15/209  Last Fill Quantity: 90,  # refills: 3   Last office visit: 7/15/2019 with prescribing provider:  AS   Future Office Visit:  Nothing at this time scheduled.    Requested Prescriptions   Pending Prescriptions Disp Refills     lisinopril (PRINIVIL/ZESTRIL) 10 MG tablet [Pharmacy Med Name: LISINOPRIL 10 MG TABLET] 90 tablet 0     Sig: TAKE 1 TABLET BY MOUTH EVERY DAY       ACE Inhibitors (Including Combos) Protocol Passed - 8/23/2019  1:37 AM        Passed - Blood pressure under 140/90 in past 12 months     BP Readings from Last 3 Encounters:   07/15/19 124/82   02/21/19 124/88   06/28/18 115/78                 Passed - Recent (12 mo) or future (30 days) visit within the authorizing provider's specialty     Patient had office visit in the last 12 months or has a visit in the next 30 days with authorizing provider or within the authorizing provider's specialty.  See \"Patient Info\" tab in inbasket, or \"Choose Columns\" in Meds & Orders section of the refill encounter.              Passed - Medication is active on med list        Passed - Patient is age 18 or older        Passed - Normal serum creatinine on file in past 12 months     Recent Labs   Lab Test 08/14/19  0735   CR 1.00             Passed - Normal serum potassium on file in past 12 months     Recent Labs   Lab Test 08/14/19  0735   POTASSIUM 4.4             simvastatin (ZOCOR) 40 MG tablet [Pharmacy Med Name: SIMVASTATIN 40 MG TABLET] 90 tablet 0     Sig: TAKE 1 TABLET (40 MG) BY MOUTH AT BEDTIME       Statins Protocol Passed - 8/23/2019  1:37 AM        Passed - LDL on file in past 12 months     Recent Labs   Lab Test 07/15/19  1319   LDL 65             Passed - No " "abnormal creatine kinase in past 12 months     No lab results found.             Passed - Recent (12 mo) or future (30 days) visit within the authorizing provider's specialty     Patient had office visit in the last 12 months or has a visit in the next 30 days with authorizing provider or within the authorizing provider's specialty.  See \"Patient Info\" tab in inbasket, or \"Choose Columns\" in Meds & Orders section of the refill encounter.              Passed - Medication is active on med list        Passed - Patient is age 18 or older        "

## 2019-08-26 RX ORDER — LISINOPRIL 10 MG/1
TABLET ORAL
Start: 2019-08-26

## 2019-08-26 RX ORDER — SIMVASTATIN 40 MG
TABLET ORAL
Start: 2019-08-26

## 2019-08-26 NOTE — TELEPHONE ENCOUNTER
No need for more I assume they have it on automatic refill, I have plenty at house  ----- Message -----  From: Fatemeh OSORIO  Sent: 8/23/2019  1:55 PM CDT  To: Jeanmarie Mckinley  Subject: Rx's  Hi Jeanmarie,     [facility] and Simvastatin [facility]  These were sent to the mail order 7/15/2019 for a year  Are you out of medication and need a short term supply [facility]?    Fatemeh OSORIO RN    [facility].  Alison Wei RN

## 2019-09-05 DIAGNOSIS — J45.998 ASTHMA, PERSISTENT CONTROLLED: ICD-10-CM

## 2019-09-05 NOTE — TELEPHONE ENCOUNTER
Denied  Too early; Rx sent 8/5/2019 for 2 months    Reviewed Reconcile Dispensed tab in EPIC  Pt only filled 1x so far - 8/12/2019    Fatemeh OSORIO RN

## 2019-09-05 NOTE — TELEPHONE ENCOUNTER
"Advair Diskus 250-50MCG  Last Written Prescription Date:  08/05/2019  Last Fill Quantity: 1,  # refills: 1   Last office visit: 7/15/2019 with prescribing provider:  Yes    Future Office Visit:      Requested Prescriptions   Pending Prescriptions Disp Refills     ADVAIR DISKUS 250-50 MCG/DOSE inhaler [Pharmacy Med Name: ADVAIR 250-50 DISKUS]  1     Sig: INHALE 1 PUFF INTO THE LUNGS TWICE DAILY       Inhaled Steroids Protocol Failed - 9/5/2019  9:37 AM        Failed - Asthma control assessment score within normal limits in last 6 months     Please review ACT score.           Passed - Patient is age 12 or older        Passed - Medication is active on med list        Passed - Recent (6 mo) or future (30 days) visit within the authorizing provider's specialty     Patient had office visit in the last 6 months or has a visit in the next 30 days with authorizing provider or within the authorizing provider's specialty.  See \"Patient Info\" tab in inbasket, or \"Choose Columns\" in Meds & Orders section of the refill encounter.              "

## 2019-09-09 DIAGNOSIS — E11.65 TYPE 2 DIABETES MELLITUS WITH HYPERGLYCEMIA, WITHOUT LONG-TERM CURRENT USE OF INSULIN (H): ICD-10-CM

## 2019-09-09 RX ORDER — LIRAGLUTIDE 6 MG/ML
INJECTION SUBCUTANEOUS
Start: 2019-09-09

## 2019-09-09 NOTE — TELEPHONE ENCOUNTER
"Too soon.  Rx sent 7/15/19 for 18 ml with 3 refills.  Alison Wei, RN    Requested Prescriptions   Pending Prescriptions Disp Refills     liraglutide (VICTOZA PEN) 18 MG/3ML solution [Pharmacy Med Name: VICTOZA 2-STEPHANIE 18 MG/3 ML PEN]  5     Sig: INJECT 1.2 MG SUBCUTANEOUS DAILY       GLP-1 Agonists Protocol Passed - 9/9/2019  1:44 AM        Passed - Blood pressure less than 140/90 in past 6 months     BP Readings from Last 3 Encounters:   07/15/19 124/82   02/21/19 124/88   06/28/18 115/78                 Passed - LDL on file in past 12 months     Recent Labs   Lab Test 07/15/19  1319   LDL 65             Passed - Microalbumin on file in past 12 months     Recent Labs   Lab Test 07/15/19  1358   MICROL 21   UMALCR 29.43*             Passed - HgbA1C in past 3 or 6 months     If HgbA1C is 8 or greater, it needs to be on file within the past 3 months.  If less than 8, must be on file within the past 6 months.     Recent Labs   Lab Test 07/15/19  1319   A1C 6.1*             Passed - Medication is active on med list        Passed - Patient is age 18 or older        Passed - Normal serum creatinine on file in past 12 months     Recent Labs   Lab Test 08/14/19  0735   CR 1.00             Passed - Recent (6 mo) or future (30 days) visit within the authorizing provider's specialty     Patient had office visit in the last 6 months or has a visit in the next 30 days with authorizing provider.  See \"Patient Info\" tab in inbasket, or \"Choose Columns\" in Meds & Orders section of the refill encounter.            "

## 2019-09-10 DIAGNOSIS — E11.65 TYPE 2 DIABETES MELLITUS WITH HYPERGLYCEMIA, WITHOUT LONG-TERM CURRENT USE OF INSULIN (H): ICD-10-CM

## 2019-09-10 RX ORDER — METFORMIN HCL 500 MG
TABLET, EXTENDED RELEASE 24 HR ORAL
Refills: 0
Start: 2019-09-10

## 2019-09-10 NOTE — TELEPHONE ENCOUNTER
"Metformin 500MG  Last Written Prescription Date:  07/19/2019  Last Fill Quantity: 360,  # refills: 3   Last office visit: 7/15/2019 with prescribing provider:  Yes    Future Office Visit:      Requested Prescriptions   Pending Prescriptions Disp Refills     metFORMIN (GLUCOPHAGE-XR) 500 MG 24 hr tablet [Pharmacy Med Name: METFORMIN HCL  MG TABLET] 360 tablet 0     Sig: TAKE 2 TABLETS (1,000 MG) BY MOUTH 2 TIMES DAILY (WITH MEALS)       Biguanide Agents Passed - 9/10/2019  1:49 AM        Passed - Blood pressure less than 140/90 in past 6 months     BP Readings from Last 3 Encounters:   07/15/19 124/82   02/21/19 124/88   06/28/18 115/78                 Passed - Patient has documented LDL within the past 12 mos.     Recent Labs   Lab Test 07/15/19  1319   LDL 65             Passed - Patient has had a Microalbumin in the past 15 mos.     Recent Labs   Lab Test 07/15/19  1358   MICROL 21   UMALCR 29.43*             Passed - Patient is age 10 or older        Passed - Patient has documented A1c within the specified period of time.     If HgbA1C is 8 or greater, it needs to be on file within the past 3 months.  If less than 8, must be on file within the past 6 months.     Recent Labs   Lab Test 07/15/19  1319   A1C 6.1*             Passed - Patient's CR is NOT>1.4 OR Patient's EGFR is NOT<45 within past 12 mos.     Recent Labs   Lab Test 08/14/19  0735   GFRESTIMATED >90   GFRESTBLACK >90       Recent Labs   Lab Test 08/14/19  0735   CR 1.00             Passed - Patient does NOT have a diagnosis of CHF.        Passed - Medication is active on med list        Passed - Recent (6 mo) or future (30 days) visit within the authorizing provider's specialty     Patient had office visit in the last 6 months or has a visit in the next 30 days with authorizing provider or within the authorizing provider's specialty.  See \"Patient Info\" tab in inbasket, or \"Choose Columns\" in Meds & Orders section of the refill encounter.        "

## 2019-09-13 ENCOUNTER — MYC MEDICAL ADVICE (OUTPATIENT)
Dept: FAMILY MEDICINE | Facility: CLINIC | Age: 44
End: 2019-09-13

## 2019-09-13 DIAGNOSIS — E11.65 TYPE 2 DIABETES MELLITUS WITH HYPERGLYCEMIA, WITHOUT LONG-TERM CURRENT USE OF INSULIN (H): ICD-10-CM

## 2019-09-13 RX ORDER — LIRAGLUTIDE 6 MG/ML
1.2 INJECTION SUBCUTANEOUS DAILY
Qty: 6 ML | Refills: 0 | Status: SHIPPED | OUTPATIENT
Start: 2019-09-13 | End: 2020-04-23

## 2019-09-13 RX ORDER — LIRAGLUTIDE 6 MG/ML
INJECTION SUBCUTANEOUS
Start: 2019-09-13

## 2019-09-13 RX ORDER — METFORMIN HCL 500 MG
TABLET, EXTENDED RELEASE 24 HR ORAL
Start: 2019-09-13

## 2019-09-13 NOTE — TELEPHONE ENCOUNTER
Pt awaiting Rx's from mail order  Needs short term supplies  Prescription approved per [facility] Refill Protocol.  Fatemeh OSORIO RN

## 2019-09-13 NOTE — TELEPHONE ENCOUNTER
"[facility] requesting refills  Rx's sent to mail order July 2019 for both meds  Sent MyChart to pt to determine what pharmacy/if needs short term supply while awaiting mail order  Fatemeh OSORIO RN    Last Written Prescription Date:    Last Fill Quantity: ,  # refills:    Last office visit: 7/15/2019 with prescribing provider:     Future Office Visit:    Requested Prescriptions   Pending Prescriptions Disp Refills     liraglutide (VICTOZA PEN) 18 MG/3ML solution [Pharmacy Med Name: VICTOZA 2-STEPHANIE 18 MG/3 ML PEN]  5     Sig: INJECT 1.2 MG SUBCUTANEOUS DAILY       GLP-1 Agonists Protocol Passed - 9/13/2019  8:32 AM        Passed - Blood pressure less than 140/90 in past 6 months     BP Readings from Last 3 Encounters:   07/15/19 124/82   02/21/19 124/88   06/28/18 115/78                 Passed - LDL on file in past 12 months     Recent Labs   Lab Test 07/15/19  1319   LDL 65             Passed - Microalbumin on file in past 12 months     Recent Labs   Lab Test 07/15/19  1358   MICROL 21   UMALCR 29.43*             Passed - HgbA1C in past 3 or 6 months     If HgbA1C is 8 or greater, it needs to be on file within the past 3 months.  If less than 8, must be on file within the past 6 months.     Recent Labs   Lab Test 07/15/19  1319   A1C 6.1*             Passed - Medication is active on med list        Passed - Patient is age 18 or older        Passed - Normal serum creatinine on file in past 12 months     Recent Labs   Lab Test 08/14/19  0735   CR 1.00             Passed - Recent (6 mo) or future (30 days) visit within the authorizing provider's specialty     Patient had office visit in the last 6 months or has a visit in the next 30 days with authorizing provider.  See \"Patient Info\" tab in inbasket, or \"Choose Columns\" in Meds & Orders section of the refill encounter.            metFORMIN (GLUCOPHAGE-XR) 500 MG 24 hr tablet [Pharmacy Med Name: METFORMIN HCL  MG TABLET] 360 tablet 0     Sig: TAKE 2 TABLETS (1,000 MG) " "BY MOUTH 2 TIMES DAILY (WITH MEALS)       Biguanide Agents Passed - 9/13/2019  8:32 AM        Passed - Blood pressure less than 140/90 in past 6 months     BP Readings from Last 3 Encounters:   07/15/19 124/82   02/21/19 124/88   06/28/18 115/78                 Passed - Patient has documented LDL within the past 12 mos.     Recent Labs   Lab Test 07/15/19  1319   LDL 65             Passed - Patient has had a Microalbumin in the past 15 mos.     Recent Labs   Lab Test 07/15/19  1358   MICROL 21   UMALCR 29.43*             Passed - Patient is age 10 or older        Passed - Patient has documented A1c within the specified period of time.     If HgbA1C is 8 or greater, it needs to be on file within the past 3 months.  If less than 8, must be on file within the past 6 months.     Recent Labs   Lab Test 07/15/19  1319   A1C 6.1*             Passed - Patient's CR is NOT>1.4 OR Patient's EGFR is NOT<45 within past 12 mos.     Recent Labs   Lab Test 08/14/19  0735   GFRESTIMATED >90   GFRESTBLACK >90       Recent Labs   Lab Test 08/14/19  0735   CR 1.00             Passed - Patient does NOT have a diagnosis of CHF.        Passed - Medication is active on med list        Passed - Recent (6 mo) or future (30 days) visit within the authorizing provider's specialty     Patient had office visit in the last 6 months or has a visit in the next 30 days with authorizing provider or within the authorizing provider's specialty.  See \"Patient Info\" tab in inbasket, or \"Choose Columns\" in Meds & Orders section of the refill encounter.            "

## 2019-09-16 ENCOUNTER — OFFICE VISIT (OUTPATIENT)
Dept: OPHTHALMOLOGY | Facility: CLINIC | Age: 44
End: 2019-09-16
Attending: STUDENT IN AN ORGANIZED HEALTH CARE EDUCATION/TRAINING PROGRAM
Payer: COMMERCIAL

## 2019-09-16 DIAGNOSIS — E11.9 TYPE 2 DIABETES MELLITUS WITHOUT RETINOPATHY (H): Primary | ICD-10-CM

## 2019-09-16 DIAGNOSIS — H40.003 GLAUCOMA SUSPECT OF BOTH EYES: ICD-10-CM

## 2019-09-16 PROCEDURE — G0463 HOSPITAL OUTPT CLINIC VISIT: HCPCS | Mod: ZF

## 2019-09-16 ASSESSMENT — VISUAL ACUITY
OD_SC: 20/20
OS_SC: J1+
METHOD: SNELLEN - LINEAR
OD_SC+: -1
OS_SC: 20/20
OD_SC: J1+

## 2019-09-16 ASSESSMENT — CONF VISUAL FIELD
OS_NORMAL: 1
OD_NORMAL: 1
METHOD: COUNTING FINGERS

## 2019-09-16 ASSESSMENT — TONOMETRY
OS_IOP_MMHG: 20
OD_IOP_MMHG: 15
OD_IOP_MMHG: 20
IOP_METHOD: TONOPEN
IOP_METHOD: TONOPEN
OS_IOP_MMHG: 16

## 2019-09-16 ASSESSMENT — CUP TO DISC RATIO
OD_RATIO: 0.75
OS_RATIO: 0.7

## 2019-09-16 ASSESSMENT — EXTERNAL EXAM - RIGHT EYE: OD_EXAM: NORMAL

## 2019-09-16 ASSESSMENT — EXTERNAL EXAM - LEFT EYE: OS_EXAM: NORMAL

## 2019-09-16 ASSESSMENT — SLIT LAMP EXAM - LIDS
COMMENTS: NORMAL
COMMENTS: NORMAL

## 2019-09-16 NOTE — PROGRESS NOTES
HPI  Jeanmarie Mckinley is a 44 year old male here for diabetic eye exam. BOBBI was 12/2017. He denies any changes in vision. No eye pain/irritation. No flashes or floaters. No concerns. DM well-controlled, diagnosed 10 years ago, last A1C 6.1 7/15/19.     PMHx: T2DM x 10 years  POHx: Glaucoma suspect based on c/d  Ocular GTTS: None  Fam Hx: Uncle blind from DM    Assessment & Plan      (E11.9) Type 2 diabetes mellitus without retinopathy (H)  (primary encounter diagnosis)  Comment: Well-controlled. No retinopathy on exam today. Discussed importance of good BS/BP/lipid control per PCP.   Plan: Yearly dilated eye exam. Sooner PRN.     (H40.003) Glaucoma suspect of both eyes  Comment: Stable nerve appearance. Normal OCT RNFL 2016, normal (less reliable left eye) VF 2017. IOP wnl today.   Plan: Observe. Yearly eye exam.  -----------------------------------------------------------------------------------    Patient disposition:   Return in about 1 year (around 9/16/2020) for Annual Visit. or sooner as needed.    Adelaide Quintanilla MD  Ophthalmology Resident, PGY-3    Teaching statement:  Complete documentation of historical and exam elements from today's encounter can be found in the full encounter summary report (not reduplicated in this progress note). I personally obtained the chief complaint(s) and history of present illness.  I confirmed and edited as necessary the review of systems, past medical/surgical history, family history, social history, and examination findings as documented by others; and I examined the patient myself. I personally reviewed the relevant tests, images, and reports as documented above.     I formulated and edited as necessary the assessment and plan and discussed the findings and management plan with the patient and family.    Pratima Torres MD  Comprehensive Ophthalmology & Ocular Pathology  Department of Ophthalmology and Visual Neurosciences  [email or web address]  Pager [phone]

## 2019-10-10 ENCOUNTER — ALLIED HEALTH/NURSE VISIT (OUTPATIENT)
Dept: NURSING | Facility: CLINIC | Age: 44
End: 2019-10-10
Payer: COMMERCIAL

## 2019-10-10 ENCOUNTER — OFFICE VISIT (OUTPATIENT)
Dept: PHARMACY | Facility: CLINIC | Age: 44
End: 2019-10-10
Payer: COMMERCIAL

## 2019-10-10 VITALS — DIASTOLIC BLOOD PRESSURE: 62 MMHG | SYSTOLIC BLOOD PRESSURE: 122 MMHG | BODY MASS INDEX: 34.41 KG/M2 | WEIGHT: 239.8 LBS

## 2019-10-10 DIAGNOSIS — I10 BENIGN ESSENTIAL HYPERTENSION: ICD-10-CM

## 2019-10-10 DIAGNOSIS — Z23 NEED FOR PROPHYLACTIC VACCINATION AND INOCULATION AGAINST INFLUENZA: Primary | ICD-10-CM

## 2019-10-10 DIAGNOSIS — E11.9 CONTROLLED TYPE 2 DIABETES MELLITUS WITHOUT COMPLICATION, WITHOUT LONG-TERM CURRENT USE OF INSULIN (H): Primary | ICD-10-CM

## 2019-10-10 DIAGNOSIS — L30.9 ECZEMA, UNSPECIFIED TYPE: ICD-10-CM

## 2019-10-10 DIAGNOSIS — E78.2 MIXED HYPERLIPIDEMIA: ICD-10-CM

## 2019-10-10 DIAGNOSIS — J45.998 ASTHMA, PERSISTENT CONTROLLED: ICD-10-CM

## 2019-10-10 PROCEDURE — 99207 ZZC NO CHARGE NURSE ONLY: CPT

## 2019-10-10 PROCEDURE — 99606 MTMS BY PHARM EST 15 MIN: CPT | Performed by: PHARMACIST

## 2019-10-10 PROCEDURE — 90471 IMMUNIZATION ADMIN: CPT

## 2019-10-10 PROCEDURE — 99607 MTMS BY PHARM ADDL 15 MIN: CPT | Performed by: PHARMACIST

## 2019-10-10 PROCEDURE — 90686 IIV4 VACC NO PRSV 0.5 ML IM: CPT

## 2019-10-10 RX ORDER — METFORMIN HCL 500 MG
2000 TABLET, EXTENDED RELEASE 24 HR ORAL
Qty: 360 TABLET | Refills: 1 | Status: SHIPPED | OUTPATIENT
Start: 2019-10-10 | End: 2019-10-10

## 2019-10-10 RX ORDER — METFORMIN HCL 500 MG
2000 TABLET, EXTENDED RELEASE 24 HR ORAL
Qty: 360 TABLET | Refills: 1 | Status: SHIPPED | OUTPATIENT
Start: 2019-10-10 | End: 2020-04-23

## 2019-10-10 NOTE — PROGRESS NOTES
"SUBJECTIVE/OBJECTIVE:                Jeanmarie Mckinley is a 44 year old male coming in for a follow-up visit for Medication Therapy Management.  He was referred to me from Dr. Juares.     Chief Complaint: Follow up from MTM visit on 2/21/19.  Med review and diabetes follow-up  Personal Healthcare Goals: Try to get his weight down to 220 pounds  Tobacco: No tobacco use  Alcohol: Less than 1 beverages / week    Medication Adherence/Access: Sometimes misses second dose of Advair, otherwise no issues. Reports missing Victoza once in 3 months.     Diabetes:  Pt currently taking metformin IR 500mg - 4 tablets once daily and Victoza 1.2mg daily. Pt is not experiencing side effects. .   SMBG: rarely.   Ranges (patient reported): Fasting usually around 140, but this comes down to around 100 before meals, 140-150 after meals.   Patient is not experiencing hypoglycemia  Recent symptoms of high blood sugar? None right now - really can notice that when his blood sugars are up he urinates frequently. It's a good \"tell\" for him.   Eye exam: up to date  Foot exam: up to date  ACEi/ARB: Yes: Lisinopril.   Urine Albumin:   Lab Results   Component Value Date    UMALCR 29.43 (H) 07/15/2019   Aspirin: Not taking due to age  Diet/Exercise: no significant changes recently, he has not noticed reduced appetite or intake since increasing his Victoza dose. Hopes to start exercising soon to get his weight down.    Asthma: Current asthma medications:Fluticasone+Salmeterol (Advair) 115-21mcg (this was switched from the diskus to the HFA at last refill, he prefers the diskus). Also has Ventolin PRN. Asthma triggers include: upper respiratory infections and exercise or sports.   Pt reports the following symptoms: none.  ACT Total Scores 6/25/2018 2/21/2019 10/10/2019   ACT TOTAL SCORE (Goal Greater than or Equal to 20) 21 24 23   In the past 12 months, how many times did you visit the emergency room for your asthma without being admitted to the " hospital? 0 0 0   In the past 12 months, how many times were you hospitalized overnight because of your asthma? 0 0 0       Hypertension: Current medications include lisinopril 10 mg daily.  Patient does not self-monitor BP.  Patient reports no current medication side effects.  BP Readings from Last 3 Encounters:   10/10/19 122/62   07/15/19 124/82   02/21/19 124/88     Hyperlipidemia: Current therapy includes Simvastatin 40mg once daily.  Pt reports no significant myalgias or other side effects.  Lab Results   Component Value Date    CHOL 113 07/15/2019     Lab Results   Component Value Date    HDL 34 07/15/2019     Lab Results   Component Value Date    LDL 65 07/15/2019     Lab Results   Component Value Date    TRIG 68 07/15/2019     Skin: Had some eczema this summer, but triamcinolone took care of this. Has not needed it sense.     Today's Vitals: /62   Wt 239 lb 12.8 oz (108.8 kg)   BMI 34.41 kg/m      ASSESSMENT:                Medication Adherence: no issues identified    Diabetes:  Last A1c at goal, BG in the morning are possibly a little bit high, but difficult to tell with self-reported values. Given how well he has been doing, will encourage continued work on diet/exercise and recheck labs after the first of the year. Would be beneficial to change metformin back to ER formulation so that he can continue to take this once daily. Additionally he needs a flu shot today.     Asthma: ACT at goal today. Although he is not taking Advair BID everyday, he's not having asthma sx at this time so will hold off on switching him to Breo - but this could be an option in the future. Will try to switch him back to Advair diskus.     Hypertension: Stable, BP is at goal of <140/90 today    Hyperlipidemia: Stable    Skin: Stable     PLAN:                  1. Checked insurance - Advair diskus should be covered, Rx sent.   2. Rx for Metformin ER sent to pharmacy.  3. Flu shot today  4. Pt to schedule labs and PCP visit  in January.     I spent 30 minutes with this patient today. All changes were made via collaborative practice agreement with Dr. Juares. A copy of the visit note was provided to the patient's primary care provider.     Will follow up in 6 months.     The patient was sent via [employer] a summary of these recommendations as an after visit summary.    Jennifer Han, PharmD  Medication Therapy Management Resident  Anders MartiD, VITO, BCACP   Medication Therapy Management Pharmacist

## 2019-10-10 NOTE — PATIENT INSTRUCTIONS
Recommendations from today's MTM visit:                                                      1. We checked your insurance - Advair diskus should be covered, so we sent a prescription to [facility] mail order. If you run into any problems, let us know. Additionally, there is an inhaler very similar to Advair that can be taken once a day (instead of twice a day). It's called Breo Ellipta. It may be more convenient, but also a little it more expensive. Let us know if you are interested in learning more or want to see what it would cost.     2. We sent a refill for Metformin extended-release to your pharmacy. Continue to finish up what you have, but then switch to this one. It may be easier on your stomach and it gives you the flexibility to take all 4 tablets at once.     3. Flu shot today    4. You should come back in to see Dr. Juares and get your labs drawn in January.      It was great to speak with you today.  I value your experience and would be very thankful for your time with providing feedback on our clinic survey. You may receive a survey via email or text message in the next few days.     Next MTM visit: 6 months, sooner if needed.     To schedule another MTM appointment, please call the clinic directly or you may call the MTM scheduling line at [phone] or toll-free at [phone].     My Clinical Pharmacist's contact information:                                                      It was a pleasure talking with you today!  Please feel free to contact me with any questions or concerns you have.      Jennifer Han PharmD  Medication Therapy Management Resident, [facility]  Pager: [phone]  Email:: [email or web address]    Faith Montague, Pharm.D., M.B.A., BCACP  Medication Therapy Management Pharmacist, [facility]  Pager: [phone]  Email: [email or web address]

## 2019-10-10 NOTE — PROGRESS NOTES
Prior to immunization administration, verified patients identity using patient s name and date of birth. Please see Immunization Activity for additional information.     Screening Questionnaire for Adult Immunization    Are you sick today?   No   Do you have allergies to medications, food, a vaccine component or latex?   No   Have you ever had a serious reaction after receiving a vaccination?   No   Do you have a long-term health problem with heart disease, lung disease, asthma, kidney disease, metabolic disease (e.g. diabetes), anemia, or other blood disorder?   No   Do you have cancer, leukemia, HIV/AIDS, or any other immune system problem?   No   In the past 3 months, have you taken medications that affect  your immune system, such as prednisone, other steroids, or anticancer drugs; drugs for the treatment of rheumatoid arthritis, Crohn s disease, or psoriasis; or have you had radiation treatments?   No   Have you had a seizure, or a brain or other nervous system problem?   No   During the past year, have you received a transfusion of blood or blood     products, or been given immune (gamma) globulin or antiviral drug?   No   For women: Are you pregnant or is there a chance you could become        pregnant during the next month?   No   Have you received any vaccinations in the past 4 weeks?   No     Immunization questionnaire answers were all negative.        Per orders of Dr. Juares, injection of Influenza given by Bogdan Noonan. Patient instructed to remain in clinic for 15 minutes afterwards, and to report any adverse reaction to me immediately.       Screening performed by Bogdan Noonan on 10/10/2019 at 10:31 AM.

## 2019-10-11 ASSESSMENT — ASTHMA QUESTIONNAIRES: ACT_TOTALSCORE: 23

## 2020-03-10 ENCOUNTER — HEALTH MAINTENANCE LETTER (OUTPATIENT)
Age: 45
End: 2020-03-10

## 2020-04-23 ENCOUNTER — VIRTUAL VISIT (OUTPATIENT)
Dept: FAMILY MEDICINE | Facility: CLINIC | Age: 45
End: 2020-04-23
Payer: COMMERCIAL

## 2020-04-23 DIAGNOSIS — E11.9 CONTROLLED TYPE 2 DIABETES MELLITUS WITHOUT COMPLICATION, WITHOUT LONG-TERM CURRENT USE OF INSULIN (H): ICD-10-CM

## 2020-04-23 DIAGNOSIS — J45.998 ASTHMA, PERSISTENT CONTROLLED: ICD-10-CM

## 2020-04-23 DIAGNOSIS — E11.65 TYPE 2 DIABETES MELLITUS WITH HYPERGLYCEMIA, WITHOUT LONG-TERM CURRENT USE OF INSULIN (H): ICD-10-CM

## 2020-04-23 PROCEDURE — 99214 OFFICE O/P EST MOD 30 MIN: CPT | Mod: 95 | Performed by: FAMILY MEDICINE

## 2020-04-23 RX ORDER — METFORMIN HCL 500 MG
2000 TABLET, EXTENDED RELEASE 24 HR ORAL
Qty: 360 TABLET | Refills: 1 | Status: SHIPPED | OUTPATIENT
Start: 2020-04-23 | End: 2020-07-02

## 2020-04-23 RX ORDER — LIRAGLUTIDE 6 MG/ML
1.2 INJECTION SUBCUTANEOUS DAILY
Qty: 6 ML | Refills: 0 | Status: SHIPPED | OUTPATIENT
Start: 2020-04-23 | End: 2020-07-15

## 2020-04-23 RX ORDER — METFORMIN HCL 500 MG
TABLET, EXTENDED RELEASE 24 HR ORAL
Qty: 360 TABLET | Refills: 1 | Status: SHIPPED | OUTPATIENT
Start: 2020-04-23 | End: 2020-07-16

## 2020-04-23 RX ORDER — BLOOD-GLUCOSE METER
KIT MISCELLANEOUS
Qty: 200 EACH | Refills: 0 | Status: SHIPPED | OUTPATIENT
Start: 2020-04-23 | End: 2020-05-06

## 2020-04-23 NOTE — PROGRESS NOTES
"Jeanmarie Mckinley is a 44 year old male who is being evaluated via a billable video visit.      The patient has been notified of following:     \"This video visit will be conducted via a call between you and your physician/provider. We have found that certain health care needs can be provided without the need for an in-person physical exam.  This service lets us provide the care you need with a video conversation.  If a prescription is necessary we can send it directly to your pharmacy.  If lab work is needed we can place an order for that and you can then stop by our lab to have the test done at a later time.    Video visits are billed at different rates depending on your insurance coverage.  Please reach out to your insurance provider with any questions.    If during the course of the call the physician/provider feels a video visit is not appropriate, you will not be charged for this service.\"    Patient has given verbal consent for Video visit? Yes    How would you like to obtain your AVS? [facility]    Patient would like the video invitation sent by: Text to cell phone: [phone]    Will anyone else be joining your video visit? No      Subjective     Jeanmarie Mckinley is a 44 year old male who presents to clinic today for the following health issues:    HPI  Diabetes Follow-up      How often are you checking your blood sugar? Not at all THE LAST MONTH    What concerns do you have today about your diabetes? None     Do you have any of these symptoms? (Select all that apply)  No numbness or tingling in feet.  No redness, sores or blisters on feet.  No complaints of excessive thirst.  No reports of blurry vision.  No significant changes to weight.      BP Readings from Last 2 Encounters:   10/10/19 122/62   07/15/19 124/82     Hemoglobin A1C (%)   Date Value   07/15/2019 6.1 (H)   02/21/2019 7.8 (H)     LDL Cholesterol Calculated (mg/dL)   Date Value   07/15/2019 65   06/25/2018 79           How many servings of " "fruits and vegetables do you eat daily?  2-3    On average, how many sweetened beverages do you drink each day (Examples: soda, juice, sweet tea, etc.  Do NOT count diet or artificially sweetened beverages)?   0    How many days per week do you exercise enough to make your heart beat faster? 5    How many minutes a day do you exercise enough to make your heart beat faster? 30 - 60    How many days per week do you miss taking your medication? 0         Video Start Time:122pm    Known history of diabetes.  Reports he is trying to watch diet as best as possible though lately been eating some desserts that he makes.  Has been out of freestyle test strips and would like refill has not been testing blood sugar.  Reports weight should be stable as has been watching the diet.  Working from home &  from  & [facility]  Needs refill on medication.  Asthma seems to be controlled sometimes in the back of his throat he feels mucus but no known seasonal allergies either.  No side effects from any medication.    PROBLEMS TO ADD ON...    BP Readings from Last 3 Encounters:   10/10/19 122/62   07/15/19 124/82   02/21/19 124/88    Wt Readings from Last 3 Encounters:   10/10/19 108.8 kg (239 lb 12.8 oz)   07/15/19 107.4 kg (236 lb 11.2 oz)   02/21/19 110.3 kg (243 lb 3.2 oz)                    Reviewed and updated as needed this visit by Provider         Review of Systems   ROS COMP: Constitutional, HEENT, cardiovascular, pulmonary, GI, , musculoskeletal, neuro, skin, endocrine and psych systems are negative, except as otherwise noted.      Objective    There were no vitals taken for this visit.  Estimated body mass index is 34.41 kg/m  as calculated from the following:    Height as of 7/15/19: 1.778 m (5' 10\").    Weight as of 10/10/19: 108.8 kg (239 lb 12.8 oz).  Physical Exam   Wt 232lbs    GENERAL: healthy, alert and no distress  EYES: Eyes grossly normal to inspection, conjunctivae and sclerae normal  RESP: no audible wheeze, " "cough, or visible cyanosis.  No visible retractions or increased work of breathing.  Able to speak fully in complete sentences.  NEURO: Cranial nerves grossly intact, mentation intact and speech normal  PSYCH: mentation appears normal, affect normal/bright, judgement and insight intact, normal speech and appearance well-groomed      Diagnostic Test Results:  Labs reviewed in Epic  none         Assessment & Plan     1. Type 2 diabetes mellitus with hyperglycemia, without long-term current use of insulin (H)  preseumed controlled  Advised to recheck SMBG - at least once daily for a few times a week  [employer] am/ or premeals  < 100  2 hr post meals < 140   Follow up in 3 months- post covid for office and lab visits      - FREESTYLE LITE test strip; Test twice daily  Dispense: 200 each; Refill: 0  - liraglutide (VICTOZA) 18 MG/3ML solution; Inject 1.2 mg Subcutaneous daily  Dispense: 6 mL; Refill: 0  - metFORMIN (GLUCOPHAGE-XR) 500 MG 24 hr tablet; Take 4 tablets (2,000 mg) by mouth daily (with breakfast)  Dispense: 360 tablet; Refill: 1  3. Asthma, persistent controlled  Changed inhaler- stop advair, start dulera.  - mometasone-formoterol (DULERA) 100-5 MCG/ACT inhaler; Inhale 2 puffs into the lungs 2 times daily  Dispense: 1 Inhaler; Refill: 3     BMI:   Estimated body mass index is 34.41 kg/m  as calculated from the following:    Height as of 7/15/19: 1.778 m (5' 10\").    Weight as of 10/10/19: 108.8 kg (239 lb 12.8 oz).   Weight management plan: Discussed healthy diet and exercise guidelines        See Patient Instructions    Return in about 3 months (around 7/23/2020) for DM.    Krista Juares MD  [facility]      Video-Visit Details    Type of service:  Video Visit    Video End Time:1:37 PM    Originating Location (pt. Location): Home    Distant Location (provider location):  [facility]     Mode of Communication:  Video Conference via [employer]    Return in about 3 months (around " 7/23/2020) for DM.       Krista Juares MD

## 2020-04-23 NOTE — TELEPHONE ENCOUNTER
"Left non detailed VM for pt asking that they callback and schedule phone visit  Fatemeh OSORIO RN    Last Written Prescription Date:  10/10/2019  Last Fill Quantity: 360,  # refills: 1   Last office visit: 10/10/2019 with prescribing provider:     Future Office Visit:    Requested Prescriptions   Pending Prescriptions Disp Refills     metFORMIN (GLUCOPHAGE-XR) 500 MG 24 hr tablet [Pharmacy Med Name: METFORMIN ER TAB 500MG GP] 360 tablet 1     Sig: TAKE 4 TABLETS (2,000MG)   DAILY WITH BREAKFAST       Biguanide Agents Failed - 4/23/2020 11:25 AM        Failed - Patient has documented A1c within the specified period of time.     If HgbA1C is 8 or greater, it needs to be on file within the past 3 months.  If less than 8, must be on file within the past 6 months.     Recent Labs   Lab Test 07/15/19  1319   A1C 6.1*             Failed - Recent (6 mo) or future (30 days) visit within the authorizing provider's specialty     Patient had office visit in the last 6 months or has a visit in the next 30 days with authorizing provider or within the authorizing provider's specialty.  See \"Patient Info\" tab in inbasket, or \"Choose Columns\" in Meds & Orders section of the refill encounter.            Passed - Patient is age 10 or older        Passed - Patient's CR is NOT>1.4 OR Patient's EGFR is NOT<45 within past 12 mos.     Recent Labs   Lab Test 08/14/19  0735   GFRESTIMATED >90   GFRESTBLACK >90       Recent Labs   Lab Test 08/14/19  0735   CR 1.00             Passed - Patient does NOT have a diagnosis of CHF.        Passed - Medication is active on med list           "

## 2020-04-23 NOTE — TELEPHONE ENCOUNTER
A.S,   Please advise on refill at today's appt  Thanks,  Fatemeh OSORIO RN    Next 5 appointments (look out 90 days)    Apr 23, 2020  1:40 PM CDT  Telephone Visit with Krista Juares MD  [facility] [address]  [facility] [id number] [phone]

## 2020-04-27 ENCOUNTER — MYC MEDICAL ADVICE (OUTPATIENT)
Dept: FAMILY MEDICINE | Facility: CLINIC | Age: 45
End: 2020-04-27

## 2020-04-27 DIAGNOSIS — F41.9 ANXIETY: Primary | ICD-10-CM

## 2020-04-27 NOTE — TELEPHONE ENCOUNTER
JS,  Please see below [employer] message and advise in A.S' absence.  Thanks,  Fatemeh OSORIO RN

## 2020-04-27 NOTE — TELEPHONE ENCOUNTER
Will gladly place referral, just need to associate with dx.  I do not see any mental health concerns on problem list.    Go Hopson PA-C

## 2020-05-04 ENCOUNTER — TELEPHONE (OUTPATIENT)
Dept: FAMILY MEDICINE | Facility: CLINIC | Age: 45
End: 2020-05-04

## 2020-05-04 NOTE — TELEPHONE ENCOUNTER
Reason for Call:  Other prescription    Detailed comments: [facility] pharmacy calling.  Freestyle is not covered    The patient wants to go with Accucheck guide, accucheck guide test strips, And accucheck fast click  lancets instead.     Phone Number Patient can be reached at: [phone]  # for [place]   reference # [id number]  Best Time:     Can we leave a detailed message on this number? Not Applicable    Call taken on 5/4/2020 at 12:00 PM by Emily Barros

## 2020-05-05 ENCOUNTER — TELEPHONE (OUTPATIENT)
Dept: FAMILY MEDICINE | Facility: CLINIC | Age: 45
End: 2020-05-05

## 2020-05-05 DIAGNOSIS — E11.9 CONTROLLED TYPE 2 DIABETES MELLITUS WITHOUT COMPLICATION, WITHOUT LONG-TERM CURRENT USE OF INSULIN (H): ICD-10-CM

## 2020-05-05 DIAGNOSIS — E11.65 TYPE 2 DIABETES MELLITUS WITH HYPERGLYCEMIA, WITHOUT LONG-TERM CURRENT USE OF INSULIN (H): ICD-10-CM

## 2020-05-06 RX ORDER — BLOOD-GLUCOSE METER
KIT MISCELLANEOUS
Qty: 200 EACH | Refills: 0 | Status: SHIPPED | OUTPATIENT
Start: 2020-05-06 | End: 2020-07-02

## 2020-05-06 RX ORDER — PEN NEEDLE, DIABETIC 32GX 5/32"
NEEDLE, DISPOSABLE MISCELLANEOUS
Qty: 100 EACH | Refills: 0 | Status: SHIPPED | OUTPATIENT
Start: 2020-05-06 | End: 2022-04-29

## 2020-05-06 NOTE — TELEPHONE ENCOUNTER
Prescription approved per [facility].  Fatemeh OSORIO RN

## 2020-05-12 RX ORDER — LANCETS
EACH MISCELLANEOUS
Qty: 102 EACH | Refills: 6 | Status: SHIPPED | OUTPATIENT
Start: 2020-05-12 | End: 2020-07-02

## 2020-05-12 RX ORDER — LANCING DEVICE/LANCETS
KIT MISCELLANEOUS
Qty: 1 EACH | Refills: 0 | Status: SHIPPED | OUTPATIENT
Start: 2020-05-12

## 2020-05-12 NOTE — TELEPHONE ENCOUNTER
Pharmacy received the wrong Rx. Need the Accuchek guide meter and test strips. And the Accuchek fastclix lancets.    What we sent (freestyle lite) was incorrect

## 2020-05-14 ENCOUNTER — VIRTUAL VISIT (OUTPATIENT)
Dept: PSYCHOLOGY | Facility: CLINIC | Age: 45
End: 2020-05-14
Attending: PHYSICIAN ASSISTANT
Payer: COMMERCIAL

## 2020-05-14 DIAGNOSIS — F43.20 ADJUSTMENT DISORDER, UNSPECIFIED TYPE: Primary | ICD-10-CM

## 2020-05-14 PROCEDURE — 99207 ZZC NO BILLABLE SERVICE THIS VISIT: CPT | Mod: 95 | Performed by: SOCIAL WORKER

## 2020-05-14 ASSESSMENT — COLUMBIA-SUICIDE SEVERITY RATING SCALE - C-SSRS
1. IN THE PAST MONTH, HAVE YOU WISHED YOU WERE DEAD OR WISHED YOU COULD GO TO SLEEP AND NOT WAKE UP?: NO
2. HAVE YOU ACTUALLY HAD ANY THOUGHTS OF KILLING YOURSELF LIFETIME?: NO
2. HAVE YOU ACTUALLY HAD ANY THOUGHTS OF KILLING YOURSELF?: NO
1. IN THE PAST MONTH, HAVE YOU WISHED YOU WERE DEAD OR WISHED YOU COULD GO TO SLEEP AND NOT WAKE UP?: NO

## 2020-05-14 ASSESSMENT — ANXIETY QUESTIONNAIRES
6. BECOMING EASILY ANNOYED OR IRRITABLE: SEVERAL DAYS
GAD7 TOTAL SCORE: 3
1. FEELING NERVOUS, ANXIOUS, OR ON EDGE: SEVERAL DAYS
5. BEING SO RESTLESS THAT IT IS HARD TO SIT STILL: NOT AT ALL
2. NOT BEING ABLE TO STOP OR CONTROL WORRYING: NOT AT ALL
3. WORRYING TOO MUCH ABOUT DIFFERENT THINGS: NOT AT ALL
7. FEELING AFRAID AS IF SOMETHING AWFUL MIGHT HAPPEN: NOT AT ALL

## 2020-05-14 ASSESSMENT — PATIENT HEALTH QUESTIONNAIRE - PHQ9
SUM OF ALL RESPONSES TO PHQ QUESTIONS 1-9: 5
5. POOR APPETITE OR OVEREATING: SEVERAL DAYS

## 2020-05-14 NOTE — PROGRESS NOTES
Progress Note - Initial Session    Client Name:  Jeanmarie Mckinley Date: 5/14/2020         Service Type: Individual     Session Start Time: 8 AM  Session End Time: 8:52 AM     Session Length: 52 mins    Session #: 1    Attendees: Client attended alone    Telemedicine Visit: The patient's condition can be safely assessed and treated via synchronous audio and visual telemedicine encounter.      Reason for Telemedicine Visit: Patient has requested telehealth visit and Services only offered telehealth    Originating Site (Patient Location): Patient's home    Distant Site (Provider Location): Provider Remote Setting    Consent:  The patient/guardian has verbally consented to: the potential risks and benefits of telemedicine (video visit) versus in person care; bill my insurance or make self-payment for services provided; and responsibility for payment of non-covered services.      Mode of Communication:  Video Conference via [employer]    As the provider I attest to compliance with applicable laws and regulations related to telemedicine.     DATA:  Diagnostic Assessment in progress.  Unable to complete documentation at the conclusion of the first session due to reviewing rights and responsibilities, expectation for therapy and current stressors . Patient is currently going through a divorce after separation of 3-4 years with wife. He expressed interest in using therapy to navigate his divorce, explore a new relationship and co-parenting in a healthy manner. PHQ 9/SHARIF 7, WHODAS and Noble completed in session.   Interactive Complexity: No  Crisis: No    Intervention:  Motivational Interviewing: Reflected on challenges of accumulation of stressors, supported patient's approach to addressing these stressors, assessing patient's readiness to explore change and commitment to therapy.  Motivational Interviewing    MI Intervention: Expressed Empathy/Understanding, Supported Autonomy, Collaboration, Evocation,  Permission to raise concern or advise, Open-ended questions and Reflections: simple and complex     Change Talk Expressed by the Patient: Desire to change Ability to change Reasons to change Need to change Committment to change    Provider Response to Change Talk: E - Evoked more info from patient about behavior change, A - Affirmed patient's thoughts, decisions, or attempts at behavior change, R - Reflected patient's change talk and S - Summarized patient's change talk statements      ASSESSMENT:  Mental Status Assessment:  Appearance:   Appropriate   Eye Contact:   Good   Psychomotor Behavior: Normal   Attitude:   Cooperative  Pleasant  Orientation:   All  Speech   Rate / Production: Normal/ Responsive   Volume:  Normal   Mood:    Euthymic  Affect:    Appropriate   Thought Content:  Clear   Thought Form:  Coherent  Goal Directed  Logical   Insight:    Good       Safety Issues and Plan for Safety and Risk Management:     Stafford Suicide Severity Rating Scale (Lifetime/Recent)  Stafford Suicide Severity Rating (Lifetime/Recent) 5/14/2020   1. Wish to be Dead (Lifetime) No   1. Wish to be Dead (Recent) No   2. Non-Specific Active Suicidal Thoughts (Lifetime) No   2. Non-Specific Active Suicidal Thoughts (Recent) No   Has subject engaged in non-suicidal self-injurious behavior? (Lifetime) No   Has subject engaged in non-suicidal self-injurious behavior? (Past 3 Months) No     Patient denies current fears or concerns for personal safety.  Patient denies current or recent suicidal ideation or behaviors.  Patient denies current or recent homicidal ideation or behaviors.  Patient denies current or recent self injurious behavior or ideation.  Patient denies other safety concerns.  Recommended that patient call 911 or go to the local ED should there be a change in any of these risk factors.  Patient reports there are no firearms in the house.     Diagnostic Criteria:  A. The development of emotional or behavioral symptoms  in response to an identifiable stressor(s) occurring within 3 months of the onset of the stressor(s)  B. These symptoms or behaviors are clinically significant, as evidenced by one or both of the following:       - Marked distress that is out of proportion to the severity/intensity of the stressor (with consideration for external context & culture)       - Significant impairment in social, occupational, or other important areas of functioning  C. The stress-related disturbance does not meet criteria for another disorder & is not not an exacerbation of another mental disorder  D. The symptoms do not represent normal bereavement  E. Once the stressor or its consequences have terminated, the symptoms do not persist for more than an additional 6 months      DSM5 Diagnoses: (Sustained by DSM5 Criteria Listed Above)  Diagnoses: Adjustment Disorders  309.9 (F43.20) Unspecified  Psychosocial & Contextual Factors: Process of divorce, coparenting, work stressor  WHODAS 2.0 (12 item):   WHODAS 2.0 Total Score 5/14/2020   Total Score 18       Collateral Reports Completed:  Will route to PCP once DA is complete      PLAN: (Homework, other):  Patient stated that he may follow up for ongoing services with [facility].  Second session scheduled       GIUSEPPE Vanegas LGENDER     May 14, 2020  Note reviewed and clinical supervision by GIUSEPPE Potts [facility] 5/15/2020

## 2020-05-15 ASSESSMENT — ANXIETY QUESTIONNAIRES: GAD7 TOTAL SCORE: 3

## 2020-05-22 ENCOUNTER — VIRTUAL VISIT (OUTPATIENT)
Dept: PSYCHOLOGY | Facility: CLINIC | Age: 45
End: 2020-05-22
Payer: COMMERCIAL

## 2020-05-22 DIAGNOSIS — F43.20 ADJUSTMENT DISORDER, UNSPECIFIED TYPE: Primary | ICD-10-CM

## 2020-05-22 PROCEDURE — 90791 PSYCH DIAGNOSTIC EVALUATION: CPT | Mod: 95 | Performed by: SOCIAL WORKER

## 2020-05-22 NOTE — Clinical Note
Dr. Juares,  Thanks for the mental health referral. Please see note for completed diagnostic assessment. Let me know if there are questions.    GIUSEPPE Vanegas  LGSW

## 2020-05-22 NOTE — PROGRESS NOTES
"  [facility]  Evaluator Name:  Dandy Harper     Credentials:  [facility] LGSW    PATIENT'S NAME: Jeanmarie Mckinley  PREFERRED NAME: \"Jeanmarie\"  PREFERRED PRONOUNS:   He/Him/His    MRN:   [id number]  :   1975   ACCT. NUMBER: [id number]  DATE OF SERVICE: 20  START TIME: 12:07 PM  END TIME: 12:57 PM  PREFERRED PHONE: [phone]  May we leave a program related message: Yes    STANDARD ADULT DIAGNOSTIC ASSESSMENT    Telemedicine Visit: The patient's condition can be safely assessed and treated via synchronous audio and visual telemedicine encounter.      Reason for Telemedicine Visit: Patient has requested telehealth visit and Services only offered telehealth    Originating Site (Patient Location): Patient's home    Distant Site (Provider Location): Provider Remote Setting    Consent:  The patient/guardian has verbally consented to: the potential risks and benefits of telemedicine (video visit) versus in person care; bill my insurance or make self-payment for services provided; and responsibility for payment of non-covered services.     Mode of Communication:  Video Conference via [employer]    As the provider I attest to compliance with applicable laws and regulations related to telemedicine.    Identifying Information:  Patient is a 44 year old, .  The pronoun use throughout this assessment reflects the patient's chosen pronoun.  Patient was referred for an assessment by self.  Patient attended the session alone.     Chief Complaint:   The reason for seeking services at this time is: \"I have the proper way to navigate my divorce and maintain relationship with my children and move on with a new relationship.\"  Patient would like to set healthy boundaries in relationships. The problem(s) began in  when patient and wife . Patient noted parenting stressors and financial stressors at the time and noticed he became more withdrawn. Failure to set appropriate boundaries resulted " "in patient needing to put in more efforts in relationships.  Patient has attempted to resolve these concerns in the past through talking with PCP and family.    Does the client have any condition that is currently presenting as a potential to harm themselves or others (severe withdrawal, serious medical condition, severe emotional/behavioral problem)? No.  Proceed with assessment.    Social/Family History:  Patient reported they grew up in [place], [place].  They were raised by biological mother.  Parents were  and eventually . His father passed via suicide when he was 10 years old. Patient reports being middle of three children.   Patient reported that  his   childhood was \"good.\" He stated that did well in school and gravitated toward leadership roles. He stated that relationship with mother was good overall. There were some conflict with younger brother however this has been resolved.   Patient described their current relationships with family of origin as \"good.\" Patient reports being intentional about spending time with family members.      The patient describes their cultural background as  [place] with combination of [facility] from mother and southern culture and segregation from father's side.  Cultural influences and impact on patient's life structure, values, norms, and healthcare: Racial or Ethnic Self-Identification : , raised primarily by mother, Social Orientation: Strong connection with family and children and Locus of Control: Internal control, can shape direction in his life value of family, elimination of judgement, kindness and forgiveness, emphases on awareness and understanding around  community, intentions on taking breaks/vacation.  Contextual influences on patient's health include: Individual Factors : Medical concerns, co-parenting, Family Factors : Divorce pending and Economic Factors : business own, works two jobs.    These factors will be " "addressed in the Preliminary Treatment plan.  Patient identified their preferred language to be English. Patient reported they does not need the assistance of an  or other support involved in therapy.     Patient reported had no significant delays in developmental tasks.   Patient's highest education level was college graduate. Patient identified the following learning problems: none reported.  Modifications will not be used to assist communication in therapy.  Patient reports they are  able to understand written materials.    Patient reported the following relationship history 4.  Patient's current relationship status is process of divorce and was  for 11 years.   Patient identified their sexual orientation as heterosexual.  Patient reported having three child(cosme) and a stepdaughter.     Patient's current living/housing situation involves staying in own home/apartment.  They live with self  And children and they report that housing is stable. Patient identified mother, siblings and significant other and Uncle and Aunts as part of their support system.  Patient identified the quality of these relationships as stable and meaningful.      Patient is currently employed full time and reports they are able to function appropriately at work..  Patient reports their finances are obtained through employment.  Patient does identify finances as a current stressor.      Patient reported that they have been involved with the legal system. Patient denies being on probation / parole / under the jurisdiction of the court.        Patient's Strengths and Limitations:  Patient identified the following strengths or resources that will help them succeed in treatment: commitment to health and well being, family support, insight, intelligence, motivation, strong social skills and work ethic. Things that may interfere with the patient's success in treatment include: \"maybe my ability to be forthcoming\". "   _______________________________________________  Personal and Family Medical History:   Patient did report a family history of mental health concerns: Father with depression and bipolar.   Patient reports family history includes Diabetes in his mother; Hypertension in his mother..     Patient reported the following previous diagnoses which include(s): none reported.  Patient reported symptoms began n/a.   Patient has received mental health services in the past: therapy with couple's counseling and individual therapy.  Psychiatric Hospitalizations: None.  Patient denies a history of civil commitment.  Currently, patient is not receiving other mental health services.  These include none.   Patient has had a physical exam to rule out medical causes for current symptoms.  Date of last physical exam was within the past year. Client was encouraged to follow up with PCP if symptoms were to develop. The patient has a [facility] Provider, who is named Krista Juares.  Patient reports the following current medical concerns: Diabetes, high blood pressure .  There are not significant appetite / nutritional concerns / weight changes.   Patient does not report a history of head injury / trauma / cognitive impairment.     Patient reports current meds as:   No outpatient medications have been marked as taking for the 5/22/20 encounter (Appointment) with Dandy Harper LGSW.       Medication Adherence:  Patient reports taking prescribed medications as prescribed.    Patient Allergies:    Allergies   Allergen Reactions     Dust Mites      Seasonal Allergies        Medical History:    Past Medical History:   Diagnosis Date     Benign essential hypertension 4/5/2016    control -lisinorpril 10 mg once a day      Diabetes mellitus type 2, controlled (H) 4/5/2016    Metformin 2000/day SEE MTM SEE EYE          Current Mental Status Exam:   Appearance:  Appropriate    Eye Contact:  Good   Psychomotor:  Normal       Gait /  station:  no problem  Attitude / Demeanor: Cooperative  Pleasant  Speech      Rate / Production: Normal/ Responsive      Volume:  Normal  volume      Language:  intact and no problems  Mood:   Euthymic  Affect:   Appropriate    Thought Content: Clear   Thought Process: Coherent  Logical       Associations: No loosening of associations  Insight:   Good   Judgment:  Intact   Orientation:  All  Attention/concentration: Good    Rating Scales:    PHQ9:    PHQ-9 SCORE 5/14/2020   PHQ-9 Total Score 5   ;    GAD7:    SHARIF-7 SCORE 5/14/2020   Total Score 3     CGI:     First:Considering your total clinical experience with this particular patient population, how severe are the patient's symptoms at this time?: 3 (5/14/2020  9:34 AM)  ;    Most recentCompared to the patient's condition at the START of treatment, this patient's condition is: 4 (5/14/2020  9:34 AM)      Substance Use:  Patient did report a family history of substance use concerns: Brother-cocaine, mother-alcohol, cousin with heroin, Uncle-Cocaine.  Patient has not received chemical dependency treatment in the past.  Patient has not ever been to detox.      Patient is not currently receiving any chemical dependency treatment. Patient reported the following problems as a result of their substance use: None.    Patient reports using alcohol 4-5 times per day and has 1-2 mixed drinks at a time. Patient first started drinking at age 21.  Patient reported date of last use was Last night.  Patient reports heaviest use was 2009- having 2-3 cocktails/night.  Patient denies using tobacco.  Patient denies using marijuana.  Patient reports using caffeine 3-4 times per day and drinks 1 at a time. Patient started using caffeine at age 25.  Patient reports using/abusing the following substance(s). Patient reported no other substance use.     CAGE- AID:    CAGE-AID Total Score 5/22/2020   Total Score 1       Substance Use: No symptoms    Based on the negative CAGE score and  clinical interview there  are not indications of drug or alcohol abuse.      Significant Losses / Trauma / Abuse / Neglect Issues:   Patient did not serve in the .  There are indications or report of significant loss, trauma, abuse or neglect issues related to: see below.  Concerns for possible neglect are not present.  Father passed via suicide  Process of divorce  Passing of Uncle and grandparents    Safety Assessment:   Current Safety Concerns:  Bremer Suicide Severity Rating Scale (Lifetime/Recent)  Bremer Suicide Severity Rating (Lifetime/Recent) 5/14/2020   1. Wish to be Dead (Lifetime) No   1. Wish to be Dead (Recent) No   2. Non-Specific Active Suicidal Thoughts (Lifetime) No   2. Non-Specific Active Suicidal Thoughts (Recent) No   Has subject engaged in non-suicidal self-injurious behavior? (Lifetime) No   Has subject engaged in non-suicidal self-injurious behavior? (Past 3 Months) No     Patient denies current homicidal ideation and behaviors.  Patient denies current self-injurious ideation and behaviors.    Patient denied risk behaviors associated with substance use.  Patient denies any high risk behaviors associated with mental health symptoms.  Patient reports the following current concerns for their personal safety: None.  Patient reports there are firearms in the house. The firearms are secured in a locked space.     History of Safety Concerns:  Patient denied a history of homicidal ideation.     Patient denied a history of personal safety concerns.    Patient denied a history of assaultive behaviors.    Patient denied a history of assaultive behaviors.     Patient denied a history of risk behaviors associated with substance use.  Patient denies any history of high risk behaviors associated with mental health symptoms.  Patient reports the following protective factors: positive relationships positive social network and positive family connections, forward/future oriented thinking,  dedication to family/friends, safe and stable environment, regular sleep, daily obligations, structured day, committment to well-being, positive social skills, financial stability, strong sense of self-worth/esteem and sense of personal control or determination    Risk Plan:  See Preliminary Treatment Plan for Safety and Risk Management Plan    Review of Symptoms per patient report:  Depression: No symptoms  Nereida:  No Symptoms   Psychosis: No Symptoms  Anxiety: No Symptoms  Panic:  No symptoms  Post Traumatic Stress Disorder:  No Symptoms   Eating Disorder: No Symptoms  ADD / ADHD:  No symptoms  Conduct Disorder: No symptoms  Autism Spectrum Disorder: Deficits in developing, maintaining, and understanding relationships  Obsessive Compulsive Disorder: No Symptoms    Patient reports the following compulsive behaviors and treatment history: None reported.      Diagnostic Criteria:   A. The development of emotional or behavioral symptoms in response to an identifiable stressor(s) occurring within 3 months of the onset of the stressor(s)  B. These symptoms or behaviors are clinically significant, as evidenced by one or both of the following:       - Marked distress that is out of proportion to the severity/intensity of the stressor (with consideration for external context & culture)       - Significant impairment in social, occupational, or other important areas of functioning  C. The stress-related disturbance does not meet criteria for another disorder & is not not an exacerbation of another mental disorder  D. The symptoms do not represent normal bereavement  E. Once the stressor or its consequences have terminated, the symptoms do not persist for more than an additional 6 months    Functional Status:  Patient reports the following functional impairments: childcare / parenting and relationship(s).     WHODAS:   WHODAS 2.0 Total Score 5/14/2020   Total Score 18       Clinical Summary:  1. Reason for assessment:  Support during divorce. Building healthy relationships and boundaries  2. Psychosocial, Cultural and Contextual Factors: Family hx of substance use and mental health, process of divorce, coparenting, business owner  3. As evidenced by self report and criteria, client meets the following DSM5 Diagnoses:   (Sustained by DSM5 Criteria Listed Above)  Adjustment Disorders  309.9 (F43.20) Unspecified.  Other Diagnoses that is relevant to services: n/a  4. R/O: n/a  5. Provisional Diagnosis:  n/a  6. Prognosis: Expect Improvement and Maintain Current Status / Prevent Deterioration.  7. Likely consequences of symptoms if not treated: Improvement in relationships.  8. Client strengths include:  caring, educated, employed, goal-focused, good listener, has a previous history of therapy, insightful, intelligent, motivated, open to learning, open to suggestions / feedback, responsible parent, support of family, friends and providers, supportive, wants to learn, willing to ask questions, willing to relate to others and work history .     Recommendations:     1. Plan for Safety and Risk Management:Recommended that patient call 911 or go to the local ED should there be a change in any of these risk factors..  Report to child / adult protection services was NA.     2. Patient's identified mental health concerns with a cultural influence will be addressed by understanding its impact and identifying values to direct change.     3. Initial Treatment will focus on: Adjustment Difficulties related to: loss of signigicant relationship  Relational Problems related to: Conflict or difficulties with partner/spouse and Parent / child conflict.     4. Resources/Service Plan:       services are not indicated.     Modifications to assist communication are not indicated.     Additional disability accommodations are not indicated.      5. Collaboration:  Collaboration / coordination of treatment will be initiated with the following  support professionals: primary care physician.      6.  Referrals:  The following referral(s) will be initiated: None. Next Scheduled Appointment: n/a.  A Release of Information has been obtained for the following: none.    7. CAMILA: CAMILA:  Discussed the general effects of drugs and alcohol on health and well-being. Provider gave patient printed information about the effects of chemical use on their health and well being. Recommendations:  Reduce alcohol use to 2 drinks 2-3x/wk .     8. Records were reviewed at time of assessment.  Information in this assessment was obtained from the medical record and provided by patient who is a good historian.   Patient will have open access to their mental health medical record.      Eval type:  Mental Health    Staff Name/Credentials:  GIUSEPPE Vanegas ENDER  May 22, 2020  Note reviewed and clinical supervision by GIUSEPPE Potts [facility] 5/26/2020

## 2020-06-03 DIAGNOSIS — E11.65 TYPE 2 DIABETES MELLITUS WITH HYPERGLYCEMIA, WITHOUT LONG-TERM CURRENT USE OF INSULIN (H): ICD-10-CM

## 2020-06-03 RX ORDER — LANCETS
EACH MISCELLANEOUS
Qty: 102 EACH | Refills: 6
Start: 2020-06-03

## 2020-06-03 NOTE — TELEPHONE ENCOUNTER
"Requested Prescriptions   Pending Prescriptions Disp Refills     blood glucose monitoring (ACCU-CHEK FASTCLIX) lancets 102 each 6     Sig: Use to test blood sugar 2  times daily.       Diabetic Supplies Protocol Passed - 6/3/2020 10:27 AM        Passed - Medication is active on med list        Passed - Patient is 18 years of age or older        Passed - Recent (6 mo) or future (30 days) visit within the authorizing provider's specialty     Patient had office visit in the last 6 months or has a visit in the next 30 days with authorizing provider.  See \"Patient Info\" tab in inbasket, or \"Choose Columns\" in Meds & Orders section of the refill encounter.               Duplicate, pharmacy informed Duplicate, sent 5/12/20 for 102 with 6 refills.      "

## 2020-06-05 ENCOUNTER — VIRTUAL VISIT (OUTPATIENT)
Dept: PSYCHOLOGY | Facility: CLINIC | Age: 45
End: 2020-06-05
Payer: COMMERCIAL

## 2020-06-05 DIAGNOSIS — F43.20 ADJUSTMENT DISORDER, UNSPECIFIED TYPE: Primary | ICD-10-CM

## 2020-06-05 PROCEDURE — 90834 PSYTX W PT 45 MINUTES: CPT | Mod: 95 | Performed by: SOCIAL WORKER

## 2020-06-05 NOTE — PROGRESS NOTES
Progress Note    Patient Name: Jeanmarie Mckinley  Date: 6/5/2020         Service Type: Individual      Session Start Time: 12:05 PM  Session End Time: 12:50 PM     Session Length: 50 mins    Session #: 3    Attendees: Client attended alone    Telemedicine Visit: The patient's condition can be safely assessed and treated via synchronous audio and visual telemedicine encounter.      Reason for Telemedicine Visit: Patient has requested telehealth visit and Services only offered telehealth    Originating Site (Patient Location): Patient's home    Distant Site (Provider Location): Provider Remote Setting    Consent:  The patient/guardian has verbally consented to: the potential risks and benefits of telemedicine (video visit) versus in person care; bill my insurance or make self-payment for services provided; and responsibility for payment of non-covered services.      Treatment Plan Last Reviewed: 6/5/2020  PHQ-9 / SHARIF-7 : n/a    DATA  Interactive Complexity: No  Crisis: No       Progress Since Last Session (Related to Symptoms / Goals / Homework):   Symptoms: No change -- Mood overall is stable    Homework: Partially completed      Episode of Care Goals: Minimal progress - PREPARATION (Decided to change - considering how); Intervened by negotiating a change plan and determining options / strategies for behavior change, identifying triggers, exploring social supports, and working towards setting a date to begin behavior change     Current / Ongoing Stressors and Concerns:   Discussion around Dx and Treatment Planning     Treatment Objective(s) Addressed in This Session:    Patient will identify 1-2 strategies to more effectively address stressors   Patient will compile a list of boundaries that they would like to set with others.    Patient will identify pros and cons for setting and no setting these boundaries.     Intervention:   Motivational Interviewing: Discussion around  areas of change that are importance in addressing current stressors, supporting actions that patient is currently taking, emphasizing personal choice and control  Motivational Interviewing    MI Intervention: Expressed Empathy/Understanding, Supported Autonomy, Collaboration, Evocation, Permission to raise concern or advise, Open-ended questions and Reflections: simple and complex     Change Talk Expressed by the Patient: Desire to change Ability to change Reasons to change Need to change Committment to change Activation    Provider Response to Change Talk: E - Evoked more info from patient about behavior change, A - Affirmed patient's thoughts, decisions, or attempts at behavior change, R - Reflected patient's change talk and S - Summarized patient's change talk statements          ASSESSMENT: Current Emotional / Mental Status (status of significant symptoms):   Risk status (Self / Other harm or suicidal ideation)   Patient denies current fears or concerns for personal safety.   Patient denies current or recent suicidal ideation or behaviors.   Patient denies current or recent homicidal ideation or behaviors.   Patient denies current or recent self injurious behavior or ideation.   Patient denies other safety concerns.   Patient reports there has been no change in risk factors since their last session.     Patient reports there has been no change in protective factors since their last session.     Recommended that patient call 911 or go to the local ED should there be a change in any of these risk factors.     Appearance:   Appropriate    Eye Contact:   Good    Psychomotor Behavior: Normal    Attitude:   Cooperative    Orientation:   All   Speech    Rate / Production: Normal     Volume:  Normal    Mood:    Euthymic   Affect:    Restricted    Thought Content:  Clear    Thought Form:  Coherent  Goal Directed  Logical    Insight:    Good      Medication Review:   No current psychiatric medications  prescribed     Medication Compliance:   NA     Changes in Health Issues:   None reported     Chemical Use Review:   Substance Use: Problem use continues with no change since last session, Stage of Change: Contemplation        Tobacco Use: No current tobacco use.      Diagnosis:  1. Adjustment disorder, unspecified type        Collateral Reports Completed:   Not Applicable    PLAN: (Patient Tasks / Therapist Tasks / Other)  Patient: Take time this week to focus on self-care.         GIUSEPPE Vanegas [facility]    June 5, 2020   Note reviewed and clinical supervision by GIUSEPPE Potts [facility] 6/9/2020  ______________________________________________________________________    Treatment Plan    Patient's Name: Jeanmarie Mckinley  YOB: 1975    Date: 6/5/2020    DSM5 Diagnoses: Adjustment Disorders  309.9 (F43.20) Unspecified  Psychosocial / Contextual Factors: Family hx of substance use and mental health, process of divorce, coparenting, business owner  WHODAS:   WHODAS 2.0 Total Score 5/14/2020   Total Score 18         Referral / Collaboration:  Referral to another professional/service is not indicated at this time..    Anticipated number of session or this episode of care: 12      MeasurableTreatment Goal(s) related to diagnosis / functional impairment(s)  Goal 1: Patient will identify strategies to address adjustment in changes in relationship and parenting.     Objective #A (Patient Action)    Patient will compile a list of boundaries that they would like to set with others. Patient will identify pros and cons for setting and no setting these boundaries.  Status: New - Date: 6/5/2020     Intervention(s)  Therapist will teach about healthy boundaries.     Objective #B  Patient will identify 1-2 strategies to more effectively address stressors.  Status: New - Date: 6/5/2020     Intervention(s)  Therapist will teach components of Interpersonal Effectiveness Skills.       Patient has reviewed and agreed to the  above plan.      GIUSEPPE Vanegas [facility]    June 5, 2020  Treatment plan reviewed and clinical supervision by GIUSEPPE Potts [facility] 6/9/2020

## 2020-06-19 ENCOUNTER — VIRTUAL VISIT (OUTPATIENT)
Dept: PSYCHOLOGY | Facility: CLINIC | Age: 45
End: 2020-06-19
Payer: COMMERCIAL

## 2020-06-19 DIAGNOSIS — F43.20 ADJUSTMENT DISORDER, UNSPECIFIED TYPE: Primary | ICD-10-CM

## 2020-06-19 PROCEDURE — 90834 PSYTX W PT 45 MINUTES: CPT | Mod: 95 | Performed by: SOCIAL WORKER

## 2020-06-19 NOTE — PROGRESS NOTES
Progress Note    Patient Name: Jeanmarie Mckinley  Date: 6/19/2020         Service Type: Individual      Session Start Time: 8:03 AM  Session End Time: 8:53 AM     Session Length: 50 mins    Session #: 4    Attendees: Client attended alone    Telemedicine Visit: The patient's condition can be safely assessed and treated via synchronous audio and visual telemedicine encounter.      Reason for Telemedicine Visit: Patient has requested telehealth visit and Services only offered telehealth    Originating Site (Patient Location): Patient's home    Distant Site (Provider Location): Provider Remote Setting    Consent:  The patient/guardian has verbally consented to: the potential risks and benefits of telemedicine (video visit) versus in person care; bill my insurance or make self-payment for services provided; and responsibility for payment of non-covered services.      Mode of Communication:  Video Conference via [employer]        As the provider I attest to compliance with applicable laws and regulations related to telemedicine.      Treatment Plan Last Reviewed: 6/5/2020  PHQ-9 / SHARIF-7 : n/a    DATA  Interactive Complexity: No  Crisis: No       Progress Since Last Session (Related to Symptoms / Goals / Homework):   Symptoms: No change -- Mood overall is stable    Homework: Partially completed      Episode of Care Goals: Minimal progress - PREPARATION (Decided to change - considering how); Intervened by negotiating a change plan and determining options / strategies for behavior change, identifying triggers, exploring social supports, and working towards setting a date to begin behavior change     Current / Ongoing Stressors and Concerns:   Ongoing Stress: Managing changes with divorce and coparenting     Current Stress: Identifying appropriate boundaries with ex-wife, starting with reducing time spent with children together.     Treatment Objective(s) Addressed in This  Session:    Patient will identify 1-2 strategies to more effectively address stressors   Patient will compile a list of boundaries that they would like to set with others.    Patient will identify pros and cons for setting and no setting these boundaries.     Intervention:  CBT: Patient talked about challenging conversations he's had with ex-wife and not feel heard and appreciated for efforts he has put into parenting and marriage. We discussed different ways to acknowledge ex-wife's expression of frustration and not being valued in the relationship and re-direct conversation back to what is best for the children.    Motivational Interviewing: Discussion around areas of change that are importance in addressing current stressors, supporting actions that patient is currently taking, emphasizing personal choice and control, discussion around barriers that get in the way of emotional connection for patient.   Motivational Interviewing    MI Intervention: Expressed Empathy/Understanding, Supported Autonomy, Collaboration, Evocation, Permission to raise concern or advise, Open-ended questions and Reflections: simple and complex     Change Talk Expressed by the Patient: Desire to change Ability to change Reasons to change Need to change Committment to change Activation    Provider Response to Change Talk: E - Evoked more info from patient about behavior change, A - Affirmed patient's thoughts, decisions, or attempts at behavior change, R - Reflected patient's change talk and S - Summarized patient's change talk statements          ASSESSMENT: Current Emotional / Mental Status (status of significant symptoms):   Risk status (Self / Other harm or suicidal ideation)   Patient denies current fears or concerns for personal safety.   Patient denies current or recent suicidal ideation or behaviors.   Patient denies current or recent homicidal ideation or behaviors.   Patient denies current or recent self injurious behavior or  ideation.   Patient denies other safety concerns.   Patient reports there has been no change in risk factors since their last session.     Patient reports there has been no change in protective factors since their last session.     Recommended that patient call 911 or go to the local ED should there be a change in any of these risk factors.     Appearance:   Appropriate    Eye Contact:   Good    Psychomotor Behavior: Normal    Attitude:   Cooperative    Orientation:   All   Speech    Rate / Production: Normal     Volume:  Normal    Mood:    Euthymic   Affect:    Restricted    Thought Content:  Clear    Thought Form:  Coherent  Goal Directed  Logical    Insight:    Good      Medication Review:   No current psychiatric medications prescribed     Medication Compliance:   NA     Changes in Health Issues:   None reported     Chemical Use Review:   Substance Use: Problem use continues with no change since last session, Stage of Change: Contemplation        Tobacco Use: No current tobacco use.      Diagnosis:  1. Adjustment disorder, unspecified type        Collateral Reports Completed:   Not Applicable    PLAN: (Patient Tasks / Therapist Tasks / Other)  Patient: Will practice reframing statements made by ex-wife and re-direct conversations back to roles as parents.       GIUSEPPE Vanegas [facility]    June 19, 2020  Note reviewed and clinical supervision by GIUSEPPE Potts [facility] 6/27/2020  ______________________________________________________________________    Treatment Plan    Patient's Name: Jeanmarie Mckinley  YOB: 1975    Date: 6/5/2020    DSM5 Diagnoses: Adjustment Disorders  309.9 (F43.20) Unspecified  Psychosocial / Contextual Factors: Family hx of substance use and mental health, process of divorce, coparenting, business owner  WHODAS:   WHODAS 2.0 Total Score 5/14/2020   Total Score 18         Referral / Collaboration:  Referral to another professional/service is not indicated at this  time..    Anticipated number of session or this episode of care: 12      MeasurableTreatment Goal(s) related to diagnosis / functional impairment(s)  Goal 1: Patient will identify strategies to address adjustment in changes in relationship and parenting.     Objective #A (Patient Action)    Patient will compile a list of boundaries that they would like to set with others. Patient will identify pros and cons for setting and no setting these boundaries.  Status: New - Date: 6/5/2020     Intervention(s)  Therapist will teach about healthy boundaries.     Objective #B  Patient will identify 1-2 strategies to more effectively address stressors.  Status: New - Date: 6/5/2020     Intervention(s)  Therapist will teach components of Interpersonal Effectiveness Skills.       Patient has reviewed and agreed to the above plan.      GIUSEPPE Vanegas [facility]    June 5, 2020  Treatment plan reviewed and clinical supervision by GIUSEPPE Potts [facility] 6/9/2020

## 2020-06-25 ENCOUNTER — TELEPHONE (OUTPATIENT)
Dept: PHARMACY | Facility: CLINIC | Age: 45
End: 2020-06-25

## 2020-06-25 NOTE — TELEPHONE ENCOUNTER
This patient is due for MTM follow-up. I called the patient to schedule an appointment. Appointment scheduled for 7/2/20.      Jennifer Han, PharmD  PGY1 Medication Therapy Management Resident   [phone]

## 2020-07-02 ENCOUNTER — VIRTUAL VISIT (OUTPATIENT)
Dept: PHARMACY | Facility: CLINIC | Age: 45
End: 2020-07-02
Payer: COMMERCIAL

## 2020-07-02 DIAGNOSIS — L30.9 ECZEMA, UNSPECIFIED TYPE: ICD-10-CM

## 2020-07-02 DIAGNOSIS — E78.2 MIXED HYPERLIPIDEMIA: ICD-10-CM

## 2020-07-02 DIAGNOSIS — I10 BENIGN ESSENTIAL HYPERTENSION: ICD-10-CM

## 2020-07-02 DIAGNOSIS — J45.998 ASTHMA, PERSISTENT CONTROLLED: Primary | ICD-10-CM

## 2020-07-02 DIAGNOSIS — E11.9 CONTROLLED TYPE 2 DIABETES MELLITUS WITHOUT COMPLICATION, WITHOUT LONG-TERM CURRENT USE OF INSULIN (H): ICD-10-CM

## 2020-07-02 PROCEDURE — 99607 MTMS BY PHARM ADDL 15 MIN: CPT | Performed by: PHARMACIST

## 2020-07-02 PROCEDURE — 99605 MTMS BY PHARM NP 15 MIN: CPT | Performed by: PHARMACIST

## 2020-07-02 NOTE — PROGRESS NOTES
"MTM ENCOUNTER  SUBJECTIVE/OBJECTIVE:                           Jeanmarie Mckinley is a 44 year old male contacted via secure video for a follow-up visit. He was referred to me from Dr. Juares.  Today's visit is a follow-up MTM visit from October 2019.      Patient consented to a telehealth visit: yes  Telemedicine Visit Details  Type of service:  Video visit   Start Time: 8:38 AM  End Time: 8:55 AM  Originating Location (pt. Location): Home  Distant Location (provider location):  [facility] [place]  Mode of Communication:  Video Conference via [employer]    Chief Complaint: Never received Dulera inhaler.       Allergies/ADRs: Reviewed in EHR  Tobacco:  reports that he has never smoked. He has never used smokeless tobacco.  Alcohol: Less than 1 beverages / week    Medication Adherence/Access: Does fine with oral meds, but misses second dose of Advair. Takes lisinopril and Victoza in the AM, Simvastatin and metformin at night.     Diabetes:  Pt currently taking metformin ER 500mg - 4 tablets once daily and Victoza 1.2mg daily. Pt is not experiencing side effects. .   SMBG: Minimum of once a week he checks 3 times a day, tries to do this 2-3 times per week.   Has an accu-chek meter now (insurance switched brands). Working well for him. Ranges (patient reported): Fasting usually around 140, and is usually his highest reading of the day.   Patient is not experiencing hypoglycemia or hyperglycemia sx.   Eye exam: up to date  Foot exam: up to date  Aspirin: Not taking due to age  Diet/Exercise: 45 minutes daily of exercise now that he's not commuting.     Asthma: Current asthma medications:Fluticasone+Salmeterol (Advair) 100-50mg/dose and Ventolin PRN (only used once in last month). Reports Dr. Juares switched him to Dulera because he \"had a little rattle\" during their April Video visit, however the Dulera never came from mail order pharmacy. So he has continued on Advair.   Asthma triggers include: upper respiratory " infections and exercise or sports.   Pt reports the following symptoms: none.     Hypertension: Current medications include lisinopril 10 mg daily.  Patient does not self-monitor BP.  Patient reports no current medication side effects.    Hyperlipidemia: Current therapy includes Simvastatin 40mg once daily.  Pt reports no significant myalgias or other side effects.    Skin: Has not had any issues with eczema this summer, but has triamcinolone in case he does have issues.     ASSESSMENT:                            Medication Adherence: needs improvement - see below    Diabetes:  Due for labs. From his report, his fasting blood sugars are a little higher than we would like. Will await an A1c.     Asthma: Would be helpful to see if we could get him Breo Ellipta as it's once a day and may improve compliance. I suspect the reason that he was not realizing the benefit of Advair was because of difficulty with the second daily dose, which would also be a problem with symbicort.     Hypertension: Due for labs, otherwise stable    Hyperlipidemia: Due for labs, otherwise stable    Skin: Stable    PLAN:                            1. Encouraged pt to stop in for labs (future orders are pended by Dr. Juares from April)  2. Will follow-up with what happened with Dulera and also see if his insurance will cover Breo.     I spent 17 minutes with this patient today. All changes were made via collaborative practice agreement with Dr. Juares. A copy of the visit note was provided to the patient's primary care provider.    Will follow up with answers about inhaler coverage above and when labs return.    The patient was sent via [employer] a summary of these recommendations.     Faith Montague, Hans, VITO, BCACP  MTM Pharmacist, [facility]    Addendum: Able to review pts insurance formulary online - Dulera is not covered.  Will switch to Breo as it is once a day. Will start at higher dose and decrease if able.

## 2020-07-02 NOTE — PATIENT INSTRUCTIONS
Recommendations from today's MTM visit:                                                    MTM (medication therapy management) is a service provided by a clinical pharmacist designed to help you get the most of out of your medicines.   Today we reviewed what your medicines are for, how to know if they are working, that your medicines are safe and how to make your medicine regimen as easy as possible.     1. Call the clinic to make a lab-only appointment. You are due for fasting labs and a urine test.  For fasting labs - nothing to eat/drink for 8-12 hours before the test except for water, medications and black coffee.  Make sure you are well hydrated so you can leave the urine sample.     2. I looked into your insurance and found out that Dulera is not covered, so that is likely why you never received it.  However, your insurance does cover Breo, which is that once a day inhaler that is very similar to Advair (it has nearly the same ingredients, just different enough to stay brand name now that Advair has some generic versions out).  I sent a prescription for Breo 200/25mg to your mail service pharmacy. When you get it, you just need to take one puff from the inhaler (similar to the diskus) once a day.  Remember to rinse your mouth out after the inhalation.  If you have any questions, please let me know how I can help!     It was great to speak with you today.  I value your experience and would be very thankful for your time with providing feedback on our clinic survey. You may receive a survey via email or text message in the next few days.     Next MTM visit: I'll check in with you in a few weeks to make sure you got the new inhaler.     To schedule another MTM appointment, please call the clinic directly or you may call the MTM scheduling line at [phone] or toll-free at [phone].     My Clinical Pharmacist's contact information:                                                      It was a pleasure  talking with you today!  Please feel free to contact me with any questions or concerns you have.      Faith Montague, Pharm.D., M.B.A., BCACP  [facility] Pharmacist, [facility]  Pager: [phone]  Email: [email or web address]

## 2020-07-03 ENCOUNTER — VIRTUAL VISIT (OUTPATIENT)
Dept: PSYCHOLOGY | Facility: CLINIC | Age: 45
End: 2020-07-03
Payer: COMMERCIAL

## 2020-07-03 DIAGNOSIS — F43.20 ADJUSTMENT DISORDER, UNSPECIFIED TYPE: Primary | ICD-10-CM

## 2020-07-03 PROCEDURE — 90834 PSYTX W PT 45 MINUTES: CPT | Mod: 95 | Performed by: SOCIAL WORKER

## 2020-07-03 NOTE — PROGRESS NOTES
Progress Note    Patient Name: Eboni Mckinley  Date: 7/3/2020         Service Type: Individual      Session Start Time: 8:02 AM  Session End Time: 8:47 AM     Session Length: 45 mins    Session #: 5    Attendees: Client attended alone    Service Modality:  Video Visit:    Telemedicine Visit: The patient's condition can be safely assessed and treated via synchronous audio and visual telemedicine encounter.      Reason for Telemedicine Visit: Patient has requested telehealth visit    Originating Site (Patient Location): Patient's home    Distant Site (Provider Location): Provider Remote Setting    Consent:  The patient/guardian has verbally consented to: the potential risks and benefits of telemedicine (video visit) versus in person care; bill my insurance or make self-payment for services provided; and responsibility for payment of non-covered services.     Patient would like the video invitation sent by: Send to e-mail at: [email or web address]     Mode of Communication:  Video Conference via Amwell    As the provider I attest to compliance with applicable laws and regulations related to telemedicine.      Treatment Plan Last Reviewed: 6/5/2020  PHQ-9 / SHARIF-7 : n/a    DATA  Interactive Complexity: No  Crisis: No       Progress Since Last Session (Related to Symptoms / Goals / Homework):   Symptoms: Improving -- mood is stable    Homework: Partially completed-Will practice reframing statements made by ex-wife and re-direct conversations back to roles as parents.      Episode of Care Goals: Minimal progress - PREPARATION (Decided to change - considering how); Intervened by negotiating a change plan and determining options / strategies for behavior change, identifying triggers, exploring social supports, and working towards setting a date to begin behavior change     Current / Ongoing Stressors and Concerns:   Ongoing Stress: Managing changes with divorce and  coparenting     Current Stress: Continuing to set boundaries, exploring when would be appropriate to introduce new partner to children     Treatment Objective(s) Addressed in This Session:    Patient will identify 1-2 strategies to more effectively address stressors   Patient will compile a list of boundaries that they would like to set with others.    Patient will identify pros and cons for setting and no setting these boundaries.     Intervention:  CBT: We discussed some of the conversations patient has had with ex-wife and children around eliminating events/activities done together and their reactions, along with how it was like for patient to set these boundaries for himself and ex-wife. He explained concerns with hurting ex-wife feelings, yet appreciated that she understood reasons behind it.    Motivational Interviewing: Reflected on the challenges of having difficult conversations with his children around the divorce and the idea of him and ex-wife moving forward with new relationships, affirming steps patient is already taking with weighing pros and cons and giving things more time, emphasizing personal choice and control  Motivational Interviewing    MI Intervention: Expressed Empathy/Understanding, Supported Autonomy, Collaboration, Evocation, Permission to raise concern or advise, Open-ended questions, Reflections: simple and complex, Change talk (evoked) and Reframe     Change Talk Expressed by the Patient: Desire to change Ability to change Reasons to change Need to change Committment to change Activation    Provider Response to Change Talk: E - Evoked more info from patient about behavior change, A - Affirmed patient's thoughts, decisions, or attempts at behavior change, R - Reflected patient's change talk and S - Summarized patient's change talk statements          ASSESSMENT: Current Emotional / Mental Status (status of significant symptoms):   Risk status (Self / Other harm or suicidal  ideation)   Patient denies current fears or concerns for personal safety.   Patient denies current or recent suicidal ideation or behaviors.   Patient denies current or recent homicidal ideation or behaviors.   Patient denies current or recent self injurious behavior or ideation.   Patient denies other safety concerns.   Patient reports there has been no change in risk factors since their last session.     Patient reports there has been no change in protective factors since their last session.     Recommended that patient call 911 or go to the local ED should there be a change in any of these risk factors.     Appearance:   Appropriate    Eye Contact:   Good    Psychomotor Behavior: Normal    Attitude:   Cooperative    Orientation:   All   Speech    Rate / Production: Normal     Volume:  Normal    Mood:    Euthymic   Affect:    Restricted    Thought Content:  Clear    Thought Form:  Coherent  Goal Directed  Logical    Insight:    Good      Medication Review:   No current psychiatric medications prescribed     Medication Compliance:   NA     Changes in Health Issues:   None reported     Chemical Use Review:   Substance Use: Problem use continues with no change since last session, Stage of Change: Contemplation        Tobacco Use: No current tobacco use.      Diagnosis:  1. Adjustment disorder, unspecified type        Collateral Reports Completed:   Not Applicable    PLAN: (Patient Tasks / Therapist Tasks / Other)  Patient:  Patient will make time to have open conversations with children around how they are feeling with the divorce.       GIUSEPPE Vanegas [facility]    July 3,2020  Note reviewed and clinical supervision by GIUSEPPE Potts [facility] 7/11/2020        ______________________________________________________________________    Treatment Plan    Patient's Name: Jeanmarie Mckinley  YOB: 1975    Date: 6/5/2020    DSM5 Diagnoses: Adjustment Disorders  309.9 (F43.20) Unspecified  Psychosocial /  Contextual Factors: Family hx of substance use and mental health, process of divorce, coparenting, business owner  WHODAS:   WHODAS 2.0 Total Score 5/14/2020   Total Score 18         Referral / Collaboration:  Referral to another professional/service is not indicated at this time..    Anticipated number of session or this episode of care: 12      MeasurableTreatment Goal(s) related to diagnosis / functional impairment(s)  Goal 1: Patient will identify strategies to address adjustment in changes in relationship and parenting.     Objective #A (Patient Action)    Patient will compile a list of boundaries that they would like to set with others. Patient will identify pros and cons for setting and no setting these boundaries.  Status: New - Date: 6/5/2020     Intervention(s)  Therapist will teach about healthy boundaries.     Objective #B  Patient will identify 1-2 strategies to more effectively address stressors.  Status: New - Date: 6/5/2020     Intervention(s)  Therapist will teach components of Interpersonal Effectiveness Skills.       Patient has reviewed and agreed to the above plan.      GIUSEPPE Vanegas [facility]    June 5, 2020  Treatment plan reviewed and clinical supervision by GIUSEPPE Potts [facility] 6/9/2020

## 2020-07-15 DIAGNOSIS — E11.65 TYPE 2 DIABETES MELLITUS WITH HYPERGLYCEMIA, WITHOUT LONG-TERM CURRENT USE OF INSULIN (H): ICD-10-CM

## 2020-07-15 RX ORDER — LIRAGLUTIDE 6 MG/ML
INJECTION SUBCUTANEOUS
Qty: 18 ML | Refills: 0 | Status: SHIPPED | OUTPATIENT
Start: 2020-07-15 | End: 2020-07-16

## 2020-07-16 ENCOUNTER — VIRTUAL VISIT (OUTPATIENT)
Dept: PSYCHOLOGY | Facility: CLINIC | Age: 45
End: 2020-07-16
Payer: COMMERCIAL

## 2020-07-16 DIAGNOSIS — E11.9 CONTROLLED TYPE 2 DIABETES MELLITUS WITHOUT COMPLICATION, WITHOUT LONG-TERM CURRENT USE OF INSULIN (H): ICD-10-CM

## 2020-07-16 DIAGNOSIS — E11.65 TYPE 2 DIABETES MELLITUS WITH HYPERGLYCEMIA, WITHOUT LONG-TERM CURRENT USE OF INSULIN (H): ICD-10-CM

## 2020-07-16 DIAGNOSIS — F43.20 ADJUSTMENT DISORDER, UNSPECIFIED TYPE: Primary | ICD-10-CM

## 2020-07-16 PROCEDURE — 90834 PSYTX W PT 45 MINUTES: CPT | Mod: 95 | Performed by: SOCIAL WORKER

## 2020-07-16 RX ORDER — LIRAGLUTIDE 6 MG/ML
INJECTION SUBCUTANEOUS
Qty: 18 ML | Refills: 1 | Status: SHIPPED | OUTPATIENT
Start: 2020-07-16 | End: 2020-08-19

## 2020-07-16 RX ORDER — METFORMIN HCL 500 MG
TABLET, EXTENDED RELEASE 24 HR ORAL
Qty: 360 TABLET | Refills: 1 | Status: SHIPPED | OUTPATIENT
Start: 2020-07-16 | End: 2020-08-19

## 2020-07-16 NOTE — TELEPHONE ENCOUNTER
See old EadBox[employer] message 7/2/2020 - pt requesting refills on victoza and metformin.  Victoza sent in yesterday, but sent to wrong pharmacy, sent to mail service. Sent in metformin as well. Pt has plan to get labs in early August.     Faith Montague, PharmD, VITO, BCACP  MTM Pharmacist, [facility]

## 2020-07-16 NOTE — PROGRESS NOTES
Progress Note    Patient Name: Eboni Mckinley  Date: 7/16/2020         Service Type: Individual      Session Start Time: 8:04 AM  Session End Time: 8:44 AM     Session Length: 40 mins    Session #: 6    Attendees: Client attended alone    Service Modality:  Video Visit:    Telemedicine Visit: The patient's condition can be safely assessed and treated via synchronous audio and visual telemedicine encounter.      Reason for Telemedicine Visit: Patient has requested telehealth visit    Originating Site (Patient Location): Patient's home    Distant Site (Provider Location): Provider Remote Setting    Consent:  The patient/guardian has verbally consented to: the potential risks and benefits of telemedicine (video visit) versus in person care; bill my insurance or make self-payment for services provided; and responsibility for payment of non-covered services.     Patient would like the video invitation sent by: Send to e-mail at: [email or web address] / [email or web address]    Mode of Communication:  Video Conference via Amwell    As the provider I attest to compliance with applicable laws and regulations related to telemedicine.      Treatment Plan Last Reviewed: 6/5/2020  PHQ-9 / SHARIF-7 : n/a    DATA  Interactive Complexity: No  Crisis: No       Progress Since Last Session (Related to Symptoms / Goals / Homework):   Symptoms: Improving -- decrease in anxiety, improvement in mood    Homework: Partially completed-Will practice reframing statements made by ex-wife and re-direct conversations back to roles as parents.      Episode of Care Goals: Minimal progress - PREPARATION (Decided to change - considering how); Intervened by negotiating a change plan and determining options / strategies for behavior change, identifying triggers, exploring social supports, and working towards setting a date to begin behavior change     Current / Ongoing Stressors and Concerns:   Ongoing Stress: Managing changes  "with divorce and coparenting     Current Stress: Frustration with mother and sister, questioning need to set boundaries with them about  ex-wife as spouse of patient.      Treatment Objective(s) Addressed in This Session:    Patient will identify 1-2 strategies to more effectively address stressors   Patient will compile a list of boundaries that they would like to set with others.    Patient will identify pros and cons for setting and no setting these boundaries.     Intervention:  CBT: Patient talked about feeling uneasy about ex-spouse reaching out for support from his family. We discussed patient feeling that family members aren't \"on my side,\" and how that disturbs the relationship. We explored ways to have this conversation with family members and understand actions that were taken and reasons behind them.   Motivational Interviewing: Affirming steps patient continues to take in setting boundaries with ex-wife, talk to his children about how they are doing emotionally with the divorce and emphasizing personal choice and control.   Motivational Interviewing    MI Intervention: Expressed Empathy/Understanding, Supported Autonomy, Collaboration, Evocation, Permission to raise concern or advise, Open-ended questions, Reflections: simple and complex, Change talk (evoked) and Reframe     Change Talk Expressed by the Patient: Desire to change Ability to change Reasons to change Need to change Committment to change Activation    Provider Response to Change Talk: E - Evoked more info from patient about behavior change, A - Affirmed patient's thoughts, decisions, or attempts at behavior change, R - Reflected patient's change talk and S - Summarized patient's change talk statements          ASSESSMENT: Current Emotional / Mental Status (status of significant symptoms):   Risk status (Self / Other harm or suicidal ideation)   Patient denies current fears or concerns for personal safety.   Patient denies current " or recent suicidal ideation or behaviors.   Patient denies current or recent homicidal ideation or behaviors.   Patient denies current or recent self injurious behavior or ideation.   Patient denies other safety concerns.   Patient reports there has been no change in risk factors since their last session.     Patient reports there has been no change in protective factors since their last session.     Recommended that patient call 911 or go to the local ED should there be a change in any of these risk factors.     Appearance:   Appropriate    Eye Contact:   Good    Psychomotor Behavior: Normal    Attitude:   Cooperative    Orientation:   All   Speech    Rate / Production: Normal     Volume:  Normal    Mood:    Euthymic   Affect:    Restricted    Thought Content:  Clear    Thought Form:  Coherent  Goal Directed  Logical    Insight:    Good      Medication Review:   No current psychiatric medications prescribed     Medication Compliance:   NA     Changes in Health Issues:   None reported     Chemical Use Review:   Substance Use: Problem use continues with no change since last session, Stage of Change: Contemplation        Tobacco Use: No current tobacco use.      Diagnosis:  1. Adjustment disorder, unspecified type        Collateral Reports Completed:   Not Applicable    PLAN: (Patient Tasks / Therapist Tasks / Other)  Patient:  Patient will make time to have open conversations with children around how they are feeling with the divorce.    Patient will also make time to converse with family members around how they are approaching the conversation about the divorce with ex-wife.       GIUSEPPE Vanegas [facility]    July 16, 2020  Note reviewed and clinical supervision by GIUSEPPE Potts [facility] 7/21/2020  ______________________________________________________________________    Treatment Plan    Patient's Name: Jeanmarie Mckinley  YOB: 1975    Date: 6/5/2020    DSM5 Diagnoses: Adjustment Disorders  309.9  (F43.20) Unspecified  Psychosocial / Contextual Factors: Family hx of substance use and mental health, process of divorce, coparenting, business owner  WHODAS:   WHODAS 2.0 Total Score 5/14/2020   Total Score 18         Referral / Collaboration:  Referral to another professional/service is not indicated at this time..    Anticipated number of session or this episode of care: 12      MeasurableTreatment Goal(s) related to diagnosis / functional impairment(s)  Goal 1: Patient will identify strategies to address adjustment in changes in relationship and parenting.     Objective #A (Patient Action)    Patient will compile a list of boundaries that they would like to set with others. Patient will identify pros and cons for setting and no setting these boundaries.  Status: New - Date: 6/5/2020     Intervention(s)  Therapist will teach about healthy boundaries.     Objective #B  Patient will identify 1-2 strategies to more effectively address stressors.  Status: New - Date: 6/5/2020     Intervention(s)  Therapist will teach components of Interpersonal Effectiveness Skills.       Patient has reviewed and agreed to the above plan.      GIUSEPPE Vanegas [facility]    June 5, 2020  Treatment plan reviewed and clinical supervision by GIUSEPPE Potts [facility] 6/9/2020

## 2020-07-31 ENCOUNTER — VIRTUAL VISIT (OUTPATIENT)
Dept: PSYCHOLOGY | Facility: CLINIC | Age: 45
End: 2020-07-31
Payer: COMMERCIAL

## 2020-07-31 DIAGNOSIS — F43.20 ADJUSTMENT DISORDER, UNSPECIFIED TYPE: Primary | ICD-10-CM

## 2020-07-31 PROCEDURE — 90834 PSYTX W PT 45 MINUTES: CPT | Mod: 95 | Performed by: SOCIAL WORKER

## 2020-07-31 NOTE — PROGRESS NOTES
"                                           Progress Note    Patient Name: Jeanmarie Mckinley  Date: 7/31/2020         Service Type: Individual      Session Start Time: 8:04 AM  Session End Time: 8:54 AM     Session Length: 50 mins    Session #: 7    Attendees: Client attended alone    The patient has been notified of the following:      \"We have found that certain health care needs can be provided without the need for a face to face visit.  This service lets us provide the care you need with a phone conversation.       I will have full access to your [place] medical record during this entire phone call.   I will be taking notes for your medical record.      Since this is like an office visit, we will bill your insurance company for this service.       There are potential benefits and risks of telephone visits (e.g. limits to patient confidentiality) that differ from in-person visits.?  Confidentiality still applies for telephone services, and nobody will record the visit.  It is important to be in a quiet, private space that is free of distractions (including cell phone or other devices) during the visit.??      If during the course of the call I believe a telephone visit is not appropriate, you will not be charged for this service\"     Consent has been obtained for this service by care team member: Yes       Treatment Plan Last Reviewed: 6/5/2020  PHQ-9 / SHARIF-7 : n/a    DATA  Interactive Complexity: No  Crisis: No       Progress Since Last Session (Related to Symptoms / Goals / Homework):   Symptoms: Improving -- decrease in anxiety, improvement in mood    Homework: Partially completed-Will practice reframing statements made by ex-wife and re-direct conversations back to roles as parents.      Episode of Care Goals: Minimal progress - PREPARATION (Decided to change - considering how); Intervened by negotiating a change plan and determining options / strategies for behavior change, identifying triggers, exploring " social supports, and working towards setting a date to begin behavior change     Current / Ongoing Stressors and Concerns:   Ongoing Stress: Managing changes with divorce and coparenting     Current Stress: Conversation with mother and being stuck in the middle, setting boundaries with ex-wife.     Treatment Objective(s) Addressed in This Session:    Patient will identify 1-2 strategies to more effectively address stressors   Patient will compile a list of boundaries that they would like to set with others.    Patient will identify pros and cons for setting and no setting these boundaries.     Intervention:  CBT: Patient was able to gain some insight around how mom was feeling when ex-wife would reach out to her and feeling stuck. We talked about setting boundaries with ex-wife when utilizing his family for her emotional support and explored ways we can have this conversation without building bitterness between them or their relationship with other family members.   Motivational Interviewing: Affirming steps patient continues to take in setting boundaries with ex-wife, talk to his family about how they are doing emotionally with the divorce and emphasizing personal choice and control.   Motivational Interviewing    MI Intervention: Expressed Empathy/Understanding, Supported Autonomy, Collaboration, Evocation, Permission to raise concern or advise, Open-ended questions, Reflections: simple and complex, Change talk (evoked) and Reframe     Change Talk Expressed by the Patient: Desire to change Ability to change Reasons to change Need to change Committment to change Activation    Provider Response to Change Talk: E - Evoked more info from patient about behavior change, A - Affirmed patient's thoughts, decisions, or attempts at behavior change, R - Reflected patient's change talk and S - Summarized patient's change talk statements          ASSESSMENT: Current Emotional / Mental Status (status of significant  symptoms):   Risk status (Self / Other harm or suicidal ideation)   Patient denies current fears or concerns for personal safety.   Patient denies current or recent suicidal ideation or behaviors.   Patient denies current or recent homicidal ideation or behaviors.   Patient denies current or recent self injurious behavior or ideation.   Patient denies other safety concerns.   Patient reports there has been no change in risk factors since their last session.     Patient reports there has been no change in protective factors since their last session.     Recommended that patient call 911 or go to the local ED should there be a change in any of these risk factors.     Appearance:   Unable to assess over phone    Eye Contact:   Unable to assess over phone    Psychomotor Behavior: Unable to assess over phone    Attitude:   Cooperative  Friendly Pleasant   Orientation:   All   Speech    Rate / Production: Normal     Volume:  Normal    Mood:    Euthymic   Affect:    Unable to assess over phone    Thought Content:  Clear    Thought Form:  Coherent  Goal Directed  Logical    Insight:    Good      Medication Review:   No current psychiatric medications prescribed     Medication Compliance:   NA     Changes in Health Issues:   None reported     Chemical Use Review:   Substance Use: Problem use continues with no change since last session, Stage of Change: Contemplation        Tobacco Use: No current tobacco use.      Diagnosis:  1. Adjustment disorder, unspecified type        Collateral Reports Completed:   Not Applicable    PLAN: (Patient Tasks / Therapist Tasks / Other)  Patient:  Talk with family members and ex-wife around keeping divorce matters between him and ex-wife      GIUSEPPE Vanegas [facility]    July 31, 2020  Note reviewed and clinical supervision by GIUSEPPE Potts [facility] 8/13/2020    ______________________________________________________________________    Treatment Plan    Patient's Name: Jeanmarie C Elías  Date  Of Birth: 1975    Date: 6/5/2020    DSM5 Diagnoses: Adjustment Disorders  309.9 (F43.20) Unspecified  Psychosocial / Contextual Factors: Family hx of substance use and mental health, process of divorce, coparenting, business owner  WHODAS:   WHODAS 2.0 Total Score 5/14/2020   Total Score 18         Referral / Collaboration:  Referral to another professional/service is not indicated at this time..    Anticipated number of session or this episode of care: 12      MeasurableTreatment Goal(s) related to diagnosis / functional impairment(s)  Goal 1: Patient will identify strategies to address adjustment in changes in relationship and parenting.     Objective #A (Patient Action)    Patient will compile a list of boundaries that they would like to set with others. Patient will identify pros and cons for setting and no setting these boundaries.  Status: New - Date: 6/5/2020     Intervention(s)  Therapist will teach about healthy boundaries.     Objective #B  Patient will identify 1-2 strategies to more effectively address stressors.  Status: New - Date: 6/5/2020     Intervention(s)  Therapist will teach components of Interpersonal Effectiveness Skills.       Patient has reviewed and agreed to the above plan.      GIUSEPPE Vanegas [facility]    June 5, 2020  Treatment plan reviewed and clinical supervision by GIUSEPPE Potts [facility] 6/9/2020

## 2020-08-11 DIAGNOSIS — E11.65 TYPE 2 DIABETES MELLITUS WITH HYPERGLYCEMIA, WITHOUT LONG-TERM CURRENT USE OF INSULIN (H): ICD-10-CM

## 2020-08-11 LAB
ANION GAP SERPL CALCULATED.3IONS-SCNC: 6 MMOL/L (ref 3–14)
BUN SERPL-MCNC: 11 MG/DL (ref 7–30)
CALCIUM SERPL-MCNC: 9.4 MG/DL (ref 8.5–10.1)
CHLORIDE SERPL-SCNC: 104 MMOL/L (ref 94–109)
CHOLEST SERPL-MCNC: 138 MG/DL
CO2 SERPL-SCNC: 25 MMOL/L (ref 20–32)
CREAT SERPL-MCNC: 1.12 MG/DL (ref 0.66–1.25)
CREAT UR-MCNC: 111 MG/DL
GFR SERPL CREATININE-BSD FRML MDRD: 79 ML/MIN/{1.73_M2}
GLUCOSE SERPL-MCNC: 152 MG/DL (ref 70–99)
HBA1C MFR BLD: 5.1 % (ref 0–5.6)
HDLC SERPL-MCNC: 52 MG/DL
LDLC SERPL CALC-MCNC: 70 MG/DL
MICROALBUMIN UR-MCNC: 49 MG/L
MICROALBUMIN/CREAT UR: 44.32 MG/G CR (ref 0–17)
NONHDLC SERPL-MCNC: 86 MG/DL
POTASSIUM SERPL-SCNC: 3.7 MMOL/L (ref 3.4–5.3)
SODIUM SERPL-SCNC: 135 MMOL/L (ref 133–144)
TRIGL SERPL-MCNC: 79 MG/DL

## 2020-08-11 PROCEDURE — 36415 COLL VENOUS BLD VENIPUNCTURE: CPT | Performed by: FAMILY MEDICINE

## 2020-08-11 PROCEDURE — 80048 BASIC METABOLIC PNL TOTAL CA: CPT | Performed by: FAMILY MEDICINE

## 2020-08-11 PROCEDURE — 80061 LIPID PANEL: CPT | Performed by: FAMILY MEDICINE

## 2020-08-11 PROCEDURE — 83036 HEMOGLOBIN GLYCOSYLATED A1C: CPT | Performed by: FAMILY MEDICINE

## 2020-08-11 PROCEDURE — 82043 UR ALBUMIN QUANTITATIVE: CPT | Performed by: FAMILY MEDICINE

## 2020-08-12 NOTE — RESULT ENCOUNTER NOTE
Hi    Labs look great for Diabetes .    Are you able to make a return office visit at [facility] for annual check up?  I do need to make sure Blood pressure is well- as you have more protein in your urine and kidney functions slightly lower.  In short- please make an office visit appointment as annual physical at [facility] in next 2 weeks    Fasting lipid is good.    Please keep us posted with questions or concerns .      Best Regards,    Krista Juares MD  [facility] [place]  [phone]

## 2020-08-14 ENCOUNTER — VIRTUAL VISIT (OUTPATIENT)
Dept: PSYCHOLOGY | Facility: CLINIC | Age: 45
End: 2020-08-14
Payer: COMMERCIAL

## 2020-08-14 DIAGNOSIS — F43.20 ADJUSTMENT DISORDER, UNSPECIFIED TYPE: Primary | ICD-10-CM

## 2020-08-14 PROCEDURE — 90834 PSYTX W PT 45 MINUTES: CPT | Mod: 95 | Performed by: SOCIAL WORKER

## 2020-08-14 NOTE — PROGRESS NOTES
Progress Note    Patient Name: Jeanmarie Mckinley  Date: 8/14/2020         Service Type: Individual      Session Start Time: 8:04 AM  Session End Time: 8:44 AM     Session Length: 40 mins    Session #: 8    Attendees: Client attended alone    Service Modality:  Video Visit:    Telemedicine Visit: The patient's condition can be safely assessed and treated via synchronous audio and visual telemedicine encounter.      Reason for Telemedicine Visit: Patient has requested telehealth visit    Originating Site (Patient Location): Patient's home    Distant Site (Provider Location): Provider Remote Setting    Consent:  The patient/guardian has verbally consented to: the potential risks and benefits of telemedicine (video visit) versus in person care; bill my insurance or make self-payment for services provided; and responsibility for payment of non-covered services.     Patient would like the video invitation sent by: Other e-mail: [employer]}     Mode of Communication:  Video Conference via [employer]    As the provider I attest to compliance with applicable laws and regulations related to telemedicine.         Treatment Plan Last Reviewed: 6/5/2020  PHQ-9 / SHARIF-7 : n/a    DATA  Interactive Complexity: No  Crisis: No       Progress Since Last Session (Related to Symptoms / Goals / Homework):   Symptoms: Improving -- decrease in anxiety, improvement in mood    Homework: Partially completed- Talk with family members and ex-wife around keeping divorce matters between him and ex-wife      Episode of Care Goals: Minimal progress - PREPARATION (Decided to change - considering how); Intervened by negotiating a change plan and determining options / strategies for behavior change, identifying triggers, exploring social supports, and working towards setting a date to begin behavior change     Current / Ongoing Stressors and Concerns:   Ongoing Stress: Managing changes with divorce and  coparenting     Current Stress: serving divorce papers to ex-wife, continuing relationship with own mother, children returning to school, coparenting     Treatment Objective(s) Addressed in This Session:    Patient will identify 1-2 strategies to more effectively address stressors   Patient will compile a list of boundaries that they would like to set with others.    Patient will identify pros and cons for setting and no setting these boundaries.     Intervention:  CBT: Patient talked with family about boundary setting between him and ex-wife. He explained that family is supportive of this. He plans to reconnect with ex-wife around plans for school and making sure there is consistency in the household. He notes children have expressed wanting similar routines in each household.   Motivational Interviewing: Affirming steps patient continues to take in setting boundaries with ex-wife, talk to his family about how they are doing emotionally with the divorce and emphasizing personal choice and control.   Motivational Interviewing    MI Intervention: Expressed Empathy/Understanding, Supported Autonomy, Collaboration, Evocation, Permission to raise concern or advise, Open-ended questions, Reflections: simple and complex, Change talk (evoked) and Reframe     Change Talk Expressed by the Patient: Desire to change Ability to change Reasons to change Need to change Committment to change Activation    Provider Response to Change Talk: E - Evoked more info from patient about behavior change, A - Affirmed patient's thoughts, decisions, or attempts at behavior change, R - Reflected patient's change talk and S - Summarized patient's change talk statements          ASSESSMENT: Current Emotional / Mental Status (status of significant symptoms):   Risk status (Self / Other harm or suicidal ideation)   Patient denies current fears or concerns for personal safety.   Patient denies current or recent suicidal ideation or  behaviors.   Patient denies current or recent homicidal ideation or behaviors.   Patient denies current or recent self injurious behavior or ideation.   Patient denies other safety concerns.   Patient reports there has been no change in risk factors since their last session.     Patient reports there has been no change in protective factors since their last session.     Recommended that patient call 911 or go to the local ED should there be a change in any of these risk factors.     Appearance:   Appropriate    Eye Contact:   Good    Psychomotor Behavior: Normal    Attitude:   Cooperative  Friendly Pleasant   Orientation:   All   Speech    Rate / Production: Normal     Volume:  Normal    Mood:    Euthymic   Affect:    Appropriate    Thought Content:  Clear    Thought Form:  Coherent  Goal Directed  Logical    Insight:    Good      Medication Review:   No current psychiatric medications prescribed     Medication Compliance:   NA     Changes in Health Issues:   None reported     Chemical Use Review:   Substance Use: Problem use continues with no change since last session, Stage of Change: Contemplation        Tobacco Use: No current tobacco use.      Diagnosis:  1. Adjustment disorder, unspecified type        Collateral Reports Completed:   Not Applicable    PLAN: (Patient Tasks / Therapist Tasks / Other)  Patient: Will connect with ex-wife around setting similar routines in each household      GIUSEPPE Vanegas [facility]    August 14, 2020  Note reviewed and clinical supervision by GIUSEPPE Potts [facility] 8/29/2020      ______________________________________________________________________    Treatment Plan    Patient's Name: Jeanmarie Mckinley  YOB: 1975    Date: 6/5/2020    DSM5 Diagnoses: Adjustment Disorders  309.9 (F43.20) Unspecified  Psychosocial / Contextual Factors: Family hx of substance use and mental health, process of divorce, coparenting, business owner  WHODAS:   WHODAS 2.0 Total Score  5/14/2020   Total Score 18         Referral / Collaboration:  Referral to another professional/service is not indicated at this time..    Anticipated number of session or this episode of care: 12      MeasurableTreatment Goal(s) related to diagnosis / functional impairment(s)  Goal 1: Patient will identify strategies to address adjustment in changes in relationship and parenting.     Objective #A (Patient Action)    Patient will compile a list of boundaries that they would like to set with others. Patient will identify pros and cons for setting and no setting these boundaries.  Status: New - Date: 6/5/2020     Intervention(s)  Therapist will teach about healthy boundaries.     Objective #B  Patient will identify 1-2 strategies to more effectively address stressors.  Status: New - Date: 6/5/2020     Intervention(s)  Therapist will teach components of Interpersonal Effectiveness Skills.       Patient has reviewed and agreed to the above plan.      GIUSEPPE Vanegas [facility]    June 5, 2020  Treatment plan reviewed and clinical supervision by GIUSEPPE Potts [facility] 6/9/2020

## 2020-08-19 ENCOUNTER — OFFICE VISIT (OUTPATIENT)
Dept: FAMILY MEDICINE | Facility: CLINIC | Age: 45
End: 2020-08-19
Payer: COMMERCIAL

## 2020-08-19 VITALS
SYSTOLIC BLOOD PRESSURE: 150 MMHG | DIASTOLIC BLOOD PRESSURE: 95 MMHG | WEIGHT: 228 LBS | OXYGEN SATURATION: 98 % | TEMPERATURE: 97.1 F | HEIGHT: 70 IN | BODY MASS INDEX: 32.64 KG/M2 | HEART RATE: 72 BPM

## 2020-08-19 DIAGNOSIS — E11.65 TYPE 2 DIABETES MELLITUS WITH HYPERGLYCEMIA, WITHOUT LONG-TERM CURRENT USE OF INSULIN (H): ICD-10-CM

## 2020-08-19 DIAGNOSIS — I10 BENIGN ESSENTIAL HYPERTENSION: Primary | ICD-10-CM

## 2020-08-19 DIAGNOSIS — E78.2 MIXED HYPERLIPIDEMIA: ICD-10-CM

## 2020-08-19 DIAGNOSIS — J45.998 ASTHMA, PERSISTENT CONTROLLED: ICD-10-CM

## 2020-08-19 PROCEDURE — 99214 OFFICE O/P EST MOD 30 MIN: CPT | Performed by: FAMILY MEDICINE

## 2020-08-19 PROCEDURE — 99207 C FOOT EXAM  NO CHARGE: CPT | Performed by: FAMILY MEDICINE

## 2020-08-19 RX ORDER — METFORMIN HCL 500 MG
TABLET, EXTENDED RELEASE 24 HR ORAL
Qty: 360 TABLET | Refills: 1 | Status: SHIPPED | OUTPATIENT
Start: 2020-08-19 | End: 2021-01-29 | Stop reason: ALTCHOICE

## 2020-08-19 RX ORDER — LIRAGLUTIDE 6 MG/ML
INJECTION SUBCUTANEOUS
Qty: 18 ML | Refills: 1 | Status: SHIPPED | OUTPATIENT
Start: 2020-08-19 | End: 2020-10-08

## 2020-08-19 RX ORDER — SIMVASTATIN 40 MG
TABLET ORAL
Qty: 90 TABLET | Refills: 3 | Status: SHIPPED | OUTPATIENT
Start: 2020-08-19 | End: 2021-08-09

## 2020-08-19 RX ORDER — LISINOPRIL 20 MG/1
20 TABLET ORAL DAILY
Qty: 90 TABLET | Refills: 1 | Status: SHIPPED | OUTPATIENT
Start: 2020-08-19 | End: 2021-02-15

## 2020-08-19 RX ORDER — ALBUTEROL SULFATE 90 UG/1
1-2 AEROSOL, METERED RESPIRATORY (INHALATION) EVERY 6 HOURS PRN
Qty: 1 INHALER | Refills: 11 | Status: SHIPPED | OUTPATIENT
Start: 2020-08-19 | End: 2021-08-09

## 2020-08-19 ASSESSMENT — MIFFLIN-ST. JEOR: SCORE: 1925.45

## 2020-08-19 NOTE — PATIENT INSTRUCTIONS
BP- monitor  Target BP is < 130/80  Lisinopril increase  to 20 mg once daily - fup or rechek BP in 4 weeks/ with blood test

## 2020-08-19 NOTE — LETTER
My Asthma Action Plan    Name: Jeanmarie Mckinley   YOB: 1975  Date: 8/23/2020   My doctor: Krista Juares MD   My clinic: [facility]        My Control Medicine: Fluticasone propionate + salmeterol (Advair HFA) -  115/21 mcg twice a day  My Rescue Medicine: Albuterol (Proair/Ventolin/Proventil HFA) 2-4 puffs EVERY 4 HOURS as needed. Use a spacer if recommended by your provider.  My Oral Steroid Medicine: none My Asthma Severity:   Moderate Persistent  Know your asthma triggers: upper respiratory infections, pollens, animal dander and cold air  allergies and sickness             GREEN ZONE   Good Control    I feel good    No cough or wheeze    Can work, sleep and play without asthma symptoms       Take your asthma control medicine every day.     1. If exercise triggers your asthma, take your rescue medication    15 minutes before exercise or sports, and    During exercise if you have asthma symptoms  2. Spacer to use with inhaler: If you have a spacer, make sure to use it with your inhaler             YELLOW ZONE Getting Worse  I have ANY of these:    I do not feel good    Cough or wheeze    Chest feels tight    Wake up at night   1. Keep taking your Green Zone medications  2. Start taking your rescue medicine:    every 20 minutes for up to 1 hour. Then every 4 hours for 24-48 hours.  3. If you stay in the Yellow Zone for more than 12-24 hours, contact your doctor.  4. If you do not return to the Green Zone in 12-24 hours or you get worse, start taking your oral steroid medicine if prescribed by your provider.           RED ZONE Medical Alert - Get Help  I have ANY of these:    I feel awful    Medicine is not helping    Breathing getting harder    Trouble walking or talking    Nose opens wide to breathe       1. Take your rescue medicine NOW  2. If your provider has prescribed an oral steroid medicine, start taking it NOW  3. Call your doctor NOW  4. If you are still in the Red Zone  after 20 minutes and you have not reached your doctor:    Take your rescue medicine again and    Call 911 or go to the emergency room right away    See your regular doctor within 2 weeks of an Emergency Room or Urgent Care visit for follow-up treatment.          Annual Reminders:  Meet with Asthma Educator,  Flu Shot in the Fall, consider Pneumonia Vaccination for patients with asthma (aged 19 and older).    Pharmacy:    [id number] - [place], [place] - [address]  [id number] - [place], [place] - [address]  [facility] - [place], [place] - [address] AT PORTAL TO REGISTERED [facility]  [facility] - [place], [place]    Electronically signed by Krista Juares MD   Date: 08/23/20                      Asthma Triggers  How To Control Things That Make Your Asthma Worse    Triggers are things that make your asthma worse.  Look at the list below to help you find your triggers and what you can do about them.  You can help prevent asthma flare-ups by staying away from your triggers.      Trigger                                                          What you can do   Cigarette Smoke  Tobacco smoke can make asthma worse. Do not allow smoking in your home, car or around you.  Be sure no one smokes at a child s day care or school.  If you smoke, ask your health care provider for ways to help you quit.  Ask family members to quit too.  Ask your health care provider for a referral to Quit Plan to help you quit smoking, or call 0-596-105-PLAN.     Colds, Flu, Bronchitis  These are common triggers of asthma. Wash your hands often.  Don t touch your eyes, nose or mouth.  Get a flu shot every year.     Dust Mites  These are tiny bugs that live in cloth or carpet. They are too small to see. Wash sheets and blankets in hot water every week.   Encase pillows and mattress in dust mite proof covers.  Avoid having carpet if you can. If you have carpet, vacuum weekly.    Use a dust mask and HEPA vacuum.   Pollen and Outdoor Mold  Some people are allergic to trees, grass, or weed pollen, or molds. Try to keep your windows closed.  Limit time out doors when pollen count is high.   Ask you health care provider about taking medicine during allergy season.     Animal Dander  Some people are allergic to skin flakes, urine or saliva from pets with fur or feathers. Keep pets with fur or feathers out of your home.    If you can t keep the pet outdoors, then keep the pet out of your bedroom.  Keep the bedroom door closed.  Keep pets off cloth furniture and away from stuffed toys.     Mice, Rats, and Cockroaches   Some people are allergic to the waste from these pests.   Cover food and garbage.  Clean up spills and food crumbs.  Store grease in the refrigerator.   Keep food out of the bedroom.   Indoor Mold  This can be a trigger if your home has high moisture. Fix leaking faucets, pipes, or other sources of water.   Clean moldy surfaces.  Dehumidify basement if it is damp and smelly.   Smoke, Strong Odors, and Sprays  These can reduce air quality. Stay away from strong odors and sprays, such as perfume, powder, hair spray, paints, smoke incense, paint, cleaning products, candles and new carpet.   Exercise or Sports  Some people with asthma have this trigger. Be active!  Ask your doctor about taking medicine before sports or exercise to prevent symptoms.    Warm up for 5-10 minutes before and after sports or exercise.     Other Triggers of Asthma  Cold air:  Cover your nose and mouth with a scarf.  Sometimes laughing or crying can be a trigger.  Some medicines and food can trigger asthma.

## 2020-08-19 NOTE — PROGRESS NOTES
"Subjective     Jeanmarie Mckinley is a 45 year old male who presents to clinic today for the following health issues:    HPI       Patient is here to discuss lab results  Doing very well with medications and diet control  Happy about intentional weight loss.  Denies any low blood sugar- checks SMBG on and off.  Not fasting.    No concerns with asthma as well.    Wt Readings from Last 5 Encounters:   08/19/20 103.4 kg (228 lb)   10/10/19 108.8 kg (239 lb 12.8 oz)   07/15/19 107.4 kg (236 lb 11.2 oz)   02/21/19 110.3 kg (243 lb 3.2 oz)   06/28/18 108.6 kg (239 lb 8 oz)     BP Readings from Last 3 Encounters:   08/19/20 (!) 150/95   10/10/19 122/62   07/15/19 124/82     Lab Results   Component Value Date    A1C 5.1 08/11/2020    A1C 6.1 07/15/2019    A1C 7.8 02/21/2019    A1C 5.6 06/25/2018    A1C 5.1 11/02/2017           Review of Systems   Constitutional, HEENT, cardiovascular, pulmonary, GI, , musculoskeletal, neuro, skin, endocrine and psych systems are negative, except as otherwise noted.      Objective    BP (!) 150/95 (BP Location: Left arm, Patient Position: Sitting, Cuff Size: Adult Regular)   Pulse 72   Temp 97.1  F (36.2  C) (Temporal)   Ht 1.778 m (5' 10\")   Wt 103.4 kg (228 lb)   SpO2 98%   BMI 32.71 kg/m    Body mass index is 32.71 kg/m .  Physical Exam   GENERAL: healthy, alert and no distress  NECK: no adenopathy, no asymmetry, masses, or scars and thyroid normal to palpation  RESP: lungs clear to auscultation - no rales, rhonchi or wheezes  CV: regular rate and rhythm, normal S1 S2, no S3 or S4, no murmur, click or rub, no peripheral edema and peripheral pulses strong  ABDOMEN: soft, nontender, no hepatosplenomegaly, no masses and bowel sounds normal  MS: no gross musculoskeletal defects noted, no edema  Diabetic foot exam: normal DP and PT pulses, no trophic changes or ulcerative lesions and normal sensory exam            Assessment & Plan     Benign essential hypertension  Uncontrolled.increase " lisinopril 20 mg once daily- follow up in 4 weeks.    - lisinopril (ZESTRIL) 20 MG tablet  Dispense: 90 tablet; Refill: 1  - **Basic metabolic panel FUTURE anytime  - Home Blood Pressure Monitor Order for DME - ONLY FOR DME    Type 2 diabetes mellitus with hyperglycemia, without long-term current use of insulin (H)  Controlled  - C FOOT EXAM  NO CHARGE  - lisinopril (ZESTRIL) 20 MG tablet  Dispense: 90 tablet; Refill: 1  - liraglutide (VICTOZA PEN) 18 MG/3ML solution  Dispense: 18 mL; Refill: 1  - METFORMIN 2000 MG every day   -follow up in 6 months  - call or inform earlier if any hypoglycemia or low   Mixed hyperlipidemia  stable  - simvastatin (ZOCOR) 40 MG tablet  Dispense: 90 tablet; Refill: 3  Recent Labs   Lab Test 08/11/20  0849 07/15/19  1319   CHOL 138 113   HDL 52 34*   LDL 70 65   TRIG 79 68       Asthma, persistent controlled  CONTROLLED .  - fluticasone-vilanterol (BREO ELLIPTA) 200-25 MCG/INH inhaler  Dispense: 3 Inhaler; Refill: 3      Potential medication side effects were discussed with the patient; let me know if any occur.      Return in about 4 weeks (around 9/16/2020) for BP Recheck/ HTN.    Krista Juares MD  [facility]

## 2020-08-24 ASSESSMENT — ASTHMA QUESTIONNAIRES: ACT_TOTALSCORE: 25

## 2020-09-11 ENCOUNTER — VIRTUAL VISIT (OUTPATIENT)
Dept: PSYCHOLOGY | Facility: CLINIC | Age: 45
End: 2020-09-11
Payer: COMMERCIAL

## 2020-09-11 DIAGNOSIS — F43.20 ADJUSTMENT DISORDER, UNSPECIFIED TYPE: Primary | ICD-10-CM

## 2020-09-11 PROCEDURE — 90834 PSYTX W PT 45 MINUTES: CPT | Mod: 95 | Performed by: SOCIAL WORKER

## 2020-09-11 NOTE — PROGRESS NOTES
Progress Note    Patient Name: Jeanmarie Mckinley  Date: 9/11/2020         Service Type: Individual      Session Start Time: 8:02 AM  Session End Time: 8:47 AM     Session Length: 45 mins    Session #: 9    Attendees: Client attended alone    Service Modality:  Video Visit:    Telemedicine Visit: The patient's condition can be safely assessed and treated via synchronous audio and visual telemedicine encounter.      Reason for Telemedicine Visit: Patient has requested telehealth visit    Originating Site (Patient Location): Patient's home    Distant Site (Provider Location): Provider Remote Setting    Consent:  The patient/guardian has verbally consented to: the potential risks and benefits of telemedicine (video visit) versus in person care; bill my insurance or make self-payment for services provided; and responsibility for payment of non-covered services.     Patient would like the video invitation sent by: Other e-mail: [employer]}     Mode of Communication:  Video Conference via [employer]    As the provider I attest to compliance with applicable laws and regulations related to telemedicine.         Treatment Plan Last Reviewed: 9/11/2020  PHQ-9 / SHARIF-7 : n/a    DATA  Interactive Complexity: No  Crisis: No       Progress Since Last Session (Related to Symptoms / Goals / Homework):   Symptoms: Improving -- decrease in anxiety, improvement in mood    Homework: Partially completed- Talk with family members and ex-wife around keeping divorce matters between him and ex-wife      Episode of Care Goals: Minimal progress - PREPARATION (Decided to change - considering how); Intervened by negotiating a change plan and determining options / strategies for behavior change, identifying triggers, exploring social supports, and working towards setting a date to begin behavior change     Current / Ongoing Stressors and Concerns:   Ongoing Stress: Managing changes with divorce and  coparenting     Current Stress: Conversation with kids about relationship with girlfriend, enforcing boundaries with ex-wife, developing deeper relationships.     Treatment Objective(s) Addressed in This Session:    Patient will identify 1-2 strategies to more effectively address stressors   Patient will compile a list of boundaries that they would like to set with others.    Patient will identify pros and cons for setting and no setting these boundaries.     Intervention:  CBT: Patient says he disclosed to his children recently about his relationship. He noted feeling guilty about holding this information back and wanting to be transparent. He talked through children's reaction to the news and how he navigated the conversation. Patient stated how he placed boundaries with ex-wife around topics he thought were important to address with children and explaining his reasoning for it. Provided affirmed steps patient is taking to be present with children and making space for children to express their feelings about this transition.   Motivational Interviewing: Affirming steps patient continues to take in setting boundaries with ex-wife, talk to his family about how they are doing emotionally with the divorce and emphasizing personal choice and control.   Motivational Interviewing    MI Intervention: Expressed Empathy/Understanding, Supported Autonomy, Collaboration, Evocation, Permission to raise concern or advise, Open-ended questions, Reflections: simple and complex, Change talk (evoked) and Reframe     Change Talk Expressed by the Patient: Desire to change Ability to change Reasons to change Need to change Committment to change Activation Taking steps    Provider Response to Change Talk: E - Evoked more info from patient about behavior change, A - Affirmed patient's thoughts, decisions, or attempts at behavior change, R - Reflected patient's change talk and S - Summarized patient's change talk  statements          ASSESSMENT: Current Emotional / Mental Status (status of significant symptoms):   Risk status (Self / Other harm or suicidal ideation)   Patient denies current fears or concerns for personal safety.   Patient denies current or recent suicidal ideation or behaviors.   Patient denies current or recent homicidal ideation or behaviors.   Patient denies current or recent self injurious behavior or ideation.   Patient denies other safety concerns.   Patient reports there has been no change in risk factors since their last session.     Patient reports there has been no change in protective factors since their last session.     Recommended that patient call 911 or go to the local ED should there be a change in any of these risk factors.     Appearance:   Appropriate    Eye Contact:   Good    Psychomotor Behavior: Normal    Attitude:   Cooperative  Friendly Pleasant   Orientation:   All   Speech    Rate / Production: Normal     Volume:  Normal    Mood:    Euthymic   Affect:    Appropriate    Thought Content:  Clear    Thought Form:  Coherent  Goal Directed  Logical    Insight:    Good      Medication Review:   No current psychiatric medications prescribed     Medication Compliance:   NA     Changes in Health Issues:   None reported     Chemical Use Review:   Substance Use: Chemical use reviewed, no active concerns identified      Tobacco Use: No current tobacco use.      Diagnosis:  1. Adjustment disorder, unspecified type        Collateral Reports Completed:   Not Applicable    PLAN: (Patient Tasks / Therapist Tasks / Other)  Patient: Check in with children periodically around how they are doing with the divorce and patient starting a new relationship.   Encouraged patient make room to include children's suggestions when appropriate.       GIUSEPPE Vanegas ENDER    September 11, 2020  Service Performed and Documented by LGENDER-   Note reviewed and clinical supervision by GIUSEPPE Potts [facility]  9/12/2020  ______________________________________________________________________    Treatment Plan    Patient's Name: Jeanmarie Mckinley  YOB: 1975    Date: 9/11/2020    DSM5 Diagnoses: Adjustment Disorders  309.9 (F43.20) Unspecified  Psychosocial / Contextual Factors: Family hx of substance use and mental health, process of divorce, coparenting, business owner  WHODAS:   WHODAS 2.0 Total Score 5/14/2020   Total Score 18         Referral / Collaboration:  Referral to another professional/service is not indicated at this time..    Anticipated number of session or this episode of care: 12      MeasurableTreatment Goal(s) related to diagnosis / functional impairment(s)  Goal 1: Patient will identify strategies to address adjustment in changes in relationship and parenting.     Objective #A (Patient Action)    Patient will compile a list of boundaries that they would like to set with others. Patient will identify pros and cons for setting and no setting these boundaries.  Status: continue date: 9/11/2020    Intervention(s)  Therapist will teach about healthy boundaries.     Objective #B  Patient will identify 1-2 strategies to more effectively address stressors.  Status: continue date: 9/11/2020    Intervention(s)  Therapist will teach components of Interpersonal Effectiveness Skills.       Patient has reviewed and agreed to the above plan.      GIUSEPPE Vanegas LGSW    June 5, 2020; September 11, 2020  Treatment plan reviewed and clinical supervision [facility] 6/[facility]/2020, 9/12/2020

## 2020-10-08 ENCOUNTER — VIRTUAL VISIT (OUTPATIENT)
Dept: PSYCHOLOGY | Facility: CLINIC | Age: 45
End: 2020-10-08
Payer: COMMERCIAL

## 2020-10-08 DIAGNOSIS — F43.20 ADJUSTMENT DISORDER, UNSPECIFIED TYPE: Primary | ICD-10-CM

## 2020-10-08 PROCEDURE — 90834 PSYTX W PT 45 MINUTES: CPT | Mod: 95 | Performed by: SOCIAL WORKER

## 2020-10-08 NOTE — PROGRESS NOTES
Progress Note    Patient Name: Jeanmarie Mckinley     Date: 10/8/2020         Service Type: Individual      Session Start Time: 8:04 AM    Session End Time: 8:48 AM     Session Length: 44 mins    Session #: 10    Attendees: Client attended alone    Service Modality:  Video Visit:    Telemedicine Visit: The patient's condition can be safely assessed and treated via synchronous audio and visual telemedicine encounter.      Reason for Telemedicine Visit: Patient has requested telehealth visit    Originating Site (Patient Location): Patient's home    Distant Site (Provider Location): Provider Remote Setting    Consent:  The patient/guardian has verbally consented to: the potential risks and benefits of telemedicine (video visit) versus in person care; bill my insurance or make self-payment for services provided; and responsibility for payment of non-covered services.     Patient would like the video invitation sent by: Other e-mail: [employer]}     Mode of Communication:  Video Conference via [employer]    As the provider I attest to compliance with applicable laws and regulations related to telemedicine.         Treatment Plan Last Reviewed: 9/11/2020  PHQ-9 / SHARIF-7 : n/a    DATA  Interactive Complexity: No  Crisis: No       Progress Since Last Session (Related to Symptoms / Goals / Homework):   Symptoms: Improving -- improvement in mood, motivation, less anxiety    Homework: Partially completed-       Episode of Care Goals: Satisfactory progress - ACTION (Actively working towards change); Intervened by reinforcing change plan / affirming steps taken     Current / Ongoing Stressors and Concerns:   Ongoing Stress: Managing changes with divorce and coparenting     Current Stress: telling the story about father's death     Treatment Objective(s) Addressed in This Session:    Patient will identify 1-2 strategies to more effectively address stressors   Patient will compile a list of  boundaries that they would like to set with others.    Patient will identify pros and cons for setting and no setting these boundaries.     Intervention:  CBT: Patient talked about sharing the story behind father's death to his children and close friends.  He says his youngest came up to sit on his lap while the story was being read as an effort to comfort patient. We talked about what motivated him to tell the story, and reasons he kept himself from telling it. Patient observed reason for holding this story in was to protect himself from emotional suffering. He notes opportunity for closeness in his relationships as he watched how people responded when he opened up.  We also discussed the emotions he wanted to avoid and thoughts that encouraged avoidance.   Motivational Interviewing: Affirming steps patient took in engaging in vulnerability, identifying pros and cons and barriers to exploring discussion about father's death, emphasizing personal choice and control.   Motivational Interviewing    MI Intervention: Expressed Empathy/Understanding, Supported Autonomy, Collaboration, Evocation, Permission to raise concern or advise, Open-ended questions, Reflections: simple and complex, Change talk (evoked) and Reframe     Change Talk Expressed by the Patient: Desire to change Ability to change Reasons to change Need to change Committment to change Activation Taking steps    Provider Response to Change Talk: E - Evoked more info from patient about behavior change, A - Affirmed patient's thoughts, decisions, or attempts at behavior change, R - Reflected patient's change talk and S - Summarized patient's change talk statements          ASSESSMENT: Current Emotional / Mental Status (status of significant symptoms):   Risk status (Self / Other harm or suicidal ideation)   Patient denies current fears or concerns for personal safety.   Patient denies current or recent suicidal ideation or behaviors.   Patient denies current  or recent homicidal ideation or behaviors.   Patient denies current or recent self injurious behavior or ideation.   Patient denies other safety concerns.   Patient reports there has been no change in risk factors since their last session.     Patient reports there has been no change in protective factors since their last session.     Recommended that patient call 911 or go to the local ED should there be a change in any of these risk factors.     Appearance:   Appropriate    Eye Contact:   Good    Psychomotor Behavior: Agitated    Attitude:   Cooperative  Pleasant   Orientation:   All   Speech    Rate / Production: Emotional    Volume:  Normal    Mood:    Agitated   Affect:    Appropriate    Thought Content:  Clear    Thought Form:  Coherent  Logical    Insight:    Good      Medication Review:   No current psychiatric medications prescribed     Medication Compliance:   NA     Changes in Health Issues:   None reported     Chemical Use Review:   Substance Use: Chemical use reviewed, no active concerns identified      Tobacco Use: No current tobacco use.      Diagnosis:  1. Adjustment disorder, unspecified type        Collateral Reports Completed:   Not Applicable    PLAN: (Patient Tasks / Therapist Tasks / Other)  Patient: Observe what emotions came up for patient during therapy when he was talking about his experience with children and friends, along with talking about his father.       GIUSEPPE Vanegas [facility]    October 8, 2020  Service Performed and Documented by ENDER-   Note reviewed and clinical supervision by GIUSEPPE Potts [facility] 10/11/2020      ______________________________________________________________________    Treatment Plan    Patient's Name: Jeanmarie Mckinley  YOB: 1975    Date: 9/11/2020    DSM5 Diagnoses: Adjustment Disorders  309.9 (F43.20) Unspecified  Psychosocial / Contextual Factors: Family hx of substance use and mental health, process of divorce, coparenting, business  owner  WHODAS:   WHODAS 2.0 Total Score 5/14/2020   Total Score 18         Referral / Collaboration:  Referral to another professional/service is not indicated at this time..    Anticipated number of session or this episode of care: 12      MeasurableTreatment Goal(s) related to diagnosis / functional impairment(s)  Goal 1: Patient will identify strategies to address adjustment in changes in relationship and parenting.     Objective #A (Patient Action)    Patient will compile a list of boundaries that they would like to set with others. Patient will identify pros and cons for setting and no setting these boundaries.  Status: continue date: 9/11/2020    Intervention(s)  Therapist will teach about healthy boundaries.     Objective #B  Patient will identify 1-2 strategies to more effectively address stressors.  Status: continue date: 9/11/2020    Intervention(s)  Therapist will teach components of Interpersonal Effectiveness Skills.       Patient has reviewed and agreed to the above plan.      GIUSEPPE Vanegas LGSW    June 5, 2020; September 11, 2020  Treatment plan reviewed and clinical supervision by GIUSEPPE Potts LICSW 6/9/2020, 9/12/2020

## 2020-10-15 ENCOUNTER — TELEPHONE (OUTPATIENT)
Dept: PHARMACY | Facility: CLINIC | Age: 45
End: 2020-10-15

## 2020-10-15 NOTE — TELEPHONE ENCOUNTER
Contacted patient to schedule MTM follow-up for BP check and BMP. Left voicemail asking patient to call MTM coordinators to schedule at [phone].     Briana Hernández, PharmD  MTM Pharmacy Resident

## 2020-11-05 ENCOUNTER — OFFICE VISIT (OUTPATIENT)
Dept: PHARMACY | Facility: CLINIC | Age: 45
End: 2020-11-05
Payer: COMMERCIAL

## 2020-11-05 VITALS — DIASTOLIC BLOOD PRESSURE: 78 MMHG | SYSTOLIC BLOOD PRESSURE: 117 MMHG

## 2020-11-05 DIAGNOSIS — J45.998 ASTHMA, PERSISTENT CONTROLLED: ICD-10-CM

## 2020-11-05 DIAGNOSIS — I10 BENIGN ESSENTIAL HYPERTENSION: ICD-10-CM

## 2020-11-05 DIAGNOSIS — R35.0 INCREASED FREQUENCY OF URINATION: ICD-10-CM

## 2020-11-05 DIAGNOSIS — L30.9 ECZEMA, UNSPECIFIED TYPE: ICD-10-CM

## 2020-11-05 DIAGNOSIS — E78.2 MIXED HYPERLIPIDEMIA: ICD-10-CM

## 2020-11-05 DIAGNOSIS — E11.9 CONTROLLED TYPE 2 DIABETES MELLITUS WITHOUT COMPLICATION, WITHOUT LONG-TERM CURRENT USE OF INSULIN (H): Primary | ICD-10-CM

## 2020-11-05 PROCEDURE — 99606 MTMS BY PHARM EST 15 MIN: CPT | Performed by: PHARMACIST

## 2020-11-05 NOTE — PATIENT INSTRUCTIONS
Recommendations from today's MTM visit:                                                      1. No changes today.     2. See Dr. Juares about frequent urination. I'm checking to see if this can be a video visit or if she would need you to come in for an exam.     It was great to speak with you today.  I value your experience and would be very thankful for your time with providing feedback on our clinic survey. You may receive a survey via email or text message in the next few days.     Next MTM visit: 6 months, but please let me know if you need anything sooner!     To schedule another MTM appointment, please call the clinic directly or you may call the MTM scheduling line at [phone] or toll-free at [phone].     My Clinical Pharmacist's contact information:                                                      It was a pleasure talking with you today!  Please feel free to contact me with any questions or concerns you have.      Faith Montague, Pharm.D., M.B.A., [facility]  [facility] Pharmacist, [facility]  Pager: [phone]  Email: [email or web address]       
11-Jun-2018

## 2020-11-05 NOTE — PROGRESS NOTES
MTM ENCOUNTER  SUBJECTIVE/OBJECTIVE:                           Jeanmarie Mckinley is a 45 year old male coming in for a follow-up visit. He was referred to me from Dr. Juares.  Today's visit is a follow-up MTM visit from 7/2/20     Reason for visit: F/u on lisinopril increase.    Allergies/ADRs: Reviewed in chart  Tobacco: He reports that he has never smoked. He has never used smokeless tobacco.  Alcohol: 1 beverage a week, maybe a bit more often now due to COVID.   Past Medical History: Reviewed in chart    Medication Adherence/Access: no issues reported    Diabetes:  Pt currently taking metformin ER 500mg - 4 tablets once daily and Victoza 1.2mg daily. Pt is not experiencing side effects. .   Patient is not experiencing hypoglycemia or hyperglycemia sx.   Eye exam: up to date  Foot exam: up to date  Aspirin: Not taking due to age  Diet/Exercise: 45 minutes daily of exercise now that he's not commuting.     Asthma: Currently taking Breo 200/25mcg daily and Ventolin PRN (very rarely needs this now).   Asthma triggers include: upper respiratory infections and exercise or sports.   We discussed stepping down the dose of Breo, but will hold off as winter causes his more symptoms.     Hypertension: Current medications include lisinopril 20 mg daily (increased by Dr. Juares in August).  Patient does not self-monitor BP.  Patient reports no current medication side effects.    Hyperlipidemia: Current therapy includes Simvastatin 40mg once daily.  Pt reports no significant myalgias or other side effects.    Skin: Has not had any issues with eczema this summer, but has triamcinolone in case he does have issues.     Frequent urination: Feels the need to urinate every 1-2 hours. Getting up at least two times per night. Has noticed that even on days when he drinks less water/fluids that it does not improve sx. He rarely has urinary hesitancy, but notes that sometimes the stream is weak or not straight. Feels like he empties his  bladder completely. No pain with urination or blood in the urine.     Today's Vitals: There were no vitals taken for this visit.    ASSESSMENT:                            Medication Adherence: No issues identified    Diabetes:  Stable    Asthma: Stable    Hypertension: Stable    Hyperlipidemia: Stable    Skin: Stable    Frequent urination: Given current BG control, unlikely that it's related to diabetes. He is not on a diuretic. Would benefit from further evaluation by Dr. Juares.     PLAN:                            1. No changes today.   2. See Dr. Juares about frequent urination.     I spent 15 minutes with this patient today. All changes were made via collaborative practice agreement with Dr. Juares. A copy of the visit note was provided to the patient's primary care provider.    Will follow up in 6 months, sooner if needed.    The patient was sent via [employer] a summary of these recommendations.     Faith Montague, AndersD, VITO, BCACP  MTM Pharmacist, [facility]

## 2020-11-06 ENCOUNTER — VIRTUAL VISIT (OUTPATIENT)
Dept: PSYCHOLOGY | Facility: CLINIC | Age: 45
End: 2020-11-06
Payer: COMMERCIAL

## 2020-11-06 DIAGNOSIS — F43.20 ADJUSTMENT DISORDER, UNSPECIFIED TYPE: Primary | ICD-10-CM

## 2020-11-06 PROCEDURE — 90834 PSYTX W PT 45 MINUTES: CPT | Mod: 95 | Performed by: SOCIAL WORKER

## 2020-11-06 NOTE — PROGRESS NOTES
Progress Note    Patient Name: Jeanmarie Mckinley     Date: 11/6/2020         Service Type: Individual      Session Start Time: 8:05 AM    Session End Time: 8:45 AM     Session Length: 40 mins    Session #: 10    Attendees: Client attended alone    Service Modality:  Video Visit:    Telemedicine Visit: The patient's condition can be safely assessed and treated via synchronous audio and visual telemedicine encounter.      Reason for Telemedicine Visit: Patient has requested telehealth visit    Originating Site (Patient Location): Patient's home    Distant Site (Provider Location): Provider Remote Setting    Consent:  The patient/guardian has verbally consented to: the potential risks and benefits of telemedicine (video visit) versus in person care; bill my insurance or make self-payment for services provided; and responsibility for payment of non-covered services.     Patient would like the video invitation sent by: Other e-mail: [employer]     Mode of Communication:  Video Conference via [employer]    As the provider I attest to compliance with applicable laws and regulations related to telemedicine.         Treatment Plan Last Reviewed: 9/11/2020  PHQ-9 / SHARIF-7 : n/a    DATA  Interactive Complexity: No  Crisis: No       Progress Since Last Session (Related to Symptoms / Goals / Homework):   Symptoms: Improving -- improvement in mood, motivation, less anxiety    Homework: Achieved / completed to satisfaction       Episode of Care Goals: Satisfactory progress - ACTION (Actively working towards change); Intervened by reinforcing change plan / affirming steps taken     Current / Ongoing Stressors and Concerns:   Ongoing Stress: Managing changes with divorce and coparenting     Current Stress: coparenting      Treatment Objective(s) Addressed in This Session:    Patient will identify 1-2 strategies to more effectively address stressors   Patient will compile a list of boundaries that  they would like to set with others.    Patient will identify pros and cons for setting and no setting these boundaries.     Intervention:  CBT: We discussed patient making time to process behaviors and emotional experiences with his children along with making space for his own.   Motivational Interviewing: Affirming steps patient is taking to build closer connections in his relationship, discussed level of confidence and comfort connecting with ex-wife around tackling problems the children are experiencing, we explored ideas that would help bring both to the table to check-in and contribute their ideas   Motivational Interviewing    MI Intervention: Expressed Empathy/Understanding, Supported Autonomy, Collaboration, Evocation, Permission to raise concern or advise, Open-ended questions, Reflections: simple and complex, Change talk (evoked) and Reframe     Change Talk Expressed by the Patient: Desire to change Ability to change Reasons to change Need to change Committment to change Activation Taking steps    Provider Response to Change Talk: E - Evoked more info from patient about behavior change, A - Affirmed patient's thoughts, decisions, or attempts at behavior change, R - Reflected patient's change talk and S - Summarized patient's change talk statements          ASSESSMENT: Current Emotional / Mental Status (status of significant symptoms):   Risk status (Self / Other harm or suicidal ideation)   Patient denies current fears or concerns for personal safety.   Patient denies current or recent suicidal ideation or behaviors.   Patient denies current or recent homicidal ideation or behaviors.   Patient denies current or recent self injurious behavior or ideation.   Patient denies other safety concerns.   Patient reports there has been no change in risk factors since their last session.     Patient reports there has been no change in protective factors since their last session.     Recommended that patient call 911  or go to the local ED should there be a change in any of these risk factors.     Appearance:   Appropriate    Eye Contact:   Good    Psychomotor Behavior: Normal    Attitude:   Cooperative  Friendly Pleasant   Orientation:   All   Speech    Rate / Production: Normal/ Responsive    Volume:  Normal    Mood:    Normal   Affect:    Appropriate    Thought Content:  Clear    Thought Form:  Coherent  Goal Directed  Logical    Insight:    Good      Medication Review:   No current psychiatric medications prescribed     Medication Compliance:   NA     Changes in Health Issues:   None reported     Chemical Use Review:   Substance Use: Chemical use reviewed, no active concerns identified      Tobacco Use: No current tobacco use.      Diagnosis:  1. Adjustment disorder, unspecified type        Collateral Reports Completed:   Not Applicable    PLAN: (Patient Tasks / Therapist Tasks / Other)  Patient: Open conversation with ex-wife around appropriate time to check-in        GIUSEPPE Vanegas [facility]    November 6, 2020  Service Performed and Documented by ENDER-   Note reviewed and clinical supervision by GIUSEPPE Potts [facility] 11/8/2020    ______________________________________________________________________    Treatment Plan    Patient's Name: Jeanmarie Mckinley  YOB: 1975    Date: 9/11/2020    DSM5 Diagnoses: Adjustment Disorders  309.9 (F43.20) Unspecified  Psychosocial / Contextual Factors: Family hx of substance use and mental health, process of divorce, coparenting, business owner  WHODAS:   WHODAS 2.0 Total Score 5/14/2020   Total Score 18         Referral / Collaboration:  Referral to another professional/service is not indicated at this time..    Anticipated number of session or this episode of care: 12      MeasurableTreatment Goal(s) related to diagnosis / functional impairment(s)  Goal 1: Patient will identify strategies to address adjustment in changes in relationship and parenting.     Objective #A  (Patient Action)    Patient will compile a list of boundaries that they would like to set with others. Patient will identify pros and cons for setting and no setting these boundaries.  Status: continue date: 9/11/2020    Intervention(s)  Therapist will teach about healthy boundaries.     Objective #B  Patient will identify 1-2 strategies to more effectively address stressors.  Status: continue date: 9/11/2020    Intervention(s)  Therapist will teach components of Interpersonal Effectiveness Skills.       Patient has reviewed and agreed to the above plan.      GIUSEPPE Vanegas SW    June 5, 2020; September 11, 2020  Treatment plan reviewed and clinical supervision by [facility] 6[facility]9/2020, 9/12/2020

## 2020-12-11 ENCOUNTER — VIRTUAL VISIT (OUTPATIENT)
Dept: PSYCHOLOGY | Facility: CLINIC | Age: 45
End: 2020-12-11
Payer: COMMERCIAL

## 2020-12-11 DIAGNOSIS — F43.20 ADJUSTMENT DISORDER, UNSPECIFIED TYPE: Primary | ICD-10-CM

## 2020-12-11 PROCEDURE — 90834 PSYTX W PT 45 MINUTES: CPT | Mod: 95 | Performed by: SOCIAL WORKER

## 2020-12-11 NOTE — PROGRESS NOTES
Progress Note    Patient Name: Jeanmarie Mckinley    Date: 12/11/2020         Service Type: Individual      Session Start Time: 9:05 AM    Session End Time: 9:45 AM     Session Length: 40 mins    Session #: 11    Attendees: Client attended alone    Service Modality:  Video Visit:    Telemedicine Visit: The patient's condition can be safely assessed and treated via synchronous audio and visual telemedicine encounter.      Reason for Telemedicine Visit: Patient has requested telehealth visit    Originating Site (Patient Location): Patient's home    Distant Site (Provider Location): Provider Remote Setting    Consent:  The patient/guardian has verbally consented to: the potential risks and benefits of telemedicine (video visit) versus in person care; bill my insurance or make self-payment for services provided; and responsibility for payment of non-covered services.     Patient would like the video invitation sent by: Other e-mail: [email or web address]     Mode of Communication:  Video Conference via [employer]    As the provider I attest to compliance with applicable laws and regulations related to telemedicine.         Treatment Plan Last Reviewed: 12/11/2020  PHQ-9 / SHARIF-7 : n/a    DATA  Interactive Complexity: No  Crisis: No       Progress Since Last Session (Related to Symptoms / Goals / Homework):   Symptoms: No change -- mood overall is stable, some irritability relating to process of divorce and coparenting    Homework: Achieved / completed to satisfaction       Episode of Care Goals: Satisfactory progress - ACTION (Actively working towards change); Intervened by reinforcing change plan / affirming steps taken     Current / Ongoing Stressors and Concerns:   Ongoing Stress: Managing changes with divorce and coparenting     Current Stress: coparenting, introducing partner to children     Treatment Objective(s) Addressed in This Session:    Patient will identify 1-2 strategies to more  effectively address stressors   Patient will compile a list of boundaries that they would like to set with others.    Patient will identify pros and cons for setting and no setting these boundaries.     Intervention:  CBT: Patient notes two of his children have not been too fond of the idea of meeting his partner. Patient suspects ex-wife may be expressing disapproval of patient's relationship in front of the children. We talked about how patient is making these interpretations and discussing reasons for resistance from ex-wife and children of patient moving on. Provider encouraged patient to make time to address this concern with his children.   Motivational Interviewing: Affirming steps patient is taking to build closer connections in his relationship, discussed level of confidence and comfort connecting with ex-wife around tackling problems the children are experiencing, we explored ideas that would help bring both to the table to check-in and contribute their ideas   Motivational Interviewing    MI Intervention: Expressed Empathy/Understanding, Supported Autonomy, Collaboration, Evocation, Permission to raise concern or advise, Open-ended questions, Reflections: simple and complex, Change talk (evoked) and Reframe     Change Talk Expressed by the Patient: Desire to change Ability to change Reasons to change Need to change Committment to change Activation Taking steps    Provider Response to Change Talk: E - Evoked more info from patient about behavior change, A - Affirmed patient's thoughts, decisions, or attempts at behavior change, R - Reflected patient's change talk and S - Summarized patient's change talk statements          ASSESSMENT: Current Emotional / Mental Status (status of significant symptoms):   Risk status (Self / Other harm or suicidal ideation)   Patient denies current fears or concerns for personal safety.   Patient denies current or recent suicidal ideation or behaviors.   Patient denies  current or recent homicidal ideation or behaviors.   Patient denies current or recent self injurious behavior or ideation.   Patient denies other safety concerns.   Patient reports there has been no change in risk factors since their last session.     Patient reports there has been no change in protective factors since their last session.     Recommended that patient call 911 or go to the local ED should there be a change in any of these risk factors.     Appearance:   Appropriate    Eye Contact:   Good    Psychomotor Behavior: Normal    Attitude:   Cooperative  Friendly Pleasant   Orientation:   All   Speech    Rate / Production: Normal/ Responsive    Volume:  Normal    Mood:    Normal   Affect:    Appropriate    Thought Content:  Clear    Thought Form:  Coherent  Goal Directed  Logical    Insight:    Fair      Medication Review:   No current psychiatric medications prescribed     Medication Compliance:   NA     Changes in Health Issues:   None reported     Chemical Use Review:   Substance Use: Chemical use reviewed, no active concerns identified      Tobacco Use: No current tobacco use.      Diagnosis:  1. Adjustment disorder, unspecified type        Collateral Reports Completed:   Not Applicable    PLAN: (Patient Tasks / Therapist Tasks / Other)  Patient: Open conversation with ex-wife around appropriate time to check-in     Make time to talk to each children individually about his new relationship and how they are feeling about it.      GIUSEPPE [facility]eg[facility]    December 11, 2020  Service Performed and Documented by [facility]-   Note reviewed and clinical supervision by GIUSEPPE [facility] 12/13/2020  ______________________________________________________________________    Treatment Plan    Patient's Name: Jeanmarie Mckinley  YOB: 1975    Date: 12/11/2020    DSM5 Diagnoses: Adjustment Disorders  309.9 (F43.20) Unspecified  Psychosocial / Contextual Factors: Family hx of substance use and mental  health, process of divorce, coparenting, business owner  WHODAS:   WHODAS 2.0 Total Score 5/14/2020   Total Score 18         Referral / Collaboration:  Referral to another professional/service is not indicated at this time..    Anticipated number of session or this episode of care: 12      MeasurableTreatment Goal(s) related to diagnosis / functional impairment(s)  Goal 1: Patient will identify strategies to address adjustment in changes in relationship and parenting.     Objective #A (Patient Action)    Patient will compile a list of boundaries that they would like to set with others. Patient will identify pros and cons for setting and no setting these boundaries.  Status: continue date: 12/11/2020    Intervention(s)  Therapist will teach about healthy boundaries.     Objective #B  Patient will identify 1-2 strategies to more effectively address stressors.  Status: continue date: 12/11/2020    Intervention(s)  Therapist will teach components of Interpersonal Effectiveness Skills.       Patient has reviewed and agreed to the above plan.      GIUSEPPE [facility]egas [facility]    June 5, 2020; September 11, 2020; December 11, 2020  Treatment plan reviewed and clinical supervision by GIUSEPPE P[facility] 6/9/2020, 9/12/2020 , 12/13/2020

## 2021-01-18 ENCOUNTER — NURSE TRIAGE (OUTPATIENT)
Dept: NURSING | Facility: CLINIC | Age: 46
End: 2021-01-18

## 2021-01-19 NOTE — TELEPHONE ENCOUNTER
BGM was 275, then did a recheck an hour later and it was 358 and 15 minutes later is 315.  Patient did say he had an alcoholic drink and maybe more carbs than normal.  Took his Metformin 30 minutes ago.    Paged on call Dr. Moseley to [phone] via answering service per guidelines.  Stay hydrated, monitor for symptoms, and recheck in the morning.  Patient updated.    Cinthya Monge RN  [facility]       Reason for Disposition    [1] Blood glucose > 300 mg/dL (16.7 mmol/L) AND [2] two or more times in a row    Additional Information    Negative: Unconscious or difficult to awaken    Negative: Acting confused (e.g., disoriented, slurred speech)    Negative: Very weak (e.g., can't stand)    Negative: Sounds like a life-threatening emergency to the triager    Negative: [1] Vomiting AND [2] signs of dehydration (e.g., very dry mouth, lightheaded, dark urine)    Negative: [1] Blood glucose > 240 mg/dL (13.3 mmol/L) AND [2] rapid breathing    Negative: Blood glucose > 500 mg/dL (27.8 mmol/L)    Negative: [1] Blood glucose > 240 mg/dL (13.3 mmol/L) AND [2] urine ketones moderate-large (or more than 1+)    Negative: [1] Blood glucose > 240 mg/dL (13.3 mmol/L) AND [2] blood ketones > 1.4 mmol/L    Negative: [1] Blood glucose > 240 mg/dL (13.3 mmol/L) AND [2] vomiting AND [3] unable to check for ketones (in blood or urine)    Negative: [1] New onset Diabetes suspected (e.g., frequent urination, weak, weight loss) AND [2] vomiting or rapid breathing    Negative: Vomiting lasts > 4 hours    Negative: Patient sounds very sick or weak to the triager    Negative: Fever > 100.5 F (38.1 C)    Negative: Blood glucose > 400 mg/dL (22.2 mmol/L)    Protocols used: DIABETES - HIGH BLOOD SUGAR-A-

## 2021-01-25 ENCOUNTER — MYC MEDICAL ADVICE (OUTPATIENT)
Dept: FAMILY MEDICINE | Facility: CLINIC | Age: 46
End: 2021-01-25

## 2021-01-26 ENCOUNTER — VIRTUAL VISIT (OUTPATIENT)
Dept: PHARMACY | Facility: CLINIC | Age: 46
End: 2021-01-26
Payer: COMMERCIAL

## 2021-01-26 DIAGNOSIS — E11.9 CONTROLLED TYPE 2 DIABETES MELLITUS WITHOUT COMPLICATION, WITHOUT LONG-TERM CURRENT USE OF INSULIN (H): Primary | ICD-10-CM

## 2021-01-26 PROCEDURE — 99607 MTMS BY PHARM ADDL 15 MIN: CPT | Mod: TEL | Performed by: PHARMACIST

## 2021-01-26 PROCEDURE — 99605 MTMS BY PHARM NP 15 MIN: CPT | Mod: TEL | Performed by: PHARMACIST

## 2021-01-26 RX ORDER — LIRAGLUTIDE 6 MG/ML
1.8 INJECTION SUBCUTANEOUS DAILY
Qty: 27 ML | Refills: 1 | Status: SHIPPED | OUTPATIENT
Start: 2021-01-26 | End: 2021-01-29

## 2021-01-26 NOTE — TELEPHONE ENCOUNTER
FYI - offered pt phone MTM visit to get more info.   Faith Montague, AndersD, VITO, BCACP  MTM Pharmacist, [facility]

## 2021-01-26 NOTE — PATIENT INSTRUCTIONS
Recommendations from today's MTM visit:                                                       1. Lab only appointment scheduled for Thrusday- check A1c and BMP  2. Increase Victoza to 1.8mg once daily    It was great to speak with you today.  I value your experience and would be very thankful for your time with providing feedback on our clinic survey. You may receive a survey via email or text message in the next few days.     Next MTM visit: I'll call you when labs return    To schedule another MTM appointment, please call the clinic directly or you may call the MTM scheduling line at [phone] or toll-free at [phone].     My Clinical Pharmacist's contact information:                                                      It was a pleasure talking with you today!  Please feel free to contact me with any questions or concerns you have.      Faith Montague, Pharm.D., M.B.A., [facility]  [facility] Pharmacist, [facility]  Pager: [phone]  Email: [email or web address]

## 2021-01-26 NOTE — PROGRESS NOTES
Medication Therapy Management (MTM) Encounter    ASSESSMENT:                            Medication Adherence/Access: No issues identified    Type 2 Diabetes: Readings sound higher, which is unusual without changes in his health, activity, diet or stress levels. Will increase Victoza since he has this at home and get an updated A1c and BMP. Depending on response to Victoza, will consider adding a third agent such as a SGLT2i.     PLAN:                            1. Lab only appointment scheduled for Thrusday- check A1c and BMP  2. Increase Victoza to 1.8mg once daily    Follow-up: when labs return.     SUBJECTIVE/OBJECTIVE:                          Jeanmarie Mckinley is a 45 year old male called for a follow-up visit. He was referred to me from Dr. Juares.  Today's visit is a follow-up MTM visit from 20     Reason for visit: Blood sugars suddenly increased.    Allergies/ADRs: Reviewed in chart  Tobacco: He reports that he has never smoked. He has never used smokeless tobacco.  Alcohol: 1 beverages / week  Past Medical History: Reviewed in chart    Medication Adherence/Access: no issues reported    Type 2 Diabetes:  Currently taking Victoza 1.2mg daily and Metformin ER 500mg - 4 tabs daily. Patient is not experiencing side effects.  Blood sugar monitorin-2 time(s) daily. Ranges (based on glucometer readings): 150-300  About a week ago his blood sugar all of sudden has shot up. Really wasn't testing prior to this (had been at least a month since he had checked last). Test strips are not , meter appears in good working order.   Symptoms of low blood sugar? none  Symptoms of high blood sugar? Hasn't noticed any changes, did have a tremor for a little bit that urged him to check his blood sugar when he saw the high reading as noted above, this has not reoccurred.   Diet/Exercise: Reports not changes since our visit in November.     ---------------    I spent 15 minutes with this patient today. All changes  were made via collaborative practice agreement with Dr. Juares. A copy of the visit note was provided to the patient's primary care provider.    The patient was sent via [employer] a summary of these recommendations.     Faith Montague PharmD, VITO, BCACP  MT Pharmacist, [facility]     Telemedicine Visit Details  Type of service:  Telephone visit  Start Time: 3:02  End Time: 3:17 PM  Originating Location (patient location): Home  Distant Location (provider location):  [facility]      Medication Therapy Recommendations  Type 2 diabetes mellitus with hyperglycemia (H)    Current Medication: metFORMIN (GLUCOPHAGE-XR) 500 MG 24 hr tablet   Rationale: Medication requires monitoring - Needs additional monitoring - Effectiveness   Recommendation: Order Lab - A1c and BMP   Status: Accepted per CPA          Current Medication: liraglutide (VICTOZA PEN) 18 MG/3ML solution   Rationale: Dose too low - Dosage too low - Effectiveness   Recommendation: Increase Dose - increase dose from 1.2 to 1.8mg daily   Status: Accepted per CPA

## 2021-01-26 NOTE — TELEPHONE ENCOUNTER
BP Readings from Last 3 Encounters:   11/05/20 117/78   08/19/20 (!) 150/95   10/10/19 122/62     Lab Results   Component Value Date    A1C 5.1 08/11/2020    A1C 6.1 07/15/2019    A1C 7.8 02/21/2019    A1C 5.6 06/25/2018    A1C 5.1 11/02/2017

## 2021-01-28 DIAGNOSIS — E11.9 CONTROLLED TYPE 2 DIABETES MELLITUS WITHOUT COMPLICATION, WITHOUT LONG-TERM CURRENT USE OF INSULIN (H): ICD-10-CM

## 2021-01-28 LAB
ANION GAP SERPL CALCULATED.3IONS-SCNC: 8 MMOL/L (ref 3–14)
BUN SERPL-MCNC: 14 MG/DL (ref 7–30)
CALCIUM SERPL-MCNC: 9.8 MG/DL (ref 8.5–10.1)
CHLORIDE SERPL-SCNC: 106 MMOL/L (ref 94–109)
CO2 SERPL-SCNC: 22 MMOL/L (ref 20–32)
CREAT SERPL-MCNC: 0.96 MG/DL (ref 0.66–1.25)
GFR SERPL CREATININE-BSD FRML MDRD: >90 ML/MIN/{1.73_M2}
GLUCOSE SERPL-MCNC: 205 MG/DL (ref 70–99)
HBA1C MFR BLD: 7.4 % (ref 0–5.6)
POTASSIUM SERPL-SCNC: 4.2 MMOL/L (ref 3.4–5.3)
SODIUM SERPL-SCNC: 136 MMOL/L (ref 133–144)

## 2021-01-28 PROCEDURE — 80048 BASIC METABOLIC PNL TOTAL CA: CPT | Performed by: FAMILY MEDICINE

## 2021-01-28 PROCEDURE — 83036 HEMOGLOBIN GLYCOSYLATED A1C: CPT | Performed by: FAMILY MEDICINE

## 2021-01-28 PROCEDURE — 36415 COLL VENOUS BLD VENIPUNCTURE: CPT | Performed by: FAMILY MEDICINE

## 2021-01-29 ENCOUNTER — VIRTUAL VISIT (OUTPATIENT)
Dept: PSYCHOLOGY | Facility: CLINIC | Age: 46
End: 2021-01-29
Payer: COMMERCIAL

## 2021-01-29 DIAGNOSIS — F43.20 ADJUSTMENT DISORDER, UNSPECIFIED TYPE: Primary | ICD-10-CM

## 2021-01-29 PROCEDURE — 90834 PSYTX W PT 45 MINUTES: CPT | Mod: 95 | Performed by: SOCIAL WORKER

## 2021-01-29 NOTE — PROGRESS NOTES
Progress Note    Patient Name: Jeanmarie Mckinley    Date: 1/29/2021         Service Type: Individual      Session Start Time: 8:05 AM    Session End Time: 8:43 AM     Session Length: 38 mins    Session #: 12    Attendees: Client attended alone    Service Modality:  Video Visit:    Telemedicine Visit: The patient's condition can be safely assessed and treated via synchronous audio and visual telemedicine encounter.      Reason for Telemedicine Visit: Patient has requested telehealth visit    Originating Site (Patient Location): Patient's home    Distant Site (Provider Location): Provider Remote Setting    Consent:  The patient/guardian has verbally consented to: the potential risks and benefits of telemedicine (video visit) versus in person care; bill my insurance or make self-payment for services provided; and responsibility for payment of non-covered services.     Patient would like the video invitation sent by: Other e-mail: [email or web address]     Mode of Communication:  Video Conference via [employer]    As the provider I attest to compliance with applicable laws and regulations related to telemedicine.         Treatment Plan Last Reviewed: 12/11/2020  PHQ-9 / SHARIF-7 : n/a    DATA  Interactive Complexity: No  Crisis: No       Progress Since Last Session (Related to Symptoms / Goals / Homework):   Symptoms: Improving : Brighter mood, improvement in connecting with children    Homework: Achieved / completed to satisfaction       Episode of Care Goals: Satisfactory progress - ACTION (Actively working towards change); Intervened by reinforcing change plan / affirming steps taken     Current / Ongoing Stressors and Concerns:   Ongoing Stress: Managing changes with divorce and coparenting     Current Stress: wrapping divorce, making space for children     Treatment Objective(s) Addressed in This Session:    Patient will identify 1-2 strategies to more effectively address stressors   Patient  will compile a list of boundaries that they would like to set with others.    Patient will identify pros and cons for setting and no setting these boundaries.     Intervention:  CBT: We discussed patient's experience of grieving with the ending of his marriage, and observing that his children may be going through this process also. Provider encouraged patient to read into stages of grief, understand his own experience with them and make space for his children to process. We observed negative thoughts of concerns ex-wife may be presenting him in a negative lens to their children. We directed this focus to his own relationship with children, not engaging in black and white thinking or labeling this as right or wrong, however seek clarity with how his children is experiencing this.   Motivational Interviewing: Affirming steps patient is taking to build closer connections in his relationship, discussed level of confidence and comfort connecting with ex-wife around tackling problems the children are experiencing, we explored ideas that would help bring both to the table to check-in and contribute their ideas   Motivational Interviewing    MI Intervention: Expressed Empathy/Understanding, Supported Autonomy, Collaboration, Evocation, Permission to raise concern or advise, Open-ended questions, Reflections: simple and complex, Change talk (evoked) and Reframe     Change Talk Expressed by the Patient: Desire to change Ability to change Reasons to change Need to change Committment to change Activation Taking steps    Provider Response to Change Talk: E - Evoked more info from patient about behavior change, A - Affirmed patient's thoughts, decisions, or attempts at behavior change, R - Reflected patient's change talk and S - Summarized patient's change talk statements          ASSESSMENT: Current Emotional / Mental Status (status of significant symptoms):   Risk status (Self / Other harm or suicidal ideation)   Patient denies  current fears or concerns for personal safety.   Patient denies current or recent suicidal ideation or behaviors.   Patient denies current or recent homicidal ideation or behaviors.   Patient denies current or recent self injurious behavior or ideation.   Patient denies other safety concerns.   Patient reports there has been no change in risk factors since their last session.     Patient reports there has been no change in protective factors since their last session.     Recommended that patient call 911 or go to the local ED should there be a change in any of these risk factors.     Appearance:   Appropriate    Eye Contact:   Good    Psychomotor Behavior: Normal    Attitude:   Cooperative  Friendly Pleasant   Orientation:   All   Speech    Rate / Production: Normal/ Responsive    Volume:  Normal    Mood:    Normal   Affect:    Appropriate  Bright    Thought Content:  Clear    Thought Form:  Coherent  Goal Directed  Logical    Insight:    Good      Medication Review:   No current psychiatric medications prescribed     Medication Compliance:   NA     Changes in Health Issues:   None reported     Chemical Use Review:   Substance Use: Chemical use reviewed, no active concerns identified      Tobacco Use: No current tobacco use.      Diagnosis:  1. Adjustment disorder, unspecified type        Collateral Reports Completed:   Not Applicable    PLAN: (Patient Tasks / Therapist Tasks / Other)  Patient: Make time to talk to each children individually and explore their emotional experience with the divorce      GIUSEPPE Vanegas [facility]    January 29, 2021  Service Performed and Documented by [facility]-   Note reviewed and clinical supervision by GIUSEPPE [facility] 1/29/2021            ______________________________________________________________________    Treatment Plan    Patient's Name: Jeanmarie Mckinley  YOB: 1975    Date: 12/11/2020    DSM5 Diagnoses: Adjustment Disorders  309.9 (F43.20)  Unspecified  Psychosocial / Contextual Factors: Family hx of substance use and mental health, process of divorce, coparenting, business owner  WHODAS:   WHODAS 2.0 Total Score 5/14/2020   Total Score 18         Referral / Collaboration:  Referral to another professional/service is not indicated at this time..    Anticipated number of session or this episode of care: 12      MeasurableTreatment Goal(s) related to diagnosis / functional impairment(s)  Goal 1: Patient will identify strategies to address adjustment in changes in relationship and parenting.     Objective #A (Patient Action)    Patient will compile a list of boundaries that they would like to set with others. Patient will identify pros and cons for setting and no setting these boundaries.  Status: continue date: 12/11/2020    Intervention(s)  Therapist will teach about healthy boundaries.     Objective #B  Patient will identify 1-2 strategies to more effectively address stressors.  Status: continue date: 12/11/2020    Intervention(s)  Therapist will teach components of Interpersonal Effectiveness Skills.       Patient has reviewed and agreed to the above plan.      GIUSEPPE [facility]egas [facility]    June 5, 2020; September 11, 2020; December 11, 2020  Treatment plan reviewed and clinical supervision [facility], 9/12/2020 , 12/13/2020

## 2021-01-29 NOTE — RESULT ENCOUNTER NOTE
I spoke with patient. He reports he has been watching diet and a bit surprise as the increase of a1C.    Advised return office visit in 1-2 months of starting jardiance     reviewed with NIALL

## 2021-03-02 ENCOUNTER — VIRTUAL VISIT (OUTPATIENT)
Dept: PHARMACY | Facility: CLINIC | Age: 46
End: 2021-03-02
Payer: COMMERCIAL

## 2021-03-02 DIAGNOSIS — E11.65 TYPE 2 DIABETES MELLITUS WITH HYPERGLYCEMIA, WITHOUT LONG-TERM CURRENT USE OF INSULIN (H): Primary | ICD-10-CM

## 2021-03-02 PROCEDURE — 99606 MTMS BY PHARM EST 15 MIN: CPT | Mod: TEL | Performed by: PHARMACIST

## 2021-03-02 NOTE — PROGRESS NOTES
Medication Therapy Management (MTM) Encounter    ASSESSMENT:                            Medication Adherence/Access: No issues identified    Type 2 Diabetes: Empagliflozin is exacerbating his underlying urination issue so switching treatment may be best.  We discussed options today (and see last MyChart message). I'm concerned about the recent loss of control and possible RUTH which would make a SFU less desirable and would make insulin a more attractive option. He is not opposed to this. Prior to making a change, will draw labs to make sure we are going down the right path. He is Ok with staying on Synjardy despite the SE until labs return.     PLAN:                            1. Labs at earliest convenience - C-Peptide, SHARIF, Glucose    Follow-up: upon lab result return.     SUBJECTIVE/OBJECTIVE:                          Jeanmarie Mckinley is a 45 year old male coming in for a follow-up visit. He was referred to me from Dr. Juares.  Today's visit is a follow-up MTM visit from 1/26/21     Reason for visit: Follow-up on medication changes and high blood sugars.  To recap - he was doing very well, blood sugars well controlled on Metformin and victoza and then all of a sudden (without change in life stressors, activity or diet) his glucose dramatically spiked.  His mom told him to try Ozempic, so he's wondering about this.     Allergies/ADRs: Reviewed in chart  Tobacco: He reports that he has never smoked. He has never used smokeless tobacco.  Alcohol: 1-3 beverage / week  Past Medical History: Reviewed in chart - he was dx with DM in his late 30's.   Family History: He is not sure about a family history of autoimmune disorders, but notes he has several family members that have arthritis (not sure if OA vs. RA). He reports he has a strong family history of T2DM and that several of his family members are insulin dependent. He does not believe that anyone in his family has T1DM.     Medication Adherence/Access: no issues  reported.     Type 2 Diabetes:  Currently taking Synjardy XR 5/1000-2 tablets daily and Victoza 1.8mg daily.  empagliflozin was started at last [facility] visit due to sudden increase in blood sugars (see last visit note). His sugars have decreased but he is urinating every 1-2 hours, including overnight, which is disruptive to his sleep schedule and is not sustainable (he normally urinates every 2-3 hours, but this increase has not improved, he recognizes there may also be an underlying prostate/bladder issue). Victoza was also increased at last visit, but he is tolerating this well.   Blood sugar monitoring: two-three time(s) daily. Ranges (patient reported): Fasting- 160, before dinner - 120-130 and Post-Prandial (dinner)- 160-170  Symptoms of low blood sugar? none  Diet/Exercise: Unchanged from previous visits. He has noticed a decrease in his weight, without trying.     ----------------    I spent 15 minutes with this patient today. All changes were made via collaborative practice agreement with Dr. Juares. A copy of the visit note was provided to the patient's primary care provider.    The patient was sent via Clicker a summary of these recommendations.     Faith Montague, AndersD, VITO, BCACP  [facility] Pharmacist, [facility]     Telemedicine Visit Details  Type of service:  Telephone visit  Start Time: 3:01  End Time: 3:16 PM  Originating Location (patient location): Home  Distant Location (provider location):  [facility]      Medication Therapy Recommendations  Type 2 diabetes mellitus with hyperglycemia (H)    Current Medication: liraglutide (VICTOZA PEN) 18 MG/3ML solution   Rationale: Medication requires monitoring - Needs additional monitoring - Effectiveness   Recommendation: Order Lab - C-Peptide, SHARIF, Glucose   Status: Accepted per CPA

## 2021-03-02 NOTE — PATIENT INSTRUCTIONS
Recommendations from today's MTM visit:                                                       1. Schedule a lab only appointment at your earliest convenience. You do not need to fast.  We'll be checking something called a SHARIF (measures for antibodies to your pancreas, common in Type 1 Diabetes), your insulin and c-peptide levels (helps us understand how much insulin your body is still making) and a glucose (we need to know what your blood sugar is at the same time we look at your insulin level to help us interpret the numbers).     It was great to speak with you today.  I value your experience and would be very thankful for your time with providing feedback on our clinic survey. You may receive a survey via email or text message in the next few days.     Next MTM visit: I'll call you/mychart you once your labs are back.     To schedule another MTM appointment, please call the clinic directly or you may call the MTM scheduling line at [phone] or toll-free at [phone].     My Clinical Pharmacist's contact information:                                                      It was a pleasure talking with you today!  Please feel free to contact me with any questions or concerns you have.     Faith Montague, Pharm.D., M.B.A., BCACP  [facility] Pharmacist, [facility]  Pager: [phone]  Email: [email or web address]

## 2021-03-05 DIAGNOSIS — E11.65 TYPE 2 DIABETES MELLITUS WITH HYPERGLYCEMIA, WITHOUT LONG-TERM CURRENT USE OF INSULIN (H): ICD-10-CM

## 2021-03-05 DIAGNOSIS — I10 BENIGN ESSENTIAL HYPERTENSION: ICD-10-CM

## 2021-03-05 LAB — INSULIN SERPL-ACNC: 10.6 MU/L (ref 3–25)

## 2021-03-05 PROCEDURE — 99000 SPECIMEN HANDLING OFFICE-LAB: CPT | Performed by: FAMILY MEDICINE

## 2021-03-05 PROCEDURE — 86341 ISLET CELL ANTIBODY: CPT | Mod: 90 | Performed by: FAMILY MEDICINE

## 2021-03-05 PROCEDURE — 80048 BASIC METABOLIC PNL TOTAL CA: CPT | Performed by: FAMILY MEDICINE

## 2021-03-05 PROCEDURE — 83525 ASSAY OF INSULIN: CPT | Performed by: FAMILY MEDICINE

## 2021-03-05 PROCEDURE — 84681 ASSAY OF C-PEPTIDE: CPT | Performed by: FAMILY MEDICINE

## 2021-03-05 PROCEDURE — 36415 COLL VENOUS BLD VENIPUNCTURE: CPT | Performed by: FAMILY MEDICINE

## 2021-03-06 LAB
ANION GAP SERPL CALCULATED.3IONS-SCNC: 6 MMOL/L (ref 3–14)
BUN SERPL-MCNC: 15 MG/DL (ref 7–30)
CALCIUM SERPL-MCNC: 9.3 MG/DL (ref 8.5–10.1)
CHLORIDE SERPL-SCNC: 106 MMOL/L (ref 94–109)
CO2 SERPL-SCNC: 25 MMOL/L (ref 20–32)
CREAT SERPL-MCNC: 1.22 MG/DL (ref 0.66–1.25)
GFR SERPL CREATININE-BSD FRML MDRD: 71 ML/MIN/{1.73_M2}
GLUCOSE SERPL-MCNC: 148 MG/DL (ref 70–99)
POTASSIUM SERPL-SCNC: 4 MMOL/L (ref 3.4–5.3)
SODIUM SERPL-SCNC: 137 MMOL/L (ref 133–144)

## 2021-03-10 LAB — C PEPTIDE SERPL-MCNC: 2.9 NG/ML (ref 0.9–6.9)

## 2021-03-11 LAB — GAD65 AB SER IA-ACNC: <5 IU/ML (ref 0–5)

## 2021-03-18 ENCOUNTER — IMMUNIZATION (OUTPATIENT)
Dept: NURSING | Facility: CLINIC | Age: 46
End: 2021-03-18
Payer: COMMERCIAL

## 2021-03-18 PROCEDURE — 91300 PR COVID VAC PFIZER DIL RECON 30 MCG/0.3 ML IM: CPT

## 2021-03-18 PROCEDURE — 0001A PR COVID VAC PFIZER DIL RECON 30 MCG/0.3 ML IM: CPT

## 2021-03-26 ENCOUNTER — VIRTUAL VISIT (OUTPATIENT)
Dept: PSYCHOLOGY | Facility: CLINIC | Age: 46
End: 2021-03-26
Payer: COMMERCIAL

## 2021-03-26 DIAGNOSIS — F43.20 ADJUSTMENT DISORDER, UNSPECIFIED TYPE: Primary | ICD-10-CM

## 2021-03-26 PROCEDURE — 90834 PSYTX W PT 45 MINUTES: CPT | Mod: 95 | Performed by: SOCIAL WORKER

## 2021-03-26 NOTE — PROGRESS NOTES
Progress Note    Patient Name: Jeanmarie Mckinley    Date: 3/26/2021         Service Type: Individual      Session Start Time: 8:03 AM    Session End Time: 8:52 AM     Session Length: 49 mins    Session #: 13    Attendees: Client attended alone    Service Modality:  Video Visit:    Telemedicine Visit: The patient's condition can be safely assessed and treated via synchronous audio and visual telemedicine encounter.      Reason for Telemedicine Visit: Patient has requested telehealth visit    Originating Site (Patient Location): Patient's home    Distant Site (Provider Location): Provider Remote Setting    Consent:  The patient/guardian has verbally consented to: the potential risks and benefits of telemedicine (video visit) versus in person care; bill my insurance or make self-payment for services provided; and responsibility for payment of non-covered services.     Patient would like the video invitation sent by: Other e-mail: [email or web address]     Mode of Communication:  Video Conference via [employer]    As the provider I attest to compliance with applicable laws and regulations related to telemedicine.         Treatment Plan Last Reviewed: 3/26/2021  PHQ-9 / SHARIF-7 : n/a    DATA  Interactive Complexity: No  Crisis: No       Progress Since Last Session (Related to Symptoms / Goals / Homework):   Symptoms: Improving : Brighter mood, improvement in connecting with children    Homework: Achieved / completed to satisfaction       Episode of Care Goals: Satisfactory progress - ACTION (Actively working towards change); Intervened by reinforcing change plan / affirming steps taken     Current / Ongoing Stressors and Concerns:   Ongoing Stress: Managing changes with divorce and coparenting     Current Stress: Connecting and supporting children     Treatment Objective(s) Addressed in This Session:    Patient will identify 1-2 strategies to more effectively address stressors   Patient will compile  a list of boundaries that they would like to set with others.    Patient will identify pros and cons for setting and no setting these boundaries.     Intervention:  CBT: We discussed challenges with connecting with children when they are experiencing high emotions. Provider encouraged patient to be mindful of their response to their children so it does not sound dismissive of emotions they are feeling, and provide space for helping children understand their emotions and the impulsive behaviors that can come from it.   DBT: We discussed different activities patient can engage with children around to help with expressing emotions and coping with emotions. Provider encouraged patient to brainstorm ideas with children for specific activities, however emphasized that these activities be effective in allowing patient and children to be present and creative.   Motivational Interviewing: Affirming steps patient continues to take with setting and holding boundaries in relationships, assessing areas of change patient continues to struggle, emphasizing personal choice and control    Motivational Interviewing    MI Intervention: Expressed Empathy/Understanding, Supported Autonomy, Collaboration, Evocation, Permission to raise concern or advise, Open-ended questions, Reflections: simple and complex, Change talk (evoked) and Reframe     Change Talk Expressed by the Patient: Desire to change Ability to change Reasons to change Need to change Committment to change Activation Taking steps    Provider Response to Change Talk: E - Evoked more info from patient about behavior change, A - Affirmed patient's thoughts, decisions, or attempts at behavior change, R - Reflected patient's change talk and S - Summarized patient's change talk statements          ASSESSMENT: Current Emotional / Mental Status (status of significant symptoms):   Risk status (Self / Other harm or suicidal ideation)   Patient denies current fears or concerns for  personal safety.   Patient denies current or recent suicidal ideation or behaviors.   Patient denies current or recent homicidal ideation or behaviors.   Patient denies current or recent self injurious behavior or ideation.   Patient denies other safety concerns.   Patient reports there has been no change in risk factors since their last session.     Patient reports there has been no change in protective factors since their last session.     Recommended that patient call 911 or go to the local ED should there be a change in any of these risk factors.     Appearance:   Appropriate    Eye Contact:   Good    Psychomotor Behavior: Normal    Attitude:   Cooperative  Friendly Pleasant   Orientation:   All   Speech    Rate / Production: Normal/ Responsive    Volume:  Normal    Mood:    Normal   Affect:    Appropriate  Bright    Thought Content:  Clear    Thought Form:  Coherent  Goal Directed  Logical    Insight:    Good      Medication Review:   No current psychiatric medications prescribed     Medication Compliance:   NA     Changes in Health Issues:   None reported     Chemical Use Review:   Substance Use: Chemical use reviewed, no active concerns identified      Tobacco Use: No current tobacco use.      Diagnosis:  1. Adjustment disorder, unspecified type        Collateral Reports Completed:   Not Applicable    PLAN: (Patient Tasks / Therapist Tasks / Other)  Patient: Make time to talk to each children individually and explore their emotional experience    Patient will work on making space for children to contribute to decision making      GIUSEPPE Vanegas [facility]    March 26, 2021  Service Performed and Documented by [facility]-   Note reviewed and clinical supervision by GIUSEPPE [facility] 3/29/2021            ______________________________________________________________________    Treatment Plan    Patient's Name: Jeanmarie Mckinley  YOB: 1975    Date: 3/26/2021    DSM5 Diagnoses: Adjustment Disorders   309.9 (F43.20) Unspecified  Psychosocial / Contextual Factors: Family hx of substance use and mental health, process of divorce, coparenting, business owner  WHODAS:   WHODAS 2.0 Total Score 5/14/2020   Total Score 18         Referral / Collaboration:  Referral to another professional/service is not indicated at this time..    Anticipated number of session or this episode of care: 12      MeasurableTreatment Goal(s) related to diagnosis / functional impairment(s)  Goal 1: Patient will identify strategies to address adjustment in changes in relationship and parenting.     Objective #A (Patient Action)    Patient will compile a list of boundaries that they would like to set with others. Patient will identify pros and cons for setting and no setting these boundaries.  Status: continue date: 3/26/2021    Intervention(s)  Therapist will teach about healthy boundaries.     Objective #B  Patient will identify 1-2 strategies to more effectively address stressors.  Status: continue date: 3/26/2021    Intervention(s)  Therapist will teach components of Interpersonal Effectiveness Skills. Will also work on learning different ways to express and hold space for emotions, by engaging in mindfulness, distress tolerance skills and emotions regulation skills.       Patient has reviewed and agreed to the above plan.      GIUSEPPE Vanegas [facility]    June 5, 2020; September 11, 2020; December 11, 2020; March 26, 2021  [facility] and clinical [facility], 9/12/2020 , 12/13/2020   Service Performed and Documented by [facility]-   Treatment plan reviewed and clinical supervision by GIUSEPPE Ingram [facility] 3/29/2021

## 2021-04-08 ENCOUNTER — IMMUNIZATION (OUTPATIENT)
Dept: NURSING | Facility: CLINIC | Age: 46
End: 2021-04-08
Attending: INTERNAL MEDICINE
Payer: COMMERCIAL

## 2021-04-08 PROCEDURE — 91300 PR COVID VAC PFIZER DIL RECON 30 MCG/0.3 ML IM: CPT

## 2021-04-08 PROCEDURE — 0002A PR COVID VAC PFIZER DIL RECON 30 MCG/0.3 ML IM: CPT

## 2021-04-24 ENCOUNTER — HEALTH MAINTENANCE LETTER (OUTPATIENT)
Age: 46
End: 2021-04-24

## 2021-05-28 ENCOUNTER — VIRTUAL VISIT (OUTPATIENT)
Dept: PSYCHOLOGY | Facility: CLINIC | Age: 46
End: 2021-05-28
Payer: COMMERCIAL

## 2021-05-28 DIAGNOSIS — F43.20 ADJUSTMENT DISORDER, UNSPECIFIED TYPE: Primary | ICD-10-CM

## 2021-05-28 PROCEDURE — 90834 PSYTX W PT 45 MINUTES: CPT | Mod: 95 | Performed by: SOCIAL WORKER

## 2021-05-28 NOTE — PROGRESS NOTES
"                                           Progress Note    Patient Name: Jeanmarie Mckinley    Date: 5/28/2021         Service Type: Individual      Session Start Time: 8:03 AM    Session End Time: 8:50 AM     Session Length: 47 mins    Session #: 14    Attendees: Client attended alone    Service Modality:  Telephone Visit    The patient has been notified of the following:      \"We have found that certain health care needs can be provided without the need for a face to face visit.  This service lets us provide the care you need with a phone conversation.       I will have full access to your [facility] medical record during this entire phone call.   I will be taking notes for your medical record.      Since this is like an office visit, we will bill your insurance company for this service.       There are potential benefits and risks of telephone visits (e.g. limits to patient confidentiality) that differ from in-person visits.?  Confidentiality still applies for telephone services, and nobody will record the visit.  It is important to be in a quiet, private space that is free of distractions (including cell phone or other devices) during the visit.??      If during the course of the call I believe a telephone visit is not appropriate, you will not be charged for this service\"     Consent has been obtained for this service by care team member: Yes            Treatment Plan Last Reviewed: 3/26/2021  PHQ-9 / SHARIF-7 : n/a    DATA  Interactive Complexity: No  Crisis: No       Progress Since Last Session (Related to Symptoms / Goals / Homework):   Symptoms: Improving : Brighter mood, improvement in connecting with children    Homework: Achieved / completed to satisfaction       Episode of Care Goals: Satisfactory progress - ACTION (Actively working towards change); Intervened by reinforcing change plan / affirming steps taken     Current / Ongoing Stressors and Concerns:   Ongoing Stress: Managing changes with divorce and " coparenting     Current Stress: Connecting and supporting children, understanding patient's own narrative of being a son and father      Treatment Objective(s) Addressed in This Session:    Patient will identify 1-2 strategies to more effectively address stressors   Patient will compile a list of boundaries that they would like to set with others.    Patient will identify pros and cons for setting and no setting these boundaries.     Intervention:  CBT: Patient processed through experience of telling his story about his own father, discussed difficult emotions that came from it, and valuable steps that has helped him to heal. We bridged this experience with his own children and challenges they may face, and how patient would like to show up for them. Provider affirmed continual actions patient continues to take to bridge connections with children, and explore difficult emotions and reactions.   Motivational Interviewing: Affirming steps patient continues to take with setting and holding boundaries in relationships, assessing areas of change patient continues to struggle, emphasizing personal choice and control    Motivational Interviewing    MI Intervention: Expressed Empathy/Understanding, Supported Autonomy, Collaboration, Evocation, Permission to raise concern or advise, Open-ended questions, Reflections: simple and complex, Change talk (evoked) and Reframe     Change Talk Expressed by the Patient: Desire to change Ability to change Reasons to change Need to change Committment to change Activation Taking steps    Provider Response to Change Talk: E - Evoked more info from patient about behavior change, A - Affirmed patient's thoughts, decisions, or attempts at behavior change, R - Reflected patient's change talk and S - Summarized patient's change talk statements          ASSESSMENT: Current Emotional / Mental Status (status of significant symptoms):   Risk status (Self / Other harm or suicidal  ideation)   Patient denies current fears or concerns for personal safety.   Patient denies current or recent suicidal ideation or behaviors.   Patient denies current or recent homicidal ideation or behaviors.   Patient denies current or recent self injurious behavior or ideation.   Patient denies other safety concerns.   Patient reports there has been no change in risk factors since their last session.     Patient reports there has been no change in protective factors since their last session.     Recommended that patient call 911 or go to the local ED should there be a change in any of these risk factors.     Appearance:   Unable to assess    Eye Contact:   Unable to assess    Psychomotor Behavior: Unable to assess    Attitude:   Cooperative  Friendly Pleasant   Orientation:   All   Speech    Rate / Production: Normal/ Responsive    Volume:  Normal    Mood:    Normal   Affect:    Appropriate  Bright    Thought Content:  Clear    Thought Form:  Coherent  Goal Directed  Logical    Insight:    Good      Medication Review:   No current psychiatric medications prescribed     Medication Compliance:   NA     Changes in Health Issues:   None reported     Chemical Use Review:   Substance Use: Chemical use reviewed, no active concerns identified      Tobacco Use: No current tobacco use.      Diagnosis:  1. Adjustment disorder, unspecified type        Collateral Reports Completed:   Not Applicable    PLAN: (Patient Tasks / Therapist Tasks / Other)  Patient: Make time to talk to each children individually and explore their emotional experience    Patient will work on making space for children to contribute to decision making      GIUSEPPE Vanegas [facility]    May 28, 2021  Service Performed and Documented by [facility]-   Note reviewed and clinical supervision by GIUSEPPE [facility] 5/28/2021              ______________________________________________________________________    Treatment Plan    Patient's Name: Jeanmarie STARKEY  Elías  YOB: 1975    Date: 3/26/2021    DSM5 Diagnoses: Adjustment Disorders  309.9 (F43.20) Unspecified  Psychosocial / Contextual Factors: Family hx of substance use and mental health, process of divorce, coparenting, business owner  WHODAS:   WHODAS 2.0 Total Score 5/14/2020   Total Score 18         Referral / Collaboration:  Referral to another professional/service is not indicated at this time..    Anticipated number of session or this episode of care: 12      MeasurableTreatment Goal(s) related to diagnosis / functional impairment(s)  Goal 1: Patient will identify strategies to address adjustment in changes in relationship and parenting.     Objective #A (Patient Action)    Patient will compile a list of boundaries that they would like to set with others. Patient will identify pros and cons for setting and no setting these boundaries.  Status: continue date: 3/26/2021    Intervention(s)  Therapist will teach about healthy boundaries.     Objective #B  Patient will identify 1-2 strategies to more effectively address stressors.  Status: continue date: 3/26/2021    Intervention(s)  Therapist will teach components of Interpersonal Effectiveness Skills. Will also work on learning different ways to express and hold space for emotions, by engaging in mindfulness, distress tolerance skills and emotions regulation skills.       Patient has reviewed and agreed to the above plan.      GIUSEPPE Vanegas [facility]    June 5, 2020; September 11, 2020; December 11, 2020; March 26, 2021  Treatment plan reviewed and clinical supervision [facility], 9/12/2020 , 12/13/2020   Service Performed and Documented by [facility]-   Treatment plan reviewed and clinical supervision by GIUSEPPE [facility] 3/29/2021

## 2021-08-05 NOTE — PROGRESS NOTES
"    Assessment & Plan     Controlled type 2 diabetes mellitus without complication, without long-term current use of insulin (H)  Well controlled  Medications refilled for 1yr   - liraglutide (VICTOZA PEN) 18 MG/3ML solution; Inject 1.8 mg Subcutaneous daily  - metFORMIN (GLUCOPHAGE-XR) 500 MG 24 hr tablet; Take 4 tablets (2,000 mg) by mouth daily with food    Ok to stop glpizide & keep monitoring blood sugar  If high fasting glucose- > 140,resume - glipiZIDE (GLUCOTROL XL) 5 MG 24 hr tablet; Take 1 tablet (5 mg) by mouth daily    - simvastatin (ZOCOR) 40 MG tablet; TAKE 1 TABLET (40 MG) BY MOUTH AT BEDTIME  - FOOT EXAM  Benign essential hypertension  Controlled.  Refill given - lisinopril (ZESTRIL) 20 MG tablet; Take 1 tablet (20 mg) by mouth daily    Mixed hyperlipidemia-stable on medications and diet  - simvastatin (ZOCOR) 40 MG tablet; TAKE 1 TABLET (40 MG) BY MOUTH AT BEDTIME  Recent Labs   Lab Test 08/09/21  1110 08/11/20  0849   CHOL 155 138   HDL 50 52   LDL 89 70   TRIG 81 79       Asthma, persistent controlled  ACT/AAP- reviewed.  - fluticasone-vilanterol (BREO ELLIPTA) 200-25 MCG/INH inhaler; Inhale 1 puff into the lungs daily  - albuterol (VENTOLIN HFA) 108 (90 Base) MCG/ACT inhaler; Inhale 1-2 puffs into the lungs every 6 hours as needed for shortness of breath / dyspnea or wheezing  Up to date  On vaccination-covid and pneumonia           BMI:   Estimated body mass index is 33.95 kg/m  as calculated from the following:    Height as of this encounter: 1.78 m (5' 10.08\").    Weight as of this encounter: 107.6 kg (237 lb 2 oz).       Return in about 6 months (around 2/9/2022) for medications recheck/review/refill, DM, BP Recheck/ HTN, asthma recheck.    Krista Juares MD  [facility]    Eve Murry is a 46 year old who presents for the following health issues     HPI     Diabetes Follow-up    How often are you checking your blood sugar? 3 time daily  What time of day are " you checking your blood sugars (select all that apply)?  Before and after meals  Have you had any blood sugars above 200?  No  Have you had any blood sugars below 70?  No    What symptoms do you notice when your blood sugar is low?  Shaky and Dizzy    What concerns do you have today about your diabetes? None     Do you have any of these symptoms? (Select all that apply)  Excessive thirst    Have you had a diabetic eye exam in the last 12 months? No  SMBG  At target.Lowest 90, pre-breakfast Am can be 130-140  average   Post meals 140    Intentional weight loss- current 237wt  Wt Readings from Last 5 Encounters:   08/09/21 107.6 kg (237 lb 2 oz)   08/19/20 103.4 kg (228 lb)   10/10/19 108.8 kg (239 lb 12.8 oz)   07/15/19 107.4 kg (236 lb 11.2 oz)   02/21/19 110.3 kg (243 lb 3.2 oz)       Lab Results   Component Value Date    A1C 5.1 08/09/2021    A1C 7.4 01/28/2021    A1C 5.1 08/11/2020    A1C 6.1 07/15/2019    A1C 7.8 02/21/2019    A1C 5.6 06/25/2018         Hyperlipidemia Follow-Up      Are you regularly taking any medication or supplement to lower your cholesterol?   Yes-     Are you having muscle aches or other side effects that you think could be caused by your cholesterol lowering medication?  No    Hypertension Follow-up      Do you check your blood pressure regularly outside of the clinic? No     Are you following a low salt diet? No    Are your blood pressures ever more than 140 on the top number (systolic) OR more   than 90 on the bottom number (diastolic), for example 140/90? No    BP Readings from Last 2 Encounters:   08/09/21 132/82   11/05/20 117/78     Wt Readings from Last 5 Encounters:   08/09/21 107.6 kg (237 lb 2 oz)   08/19/20 103.4 kg (228 lb)   10/10/19 108.8 kg (239 lb 12.8 oz)   07/15/19 107.4 kg (236 lb 11.2 oz)   02/21/19 110.3 kg (243 lb 3.2 oz)     Hemoglobin A1C (%)   Date Value   08/09/2021 5.1   01/28/2021 7.4 (H)   08/11/2020 5.1     LDL Cholesterol Calculated (mg/dL)   Date Value  "  08/09/2021 89   08/11/2020 70   07/15/2019 65         How many servings of fruits and vegetables do you eat daily?  2-3    On average, how many sweetened beverages do you drink each day (Examples: soda, juice, sweet tea, etc.  Do NOT count diet or artificially sweetened beverages)?   0    How many days per week do you exercise enough to make your heart beat faster? 3 or less    How many minutes a day do you exercise enough to make your heart beat faster? 30 - 60    How many days per week do you miss taking your medication? 0        Review of Systems   Gastrointestinal: Positive for diarrhea.      Constitutional, HEENT, cardiovascular, pulmonary, GI, , musculoskeletal, neuro, skin, endocrine and psych systems are negative, except as otherwise noted.      Objective    /82   Pulse 83   Temp 97.9  F (36.6  C) (Tympanic)   Ht 1.78 m (5' 10.08\")   Wt 107.6 kg (237 lb 2 oz)   SpO2 97%   BMI 33.95 kg/m    Body mass index is 33.95 kg/m .  Physical Exam   GENERAL: healthy, alert and no distress  RESP: lungs clear to auscultation - no rales, rhonchi or wheezes  CV: regular rate and rhythm, normal S1 S2, no S3 or S4, no murmur, click or rub, no peripheral edema and peripheral pulses strong  ABDOMEN: soft, nontender, no hepatosplenomegaly, no masses and bowel sounds normal  MS: no gross musculoskeletal defects noted, no edema  SKIN: no suspicious lesions or rashes  PSYCH: mentation appears normal, affect normal/bright  Diabetic foot exam: normal DP and PT pulses, no trophic changes or ulcerative lesions, normal sensory exam and normal monofilament exam    Results for orders placed or performed in visit on 08/09/21   Basic metabolic panel     Status: Abnormal   Result Value Ref Range    Sodium 137 133 - 144 mmol/L    Potassium 4.2 3.4 - 5.3 mmol/L    Chloride 106 94 - 109 mmol/L    Carbon Dioxide (CO2) 28 20 - 32 mmol/L    Anion Gap 3 3 - 14 mmol/L    Urea Nitrogen 14 7 - 30 mg/dL    Creatinine 1.11 0.66 - 1.25 " mg/dL    Calcium 9.8 8.5 - 10.1 mg/dL    Glucose 153 (H) 70 - 99 mg/dL    GFR Estimate 79 >60 mL/min/1.73m2   Hemoglobin A1c     Status: Normal   Result Value Ref Range    Hemoglobin A1C 5.1 0.0 - 5.6 %   Lipid panel reflex to direct LDL Fasting     Status: None   Result Value Ref Range    Cholesterol 155 <200 mg/dL    Triglycerides 81 <150 mg/dL    Direct Measure HDL 50 >=40 mg/dL    LDL Cholesterol Calculated 89 <=100 mg/dL    Non HDL Cholesterol 105 <130 mg/dL    Patient Fasting > 8hrs? Yes    Albumin Random Urine Quantitative with Creat Ratio     Status: None   Result Value Ref Range    Creatinine Urine mg/dL 100 mg/dL    Albumin Urine mg/L 15 mg/L    Albumin Urine mg/g Cr 15.00 0.00 - 17.00 mg/g Cr

## 2021-08-08 ENCOUNTER — HEALTH MAINTENANCE LETTER (OUTPATIENT)
Age: 46
End: 2021-08-08

## 2021-08-09 ENCOUNTER — LAB (OUTPATIENT)
Dept: LAB | Facility: CLINIC | Age: 46
End: 2021-08-09
Payer: COMMERCIAL

## 2021-08-09 ENCOUNTER — OFFICE VISIT (OUTPATIENT)
Dept: FAMILY MEDICINE | Facility: CLINIC | Age: 46
End: 2021-08-09
Payer: COMMERCIAL

## 2021-08-09 VITALS
HEART RATE: 83 BPM | HEIGHT: 70 IN | OXYGEN SATURATION: 97 % | TEMPERATURE: 97.9 F | WEIGHT: 237.13 LBS | DIASTOLIC BLOOD PRESSURE: 82 MMHG | SYSTOLIC BLOOD PRESSURE: 132 MMHG | BODY MASS INDEX: 33.95 KG/M2

## 2021-08-09 DIAGNOSIS — I10 BENIGN ESSENTIAL HYPERTENSION: ICD-10-CM

## 2021-08-09 DIAGNOSIS — E11.9 CONTROLLED TYPE 2 DIABETES MELLITUS WITHOUT COMPLICATION, WITHOUT LONG-TERM CURRENT USE OF INSULIN (H): ICD-10-CM

## 2021-08-09 DIAGNOSIS — E78.2 MIXED HYPERLIPIDEMIA: ICD-10-CM

## 2021-08-09 DIAGNOSIS — E11.65 TYPE 2 DIABETES MELLITUS WITH HYPERGLYCEMIA, WITHOUT LONG-TERM CURRENT USE OF INSULIN (H): ICD-10-CM

## 2021-08-09 DIAGNOSIS — J45.998 ASTHMA, PERSISTENT CONTROLLED: ICD-10-CM

## 2021-08-09 LAB
ANION GAP SERPL CALCULATED.3IONS-SCNC: 3 MMOL/L (ref 3–14)
BUN SERPL-MCNC: 14 MG/DL (ref 7–30)
CALCIUM SERPL-MCNC: 9.8 MG/DL (ref 8.5–10.1)
CHLORIDE BLD-SCNC: 106 MMOL/L (ref 94–109)
CHOLEST SERPL-MCNC: 155 MG/DL
CO2 SERPL-SCNC: 28 MMOL/L (ref 20–32)
CREAT SERPL-MCNC: 1.11 MG/DL (ref 0.66–1.25)
CREAT UR-MCNC: 100 MG/DL
FASTING STATUS PATIENT QL REPORTED: YES
GFR SERPL CREATININE-BSD FRML MDRD: 79 ML/MIN/1.73M2
GLUCOSE BLD-MCNC: 153 MG/DL (ref 70–99)
HBA1C MFR BLD: 5.1 % (ref 0–5.6)
HDLC SERPL-MCNC: 50 MG/DL
LDLC SERPL CALC-MCNC: 89 MG/DL
MICROALBUMIN UR-MCNC: 15 MG/L
MICROALBUMIN/CREAT UR: 15 MG/G CR (ref 0–17)
NONHDLC SERPL-MCNC: 105 MG/DL
POTASSIUM BLD-SCNC: 4.2 MMOL/L (ref 3.4–5.3)
SODIUM SERPL-SCNC: 137 MMOL/L (ref 133–144)
TRIGL SERPL-MCNC: 81 MG/DL

## 2021-08-09 PROCEDURE — 99207 PR FOOT EXAM NO CHARGE: CPT | Performed by: FAMILY MEDICINE

## 2021-08-09 PROCEDURE — 80048 BASIC METABOLIC PNL TOTAL CA: CPT

## 2021-08-09 PROCEDURE — 80061 LIPID PANEL: CPT

## 2021-08-09 PROCEDURE — 82043 UR ALBUMIN QUANTITATIVE: CPT

## 2021-08-09 PROCEDURE — 99214 OFFICE O/P EST MOD 30 MIN: CPT | Performed by: FAMILY MEDICINE

## 2021-08-09 PROCEDURE — 36415 COLL VENOUS BLD VENIPUNCTURE: CPT

## 2021-08-09 PROCEDURE — 83036 HEMOGLOBIN GLYCOSYLATED A1C: CPT

## 2021-08-09 RX ORDER — METFORMIN HCL 500 MG
2000 TABLET, EXTENDED RELEASE 24 HR ORAL
Qty: 360 TABLET | Refills: 1 | Status: SHIPPED | OUTPATIENT
Start: 2021-08-09 | End: 2022-03-08

## 2021-08-09 RX ORDER — GLIPIZIDE 5 MG/1
5 TABLET, FILM COATED, EXTENDED RELEASE ORAL DAILY
Qty: 90 TABLET | Refills: 1 | Status: SHIPPED | OUTPATIENT
Start: 2021-08-09 | End: 2022-01-31

## 2021-08-09 RX ORDER — SIMVASTATIN 40 MG
TABLET ORAL
Qty: 90 TABLET | Refills: 3 | Status: SHIPPED | OUTPATIENT
Start: 2021-08-09 | End: 2022-03-28

## 2021-08-09 RX ORDER — ALBUTEROL SULFATE 90 UG/1
1-2 AEROSOL, METERED RESPIRATORY (INHALATION) EVERY 6 HOURS PRN
Qty: 3 G | Refills: 3 | Status: SHIPPED | OUTPATIENT
Start: 2021-08-09 | End: 2023-02-27

## 2021-08-09 RX ORDER — LISINOPRIL 20 MG/1
20 TABLET ORAL DAILY
Qty: 90 TABLET | Refills: 1 | Status: SHIPPED | OUTPATIENT
Start: 2021-08-09 | End: 2022-03-28

## 2021-08-09 RX ORDER — LIRAGLUTIDE 6 MG/ML
1.8 INJECTION SUBCUTANEOUS DAILY
Qty: 27 ML | Refills: 1 | Status: SHIPPED | OUTPATIENT
Start: 2021-08-09 | End: 2021-12-16

## 2021-08-09 ASSESSMENT — MIFFLIN-ST. JEOR: SCORE: 1963.09

## 2021-08-09 ASSESSMENT — ENCOUNTER SYMPTOMS: DIARRHEA: 1

## 2021-08-09 NOTE — LETTER
My Asthma Action Plan    Name: Jeanmarie Mckinley   YOB: 1975  Date: 8/17/2021   My doctor: Krista Juares MD   My clinic: [facility]        My Control Medicine: Fluticasone furoate + vilanterol (Breo Ellipta)  -  200/25mcg twice a day   My Rescue Medicine: Albuterol (Proair/Ventolin/Proventil HFA) 2-4 puffs EVERY 4 HOURS as needed. Use a spacer if recommended by your provider.   My Asthma Severity:   Moderate Persistent  Know your asthma triggers: upper respiratory infections, dust mites, pollens, exercise or sports, emotions and cold air  allergies and sickness             GREEN ZONE   Good Control    I feel good    No cough or wheeze    Can work, sleep and play without asthma symptoms       Take your asthma control medicine every day.     1. If exercise triggers your asthma, take your rescue medication    15 minutes before exercise or sports, and    During exercise if you have asthma symptoms  2. Spacer to use with inhaler: If you have a spacer, make sure to use it with your inhaler             YELLOW ZONE Getting Worse  I have ANY of these:    I do not feel good    Cough or wheeze    Chest feels tight    Wake up at night   1. Keep taking your Green Zone medications  2. Start taking your rescue medicine:    every 20 minutes for up to 1 hour. Then every 4 hours for 24-48 hours.  3. If you stay in the Yellow Zone for more than 12-24 hours, contact your doctor.  4. If you do not return to the Green Zone in 12-24 hours or you get worse, start taking your oral steroid medicine if prescribed by your provider.           RED ZONE Medical Alert - Get Help  I have ANY of these:    I feel awful    Medicine is not helping    Breathing getting harder    Trouble walking or talking    Nose opens wide to breathe       1. Take your rescue medicine NOW  2. If your provider has prescribed an oral steroid medicine, start taking it NOW  3. Call your doctor NOW  4. If you are still in the Red  Zone after 20 minutes and you have not reached your doctor:    Take your rescue medicine again and    Call 911 or go to the emergency room right away    See your regular doctor within 2 weeks of an Emergency Room or Urgent Care visit for follow-up treatment.          Annual Reminders:  Meet with Asthma Educator,  Flu Shot in the Fall, consider Pneumonia Vaccination for patients with asthma (aged 19 and older).    Pharmacy:    [id number] - [place], [place] - [address]  [id number] - [place], [place] - [address]  [facility] - [place], [place] - [address] AT PORTAL TO REGISTERED [facility] SITES  [facility] - [place], [place]    Electronically signed by Krista Juares MD   Date: 08/17/21                      Asthma Triggers  How To Control Things That Make Your Asthma Worse    Triggers are things that make your asthma worse.  Look at the list below to help you find your triggers and what you can do about them.  You can help prevent asthma flare-ups by staying away from your triggers.      Trigger                                                          What you can do   Cigarette Smoke  Tobacco smoke can make asthma worse. Do not allow smoking in your home, car or around you.  Be sure no one smokes at a child s day care or school.  If you smoke, ask your health care provider for ways to help you quit.  Ask family members to quit too.  Ask your health care provider for a referral to Quit Plan to help you quit smoking, or call 5-683-245-PLAN.     Colds, Flu, Bronchitis  These are common triggers of asthma. Wash your hands often.  Don t touch your eyes, nose or mouth.  Get a flu shot every year.     Dust Mites  These are tiny bugs that live in cloth or carpet. They are too small to see. Wash sheets and blankets in hot water every week.   Encase pillows and mattress in dust mite proof covers.  Avoid having carpet if you can. If you have carpet, vacuum  weekly.   Use a dust mask and HEPA vacuum.   Pollen and Outdoor Mold  Some people are allergic to trees, grass, or weed pollen, or molds. Try to keep your windows closed.  Limit time out doors when pollen count is high.   Ask you health care provider about taking medicine during allergy season.     Animal Dander  Some people are allergic to skin flakes, urine or saliva from pets with fur or feathers. Keep pets with fur or feathers out of your home.    If you can t keep the pet outdoors, then keep the pet out of your bedroom.  Keep the bedroom door closed.  Keep pets off cloth furniture and away from stuffed toys.     Mice, Rats, and Cockroaches   Some people are allergic to the waste from these pests.   Cover food and garbage.  Clean up spills and food crumbs.  Store grease in the refrigerator.   Keep food out of the bedroom.   Indoor Mold  This can be a trigger if your home has high moisture. Fix leaking faucets, pipes, or other sources of water.   Clean moldy surfaces.  Dehumidify basement if it is damp and smelly.   Smoke, Strong Odors, and Sprays  These can reduce air quality. Stay away from strong odors and sprays, such as perfume, powder, hair spray, paints, smoke incense, paint, cleaning products, candles and new carpet.   Exercise or Sports  Some people with asthma have this trigger. Be active!  Ask your doctor about taking medicine before sports or exercise to prevent symptoms.    Warm up for 5-10 minutes before and after sports or exercise.     Other Triggers of Asthma  Cold air:  Cover your nose and mouth with a scarf.  Sometimes laughing or crying can be a trigger.  Some medicines and food can trigger asthma.

## 2021-08-17 PROBLEM — E11.9 CONTROLLED TYPE 2 DIABETES MELLITUS WITHOUT COMPLICATION, WITHOUT LONG-TERM CURRENT USE OF INSULIN (H): Status: ACTIVE | Noted: 2021-08-17

## 2021-08-20 ENCOUNTER — VIRTUAL VISIT (OUTPATIENT)
Dept: PSYCHOLOGY | Facility: CLINIC | Age: 46
End: 2021-08-20
Payer: COMMERCIAL

## 2021-08-20 DIAGNOSIS — F43.20 ADJUSTMENT DISORDER, UNSPECIFIED TYPE: Primary | ICD-10-CM

## 2021-08-20 PROCEDURE — 90834 PSYTX W PT 45 MINUTES: CPT | Mod: 95 | Performed by: SOCIAL WORKER

## 2021-08-20 NOTE — PROGRESS NOTES
Discharge Summary  Multiple Sessions    Client Name: Eboni Mckinley MRN#: [id number] YOB: 1975    Discharge Date:   2021    Service Modality: Video Visit:      Provider verified identity through the following two step process.  Patient provided:  Patient  and Patient address    Telemedicine Visit: The patient's condition can be safely assessed and treated via synchronous audio and visual telemedicine encounter.      Reason for Telemedicine Visit: Patient has requested telehealth visit    Originating Site (Patient Location): Patient's other vehicle    Distant Site (Provider Location): Provider Remote Setting- Home Office    Consent:  The patient/guardian has verbally consented to: the potential risks and benefits of telemedicine (video visit) versus in person care; bill my insurance or make self-payment for services provided; and responsibility for payment of non-covered services.     Patient would like the video invitation sent by:  Send to e-mail at: [email or web address]    Mode of Communication:  Video Conference via [email or web address]    As the provider I attest to compliance with applicable laws and regulations related to telemedicine.    Service Type: Individual      Session Start Time: 8:03 AM  Session End Time: 8:54 AM      Session Length: 45 - 50     Session #: 15     Attendees: Client attended alone      Focus of Treatment Objective(s):  Client's presenting concerns included: Adjustment Difficulties related to: family concerns and loss of signigicant relationship  Stage of Change at time of Discharge: MAINTENANCE (Working to maintain change, with risk of relapse)    Medication Adherence:  NA    Chemical Use:  NA    Assessment: Current Emotional / Mental Status (status of significant symptoms):    Risk status (Self / Other harm or suicidal ideation)  Client denies current fears or concerns for personal safety.  Client denies current or recent suicidal ideation or  behaviors.  Client denies current or recent homicidal ideation or behaviors.  Client denies current or recent self injurious behavior or ideation.  Client denies other safety concerns.  A safety and risk management plan has not been developed at this time, however client was given the after-hours number should there be a change in any of these risk factors.    Appearance:   Appropriate   Eye Contact:   Good   Psychomotor Behavior: Normal   Attitude:   Cooperative   Orientation:   All  Speech   Rate / Production: Normal    Volume:  Normal   Mood:    Normal  Affect:    Appropriate   Thought Content:  Clear   Thought Form:  Coherent  Goal Directed  Logical   Insight:   Good     DSM5 Diagnoses: Adjustment Disorders  309.9 (F43.20) Unspecified  Psychosocial / Contextual Factors: Family hx of substance use and mental health, process of divorce, coparenting, business owner  WHODAS:   WHODAS 2.0 Total Score 5/14/2020   Total Score        Reason for Discharge:  Client is satisfied with progress      Aftercare Plan:  Client may resume counseling services at any time in the future by calling the [facility] Intake Office, [phone].   Provider encouraged patient to continue work on making space to process emotions he is experiencing and talk with children around changes they are going through. We discussed continued use of mindfulness and looking into motivational interviewing for skills around reflective listening and communication.       [facility]    August 20, 2021  Service Performed and Documented by LGSW-   Note reviewed and clinical supervision by GIUSEPPE In[facility] 8/24/2021

## 2021-10-04 ENCOUNTER — HEALTH MAINTENANCE LETTER (OUTPATIENT)
Age: 46
End: 2021-10-04

## 2021-10-22 ENCOUNTER — IMMUNIZATION (OUTPATIENT)
Dept: PEDIATRICS | Facility: CLINIC | Age: 46
End: 2021-10-22
Payer: COMMERCIAL

## 2021-10-22 PROCEDURE — 90471 IMMUNIZATION ADMIN: CPT

## 2021-10-22 PROCEDURE — 90686 IIV4 VACC NO PRSV 0.5 ML IM: CPT

## 2021-11-22 ENCOUNTER — IMMUNIZATION (OUTPATIENT)
Dept: NURSING | Facility: CLINIC | Age: 46
End: 2021-11-22
Payer: COMMERCIAL

## 2021-11-22 ENCOUNTER — MYC MEDICAL ADVICE (OUTPATIENT)
Dept: FAMILY MEDICINE | Facility: CLINIC | Age: 46
End: 2021-11-22

## 2021-11-22 PROCEDURE — 0004A PR COVID VAC PFIZER DIL RECON 30 MCG/0.3 ML IM: CPT

## 2021-11-22 PROCEDURE — 91300 PR COVID VAC PFIZER DIL RECON 30 MCG/0.3 ML IM: CPT

## 2021-12-23 ENCOUNTER — OFFICE VISIT (OUTPATIENT)
Dept: OPHTHALMOLOGY | Facility: CLINIC | Age: 46
End: 2021-12-23
Payer: COMMERCIAL

## 2021-12-23 DIAGNOSIS — E11.9 CONTROLLED TYPE 2 DIABETES MELLITUS WITHOUT COMPLICATION, WITHOUT LONG-TERM CURRENT USE OF INSULIN (H): ICD-10-CM

## 2021-12-23 DIAGNOSIS — H40.003 GLAUCOMA SUSPECT OF BOTH EYES: Primary | ICD-10-CM

## 2021-12-23 PROCEDURE — 92133 CPTRZD OPH DX IMG PST SGM ON: CPT | Performed by: STUDENT IN AN ORGANIZED HEALTH CARE EDUCATION/TRAINING PROGRAM

## 2021-12-23 PROCEDURE — G0463 HOSPITAL OUTPT CLINIC VISIT: HCPCS | Mod: 25

## 2021-12-23 PROCEDURE — 92012 INTRM OPH EXAM EST PATIENT: CPT | Mod: GC | Performed by: STUDENT IN AN ORGANIZED HEALTH CARE EDUCATION/TRAINING PROGRAM

## 2021-12-23 PROCEDURE — 92083 EXTENDED VISUAL FIELD XM: CPT | Performed by: STUDENT IN AN ORGANIZED HEALTH CARE EDUCATION/TRAINING PROGRAM

## 2021-12-23 ASSESSMENT — CONF VISUAL FIELD
OD_NORMAL: 1
OS_NORMAL: 1
METHOD: COUNTING FINGERS

## 2021-12-23 ASSESSMENT — VISUAL ACUITY
OS_SC+: -1
METHOD: SNELLEN - LINEAR
OS_SC: 20/20
OD_SC+: -1
OD_SC: 20/25

## 2021-12-23 ASSESSMENT — EXTERNAL EXAM - RIGHT EYE: OD_EXAM: NORMAL

## 2021-12-23 ASSESSMENT — CUP TO DISC RATIO
OD_RATIO: 0.75
OS_RATIO: 0.7

## 2021-12-23 ASSESSMENT — TONOMETRY
IOP_METHOD: APPLANATION
IOP_METHOD: TONOPEN
OS_IOP_MMHG: 23
OS_IOP_MMHG: 19
OD_IOP_MMHG: 16
OD_IOP_MMHG: 22

## 2021-12-23 ASSESSMENT — EXTERNAL EXAM - LEFT EYE: OS_EXAM: NORMAL

## 2021-12-23 ASSESSMENT — SLIT LAMP EXAM - LIDS
COMMENTS: NORMAL
COMMENTS: NORMAL

## 2021-12-23 NOTE — NURSING NOTE
"Chief Complaints and History of Present Illnesses   Patient presents with     Diabetic Eye Exam     Chief Complaint(s) and History of Present Illness(es)     Diabetic Eye Exam     Vision: is stable    Associated symptoms: floaters (\"I've had floaters, but I think it's been more related to blood pressure.\" ).  Negative for flashes, blurred vision, redness, tearing and itching    Diabetes Type: Type 2, on insulin and taking oral medications    Duration: years    Blood Sugars: is controlled    Treatments tried: eye drops    Response to treatment: no improvement    Pain scale: 7/10              Comments     Hx Glaucoma suspect each eye and Type 2 Diabetes Mellitus without retinopathy.   Patient states vision is stable each eye within the last couple years. Patient notes some intermittent pain each eye when allergies are bothering him. \"It helps to keep my eyes shut during that time. I used drops in the past, but they didn't provide much relief with the allergy flare ups. When they get painful, it's a 7/10 on the pain scale.\" Denies dryness each eye.     Last BS: 150 a couple days ago  Lab Results       Component                Value               Date                       A1C                      5.1                 08/09/2021                 A1C                      7.4                 01/28/2021                 A1C                      5.1                 08/11/2020                 A1C                      6.1                 07/15/2019                 A1C                      7.8                 02/21/2019                 A1C                      5.6                 06/25/2018              CATHY Herrera 8:27 AM 12/23/2021                  "

## 2021-12-23 NOTE — PROGRESS NOTES
"  [facility]Y [facility] Patient Note    CC:  2 year follow up for annual CEE    HPI:  HPI     Diabetic Eye Exam     Vision is stable.  Associated symptoms include floaters (\"I've had floaters, but I think it's been more related to blood pressure.\" ).  Negative for flashes, blurred vision, redness, tearing and itching.  Diabetes characteristics include Type 2, on insulin and taking oral medications.  Duration of years.  Blood sugar level is controlled.  Treatments tried include eye drops.  Response to treatment was no improvement.  Pain was noted as 7/10.              Comments     Hx Glaucoma suspect each eye and Type 2 Diabetes Mellitus without retinopathy.   Patient states vision is stable each eye within the last couple years. Patient notes some intermittent pain each eye when allergies are bothering him. \"It helps to keep my eyes shut during that time. I used drops in the past, but they didn't provide much relief with the allergy flare ups. When they get painful, it's a 7/10 on the pain scale.\" Denies dryness each eye.     Last BS: 150 a couple days ago  Lab Results       Component                Value               Date                       A1C                      5.1                 08/09/2021                 A1C                      7.4                 01/28/2021                 A1C                      5.1                 08/11/2020                 A1C                      6.1                 07/15/2019                 A1C                      7.8                 02/21/2019                 A1C                      5.6                 06/25/2018              CATHY Herrera 8:27 AM 12/23/2021            Last edited by Monica Dotson on 12/23/2021  8:27 AM. (History)        Jeanmarie Mckinley is a 46 year old male here for yearly eye exam with PMH of T2DM and glaucoma suspect. BOBBI was 2019. He denies any changes in vision. No eye pain/irritation. No flashes or floaters. No concerns. DM " well-controlled, diagnosed 10 years ago, last A1C 2.1 8/2021     PMHx: T2DM x 10 years - metformin, victoza, glipizide - well controlled; HTN on lisiopril  POHx: Glaucoma suspect based on c/d  Ocular GTTS: None  Fam Hx: Uncle blind but unsure what from (glaucoma vs diabetes)     Assessment & Plan:  Type 2 diabetes mellitus without retinopathy  Comment: Well-controlled. No retinopathy on exam today. Discussed importance of good BS/BP/lipid control per PCP.   Plan: Yearly dilated eye exam. Sooner PRN.      Glaucoma suspect of both eyes  Comment: Stable nerve appearance. Normal OCT RNFL and VF 12/23/21. IOP wnl. Large disc with large cup, reassuring.  Plan: Observe. Yearly eye exam.    Disposition:  Return in about 1 year (around 12/23/2022) for DFE, OCT NFL.     Seen and discussed with Dr. Juan Carlos Rosa.    Rosalee Caputo MD  Resident Physician - PGY2  Department of Ophthalmology   [facility]      Attending Physician Attestation:  Complete documentation of historical and exam elements from today's encounter can be found in the full encounter summary report (not reduplicated in this progress note).  I personally obtained the chief complaint(s) and history of present illness.  I confirmed and edited as necessary the review of systems, past medical/surgical history, family history, social history, and examination findings as documented by others; and I examined the patient myself.  I personally reviewed the relevant tests, images, and reports as documented above.  I formulated and edited as necessary the assessment and plan and discussed the findings and management plan with the patient and family. Attending Physician Image/Tesing Attestation: I personally reviewed the ophthalmic test(s) associated with this encounter, agree with the interpretation(s) as documented by the resident/fellow, and have edited the corresponding report(s) as necessary.  . - Juan Carlos Rosa MD

## 2022-03-02 NOTE — PROGRESS NOTES
Medication Therapy Management (MTM) Encounter    ASSESSMENT:                            Medication Adherence/Access: No issues identified    Type 2 Diabetes: Stable. Patient is meeting A1c goal of < 6.5%. Patient wanted to continue on current regimen. However, encouraged him to let us know if he is having hypoglycemia so that we can decrease or discontinue glipizide. Also advised him that metformin is likely the culprit of the loose stools, but he prefers to continue for now.     Hypertension: Stable. Patient is meeting blood pressure goal of < 140/90mmHg.    Hyperlipidemia: Stable.    Asthma: Stable. Encouraged patient to rinse his mouth out every time he uses Breo Ellipta to prevent oral thrush.    Eczema: Stable.    Supplements: Stable. Given the limited data on effectiveness, patient could consider stopping vitamin C, fish oil, and apple cider vinegar supplementation which was discussed with patient. Also advised patient that he could get natural probiotic through yogurt instead of supplementation.     PLAN:                            1. An A1c has been ordered for you to get at your convenience.    2. As we had discussed, you are due for a follow-up appointment with Dr. Juares.    3. Consider discontinuing the vitamin C, fish oil, probiotic, and apple cider vinegar if you are looking to decrease your pill burden.      4. Try to make it part of your daily routine to rinse your mouth out after using the inhaler to prevent a fungal infection in your mouth.     Follow-up: check in over MyChart once A1c results; if things are stable will f/up in 1 year    SUBJECTIVE/OBJECTIVE:                          Jeanmarie Mckinley is a 46 year old male contacted via telephone for a follow-up visit. Today's visit is a follow-up MTM visit from 3/2/21.    Reason for visit: annual f/up.    Allergies/ADRs: Reviewed in chart  Past Medical History: Reviewed in chart  Tobacco: He reports that he has never smoked. He has never used  "smokeless tobacco.  Alcohol: 1-3 beverage / week    Medication Adherence/Access: no issues reported.  Stores medications in 2 pill boxes (morning and evening).   Misses a dose of medications once every few months.     Type 2 Diabetes: Currently taking glipizide XL 5 mg daily, metformin XR 2000 mg daily, and Victoza 1.8 mg daily. Patient is experiencing the following side effects: loose stools (once daily but not bothersome)  Blood sugar monitoring: Ranges (patient reported):   Fastin - 150 mg/dL  2 hour PP: 80 - 120 mg/dL  Symptoms of low blood sugar? none  Symptoms of high blood sugar? none  Eye exam: up to date  Foot exam: up to date  Diet: \"been doing pretty good recently\", taking more time to cook for self  Enjoys eating chicken, rice, brussel sprouts, tacos,  Exercise: 30 - 60 minutes walks    Hypertension: Current medications include lisinopril 20 mg daily.  Patient does not self-monitor blood pressure. Patient reports no current medication side effects.    Hyperlipidemia: Current therapy includes simvastatin 40 mg daily at bedtime. Patient reports no significant myalgias or other side effects.    Asthma: Current medications: ICS/LABA - Breo 1 puff(s) once daily  Short-Acting Bronchodilator: Albuterol MDI - only had to use in January when he had a cold. Reports no SOB.  Patient rinses their mouth after using steroid inhaler about 30% of the time.     Eczema: Uses triamcinolone twice daily as needed for eczema (last used in January). Finds effective when needed.    Supplements: Currently taking MVI daily, fish oil daily, apple cider vinegar daily, vitamin C daily, probiotic daily, and vitamin D 2000 units daily. No reported issues at this time. .  ----------------  I spent 30 minutes with this patient today. All changes were made via collaborative practice agreement with Krista Juares MD. A copy of the visit note was provided to the patient's provider(s).    The patient was sent via TrackR a " summary of these recommendations.     Paula Song, PharmD   Pharmaceutical Care Resident   Medication Therapy Management    Preceptor cosignature: Jeanmarie Mckinley was seen independently by Dr. Song. I have reviewed the assessment and plan. Faith Montague PharmD, VITO, BCACP    Telemedicine Visit Details  Type of service:  Telephone visit  Start Time: 10:30 AM  End Time: 11:00 AM  Originating Location (patient location): [place]  Distant [facility]     Medication Therapy Recommendations  Asthma, persistent controlled    Current Medication: fluticasone-vilanterol (BREO ELLIPTA) 200-25 MCG/INH inhaler   Rationale: Incorrect administration - Adverse medication event - Safety   Recommendation: Provide Education   Status: Patient Agreed - Adherence/Education         Takes dietary supplements    Current Medication: Ascorbic Acid (VITAMIN C) 100 MG CHEW   Rationale: No medical indication at this time - Unnecessary medication therapy - Indication   Recommendation: Discontinue Medication   Status: Declined per Patient

## 2022-03-03 ENCOUNTER — VIRTUAL VISIT (OUTPATIENT)
Dept: PHARMACY | Facility: CLINIC | Age: 47
End: 2022-03-03
Payer: COMMERCIAL

## 2022-03-03 DIAGNOSIS — J45.998 ASTHMA, PERSISTENT CONTROLLED: ICD-10-CM

## 2022-03-03 DIAGNOSIS — E11.65 TYPE 2 DIABETES MELLITUS WITH HYPERGLYCEMIA, WITHOUT LONG-TERM CURRENT USE OF INSULIN (H): Primary | ICD-10-CM

## 2022-03-03 DIAGNOSIS — I10 BENIGN ESSENTIAL HYPERTENSION: ICD-10-CM

## 2022-03-03 DIAGNOSIS — L30.9 ECZEMA, UNSPECIFIED TYPE: ICD-10-CM

## 2022-03-03 DIAGNOSIS — Z78.9 TAKES DIETARY SUPPLEMENTS: ICD-10-CM

## 2022-03-03 DIAGNOSIS — E78.2 MIXED HYPERLIPIDEMIA: ICD-10-CM

## 2022-03-03 PROCEDURE — 99607 MTMS BY PHARM ADDL 15 MIN: CPT | Performed by: PHARMACIST

## 2022-03-03 PROCEDURE — 99605 MTMS BY PHARM NP 15 MIN: CPT | Performed by: PHARMACIST

## 2022-03-03 RX ORDER — ASCORBIC ACID 100 MG
1 TABLET,CHEWABLE ORAL DAILY
COMMUNITY
End: 2023-04-06

## 2022-03-03 RX ORDER — LACTOBACILLUS RHAMNOSUS GG 10B CELL
1 CAPSULE ORAL DAILY
COMMUNITY
End: 2023-04-06

## 2022-03-03 RX ORDER — AMOXICILLIN 500 MG
1200 CAPSULE ORAL DAILY
COMMUNITY

## 2022-03-03 RX ORDER — CHOLECALCIFEROL (VITAMIN D3) 50 MCG
1 TABLET ORAL DAILY
COMMUNITY

## 2022-03-03 NOTE — PATIENT INSTRUCTIONS
Recommendations from today's MTM visit:                                                       1. An A1c has been ordered for you to get at your convenience.    2. As we had discussed, you are due for a follow-up appointment with Dr. Juares.    3. Consider discontinuing the vitamin C, fish oil, probiotic, and apple cider vinegar if you are looking to decrease your pill burden.      4. Try to make it part of your daily routine to rinse your mouth out after using the inhaler to prevent a fungal infection in your mouth.     Follow-up: check in over MyChart once A1c results; if things are stable will f/up in 1 year    It was great to speak with you today.  I value your experience and would be very thankful for your time with providing feedback on our clinic survey. You may receive a survey via email or text message in the next few days.     To schedule another MTM appointment, please call the clinic directly or you may call the MTM scheduling line at [phone] or toll-free at [phone].     My Clinical Pharmacist's contact information:                                                      Please feel free to contact me with any questions or concerns you have.      Paula Song, PharmD   Medication Therapy Management   Pharmacist Resident  Pager: [phone]

## 2022-03-09 ENCOUNTER — LAB (OUTPATIENT)
Dept: LAB | Facility: CLINIC | Age: 47
End: 2022-03-09
Payer: COMMERCIAL

## 2022-03-09 DIAGNOSIS — E11.65 TYPE 2 DIABETES MELLITUS WITH HYPERGLYCEMIA, WITHOUT LONG-TERM CURRENT USE OF INSULIN (H): ICD-10-CM

## 2022-03-09 LAB — HBA1C MFR BLD: 5.7 % (ref 0–5.6)

## 2022-03-09 PROCEDURE — 83036 HEMOGLOBIN GLYCOSYLATED A1C: CPT

## 2022-03-09 PROCEDURE — 36415 COLL VENOUS BLD VENIPUNCTURE: CPT

## 2022-03-15 NOTE — PROGRESS NOTES
"  Assessment & Plan     Screen for colon cancer  Colon cancer options discussed & he is willing to proceed with colonoscopy  - Adult Gastro Ref - Procedure Only; Future    Controlled type 2 diabetes mellitus without complication, without long-term current use of insulin (H)  Ok to stop glpizide & keep monitoring blood sugar    - Comprehensive metabolic panel (BMP + Alb, Alk Phos, ALT, AST, Total. Bili, TP); Future  - Albumin Random Urine Quantitative with Creat Ratio; Future  - Hemoglobin A1c; Future  - liraglutide (VICTOZA PEN) 18 MG/3ML solution; Inject 1.8 mg Subcutaneous daily  - lisinopril (ZESTRIL) 20 MG tablet; Take 1 tablet (20 mg) by mouth daily  - metFORMIN (GLUCOPHAGE-XR) 500 MG 24 hr tablet; TAKE 4 TABLETS (2,000MG)   DAILY WITH FOOD  - simvastatin (ZOCOR) 40 MG tablet; TAKE 1 TABLET (40 MG) BY MOUTH AT BEDTIME    Asthma, persistent controlled  Well controlled.  AAP/ACT completed   - fluticasone-vilanterol (BREO ELLIPTA) 200-25 MCG/INH inhaler; Inhale 1 puff into the lungs daily    Benign essential hypertension  UNcontrolled.-increase lisinopril  From 10 to 20 mgonce daily . Monitor BP and send a note about Blood pressure if in range of < 130/80  - Comprehensive metabolic panel (BMP + Alb, Alk Phos, ALT, AST, Total. Bili, TP); Future  - lisinopril (ZESTRIL) 20 MG tablet; Take 1 tablet (20 mg) by mouth daily    Mixed hyperlipidemia  Continue simvastatin  - Lipid Profile (Chol, Trig, HDL, LDL calc); Future  - simvastatin (ZOCOR) 40 MG tablet; TAKE 1 TABLET (40 MG) BY MOUTH AT BEDTIME  Recent Labs   Lab Test 08/09/21  1110 08/11/20  0849   CHOL 155 138   HDL 50 52   LDL 89 70   TRIG 81 79         BMI:   Estimated body mass index is 33.81 kg/m  as calculated from the following:    Height as of this encounter: 1.78 m (5' 10.08\").    Weight as of this encounter: 107.1 kg (236 lb 3.2 oz).   Weight management plan: Discussed healthy diet and exercise guidelines        Return in about 4 weeks (around 4/25/2022) " for concerns,unresolved, BP Recheck/ HTN.    Krista Juraes MD  [facility]ry is a 46 year old who presents for the following health issues     History of Present Illness     Asthma:  He presents for follow up of asthma.  He has no cough, no wheezing, and no shortness of breath. He is using a relief medication a few times a month. He typically misses taking his controller medication 3 time(s) per week.Patient is aware of the following triggers: unaware of any triggers. The patient has not had a visit to the Emergency Room, Urgent Care or Hospital due to asthma since the last clinic visit.     Diabetes:   He presents for follow up of diabetes.  He is checking home blood glucose a few times a month. He checks blood glucose before and after meals.  Blood glucose is never over 200 and never under 70. He is aware of hypoglycemia symptoms including shakiness and dizziness. He has no concerns regarding his diabetes at this time.  He is not experiencing numbness or burning in feet, excessive thirst, blurry vision, weight changes or redness, sores or blisters on feet.         He eats 2-3 servings of fruits and vegetables daily.He consumes 0 sweetened beverage(s) daily.He exercises with enough effort to increase his heart rate 30 to 60 minutes per day.  He exercises with enough effort to increase his heart rate 5 days per week.   He is taking medications regularly.     Follow up on Diabetes   No concerns  He has been on -liraglutide (VICTOZA PEN) 18 MG/3ML solution; Inject 1.8 mg Subcutaneous daily  - metFORMIN (GLUCOPHAGE-XR) 500 MG 24 hr tablet; Take 4 tablets (2,000 mg) by mouth daily with food     If high fasting glucose- > 140,resume - glipiZIDE (GLUCOTROL XL) 5 MG 24 hr tablet; Take 1 tablet (5 mg) by mouth daily     - simvastatin (ZOCOR) 40 MG tablet; TAKE 1 TABLET (40 MG) BY MOUTH AT BEDTIME  - he is due for FOOT EXAM        Lab Results   Component Value Date    A1C 5.7  "03/09/2022    A1C 5.1 08/09/2021    A1C 7.4 01/28/2021    A1C 5.1 08/11/2020    A1C 6.1 07/15/2019    A1C 7.8 02/21/2019    A1C 5.6 06/25/2018     Wt Readings from Last 5 Encounters:   03/28/22 107.1 kg (236 lb 3.2 oz)   08/09/21 107.6 kg (237 lb 2 oz)   08/19/20 103.4 kg (228 lb)   10/10/19 108.8 kg (239 lb 12.8 oz)   07/15/19 107.4 kg (236 lb 11.2 oz)     Benign essential hypertension  Has been on lisinopril 10 mg once daily       Hyperlipidemia Follow-Up      Are you regularly taking any medication or supplement to lower your cholesterol?   Yes- Simvastatin    Are you having muscle aches or other side effects that you think could be caused by your cholesterol lowering medication?  No    Hypertension Follow-up      Do you check your blood pressure regularly outside of the clinic? No     Are you following a low salt diet? No    Are your blood pressures ever more than 140 on the top number (systolic) OR more   than 90 on the bottom number (diastolic), for example 140/90? No    BP Readings from Last 2 Encounters:   03/28/22 (!) 154/89   08/09/21 132/82     Hemoglobin A1C POCT (%)   Date Value   01/28/2021 7.4 (H)   08/11/2020 5.1     Hemoglobin A1C (%)   Date Value   03/09/2022 5.7 (H)   08/09/2021 5.1     LDL Cholesterol Calculated (mg/dL)   Date Value   08/09/2021 89   08/11/2020 70   07/15/2019 65       Review of Systems   Constitutional, HEENT, cardiovascular, pulmonary, GI, , musculoskeletal, neuro, skin, endocrine and psych systems are negative, except as otherwise noted.      Objective    BP (!) 154/89   Pulse 72   Temp 98.6  F (37  C)   Ht 1.78 m (5' 10.08\")   Wt 107.1 kg (236 lb 3.2 oz)   SpO2 100%   BMI 33.81 kg/m    Body mass index is 33.81 kg/m .  Physical Exam   GENERAL: healthy, alert and no distress  NECK: no adenopathy, no asymmetry, masses, or scars and thyroid normal to palpation  RESP: lungs clear to auscultation - no rales, rhonchi or wheezes  CV: regular rate and rhythm, normal S1 S2, no " S3 or S4, no murmur, click or rub, no peripheral edema and peripheral pulses strong  ABDOMEN: soft, nontender, no hepatosplenomegaly, no masses and bowel sounds normal  MS: no gross musculoskeletal defects noted, no edema  NEURO: Normal strength and tone, mentation intact and speech normal  BACK: no CVA tenderness, no paralumbar tenderness  PSYCH: mentation appears normal, affect normal/bright

## 2022-03-28 ENCOUNTER — OFFICE VISIT (OUTPATIENT)
Dept: FAMILY MEDICINE | Facility: CLINIC | Age: 47
End: 2022-03-28
Payer: COMMERCIAL

## 2022-03-28 VITALS
WEIGHT: 236.2 LBS | TEMPERATURE: 98.6 F | HEART RATE: 72 BPM | HEIGHT: 70 IN | DIASTOLIC BLOOD PRESSURE: 89 MMHG | OXYGEN SATURATION: 100 % | BODY MASS INDEX: 33.82 KG/M2 | SYSTOLIC BLOOD PRESSURE: 154 MMHG

## 2022-03-28 DIAGNOSIS — E78.2 MIXED HYPERLIPIDEMIA: ICD-10-CM

## 2022-03-28 DIAGNOSIS — Z12.11 SCREEN FOR COLON CANCER: Primary | ICD-10-CM

## 2022-03-28 DIAGNOSIS — I10 BENIGN ESSENTIAL HYPERTENSION: ICD-10-CM

## 2022-03-28 DIAGNOSIS — E11.9 CONTROLLED TYPE 2 DIABETES MELLITUS WITHOUT COMPLICATION, WITHOUT LONG-TERM CURRENT USE OF INSULIN (H): ICD-10-CM

## 2022-03-28 DIAGNOSIS — J45.998 ASTHMA, PERSISTENT CONTROLLED: ICD-10-CM

## 2022-03-28 PROCEDURE — 99214 OFFICE O/P EST MOD 30 MIN: CPT | Performed by: FAMILY MEDICINE

## 2022-03-28 RX ORDER — METFORMIN HCL 500 MG
TABLET, EXTENDED RELEASE 24 HR ORAL
Qty: 360 TABLET | Refills: 3 | Status: SHIPPED | OUTPATIENT
Start: 2022-03-28 | End: 2023-02-27

## 2022-03-28 RX ORDER — GLIPIZIDE 5 MG/1
5 TABLET, FILM COATED, EXTENDED RELEASE ORAL DAILY
Qty: 90 TABLET | Refills: 1 | Status: SHIPPED | OUTPATIENT
Start: 2022-03-28 | End: 2022-04-01

## 2022-03-28 RX ORDER — LIRAGLUTIDE 6 MG/ML
1.8 INJECTION SUBCUTANEOUS DAILY
Qty: 27 ML | Refills: 1 | Status: SHIPPED | OUTPATIENT
Start: 2022-03-28 | End: 2022-12-15

## 2022-03-28 RX ORDER — SIMVASTATIN 40 MG
TABLET ORAL
Qty: 90 TABLET | Refills: 1 | Status: SHIPPED | OUTPATIENT
Start: 2022-03-28 | End: 2022-12-23

## 2022-03-28 RX ORDER — LISINOPRIL 20 MG/1
20 TABLET ORAL DAILY
Qty: 90 TABLET | Refills: 1 | Status: SHIPPED | OUTPATIENT
Start: 2022-03-28 | End: 2022-10-24

## 2022-03-28 ASSESSMENT — ASTHMA QUESTIONNAIRES
QUESTION_2 LAST FOUR WEEKS HOW OFTEN HAVE YOU HAD SHORTNESS OF BREATH: ONCE OR TWICE A WEEK
QUESTION_5 LAST FOUR WEEKS HOW WOULD YOU RATE YOUR ASTHMA CONTROL: WELL CONTROLLED
ACT_TOTALSCORE: 22
ACT_TOTALSCORE: 22
QUESTION_3 LAST FOUR WEEKS HOW OFTEN DID YOUR ASTHMA SYMPTOMS (WHEEZING, COUGHING, SHORTNESS OF BREATH, CHEST TIGHTNESS OR PAIN) WAKE YOU UP AT NIGHT OR EARLIER THAN USUAL IN THE MORNING: NOT AT ALL
QUESTION_4 LAST FOUR WEEKS HOW OFTEN HAVE YOU USED YOUR RESCUE INHALER OR NEBULIZER MEDICATION (SUCH AS ALBUTEROL): ONCE A WEEK OR LESS
QUESTION_1 LAST FOUR WEEKS HOW MUCH OF THE TIME DID YOUR ASTHMA KEEP YOU FROM GETTING AS MUCH DONE AT WORK, SCHOOL OR AT HOME: NONE OF THE TIME

## 2022-03-29 ENCOUNTER — TELEPHONE (OUTPATIENT)
Dept: GASTROENTEROLOGY | Facility: CLINIC | Age: 47
End: 2022-03-29
Payer: COMMERCIAL

## 2022-03-29 NOTE — TELEPHONE ENCOUNTER
Screening Questions  BlueKIND OF PREP RedLOCATION [review exclusion criteria] GreenSEDATION TYPE  1. Have you had a positive covid test in the last 90 days? N     2. Are you active on mychart? Y    3. What insurance is in the chart? BCBS     3.   Ordering/Referring Provider: Krista Juares    4. BMI 33.81 [BMI OVER 40-EXTENDED PREP]  If greater than 40 review exclusion criteria [PAC APPT IF @ [facility]]        5.  Respiratory Screening :  [If yes to any of the following HOSPITAL setting only]     Do you use daily home oxygen? N    Do you have mod to severe Obstructive Sleep Apnea? N  [OKAY @ [facility] SH PH [facility]]   Do you have Pulmonary Hypertension? N     Do you have UNCONTROLLED asthma? N  -HAS ASTHMA BUT CONTROLLED       6.   Have you had a heart or lung transplant? N      7.   Are you currently on dialysis? N [ If yes, G-PREP & HOSPITAL setting only]     8.   Do you have chronic kidney disease? N [ If yes, G-PREP ]    9.   Have you had a stroke or Transient ischemic attack (TIA - aka  mini stroke ) within 6 months?  N (If yes, please review exclusion criteria)    10.   In the past 6 months, have you had any heart related issues including cardiomyopathy or heart attack? N           If yes, did it require cardiac stenting or other implantable device? N      11.   Do you have any implantable devices in your body (pacemaker, defib, LVAD)? N (If yes, please review exclusion criteria)    12.   Do you take nitroglycerin? N           If yes, how often? N  (if yes, HOSPITAL setting ONLY)    13.   Are you currently taking any blood thinners? N           [IF YES, INFORM PATIENT TO FOLLOW UP W/ ORDERING PROVIDER FOR BRIDGING INSTRUCTIONS]     14.   Do you have a diagnosis of diabetes? Y TYPE 2   [ If yes, G-PREP ]    15.   [FEMALES] Are you currently pregnant?     If yes, how many weeks?     16.   Are you taking any prescription pain medications on a routine schedule?  N  [ If yes, EXTENDED PREP.] [If yes, MAC]    17.    Do you have any chemical dependencies such as alcohol, street drugs, or methadone?  N [If yes, MAC]    18.   Do you have any history of post-traumatic stress syndrome, severe anxiety or history of psychosis?  N  [If yes, MAC]    19.   Do you have a significant intellectual disability?  N [If yes, MAC]    20.   Do you transfer independently?  Y    21.  On a regular basis do you go 3-5 days between bowel movements? N   [ If yes, EXTENDED PREP.]    22.   Preferred LOCAL Pharmacy for Pre Prescription        [id number] - [place], [place] - [address]  [facility] -   Scheduling Details      Caller : CHERI SCHEDULING  (Please ask for phone number if not scheduled by patient)    Type of Procedure Scheduled: COLON  Which Colonoscopy Prep was Sent?: ESTEPHANIE SO CF PATIENTS & GROEN'S PATIENTS NEEDS EXTENDED PREP  Surgeon: SUNDAY  Date of Procedure: 6/6/22  Location:       Sedation Type: CS  Conscious Sedation- Needs  for 6 hours after the procedure  MAC/General-Needs  for 24 hours after procedure    Pre-op Required at [facility] [place], [place] and OR for MAC sedation:   (advise patient they will need a pre-op prior to procedure -)      Informed patient they will need an adult  Y  Cannot take any type of public or medical transportation alone    [facility]Pro[facility] or [facility]: [facility] 6[facility]3    Confirmed Nurse will call to complete assessment Y    Additional comments:

## 2022-04-01 NOTE — PATIENT INSTRUCTIONS
Screen for colon cancer  Colon cancer options discussed & he is willing to proceed with colonoscopy  - Adult Gastro Ref - Procedure Only; Future    Controlled type 2 diabetes mellitus without complication, without long-term current use of insulin (H)  Ok to stop glpizide & keep monitoring blood sugar    - Comprehensive metabolic panel (BMP + Alb, Alk Phos, ALT, AST, Total. Bili, TP); Future  - Albumin Random Urine Quantitative with Creat Ratio; Future  - Hemoglobin A1c; Future  - liraglutide (VICTOZA PEN) 18 MG/3ML solution; Inject 1.8 mg Subcutaneous daily  - lisinopril (ZESTRIL) 20 MG tablet; Take 1 tablet (20 mg) by mouth daily  - metFORMIN (GLUCOPHAGE-XR) 500 MG 24 hr tablet; TAKE 4 TABLETS (2,000MG)   DAILY WITH FOOD  - simvastatin (ZOCOR) 40 MG tablet; TAKE 1 TABLET (40 MG) BY MOUTH AT BEDTIME    Asthma, persistent controlled  Well controlled.  AAP/ACT completed   - fluticasone-vilanterol (BREO ELLIPTA) 200-25 MCG/INH inhaler; Inhale 1 puff into the lungs daily    Benign essential hypertension  UNcontrolled.-increase lisinopril  From 10 to 20 mgonce daily . Monitor BP and send a note about Blood pressure if in range of < 130/80  - Comprehensive metabolic panel (BMP + Alb, Alk Phos, ALT, AST, Total. Bili, TP); Future  - lisinopril (ZESTRIL) 20 MG tablet; Take 1 tablet (20 mg) by mouth daily    Mixed hyperlipidemia  Continue simvastatin  - Lipid Profile (Chol, Trig, HDL, LDL calc); Future  - simvastatin (ZOCOR) 40 MG tablet; TAKE 1 TABLET (40 MG) BY MOUTH AT BEDTIME  Recent Labs   Lab Test 08/09/21  1110 08/11/20  0849   CHOL 155 138   HDL 50 52   LDL 89 70   TRIG 81 79

## 2022-04-20 ENCOUNTER — MYC MEDICAL ADVICE (OUTPATIENT)
Dept: FAMILY MEDICINE | Facility: CLINIC | Age: 47
End: 2022-04-20
Payer: COMMERCIAL

## 2022-04-26 ENCOUNTER — MYC MEDICAL ADVICE (OUTPATIENT)
Dept: PHARMACY | Facility: CLINIC | Age: 47
End: 2022-04-26
Payer: COMMERCIAL

## 2022-04-26 DIAGNOSIS — E11.9 CONTROLLED TYPE 2 DIABETES MELLITUS WITHOUT COMPLICATION, WITHOUT LONG-TERM CURRENT USE OF INSULIN (H): ICD-10-CM

## 2022-04-29 RX ORDER — PEN NEEDLE, DIABETIC 32GX 5/32"
NEEDLE, DISPOSABLE MISCELLANEOUS
Qty: 100 EACH | Refills: 3 | Status: SHIPPED | OUTPATIENT
Start: 2022-04-29 | End: 2023-11-07

## 2022-04-29 NOTE — TELEPHONE ENCOUNTER
Sent in refills for pen needles per CPA with Krista Juares     Updating patient reported blood pressure to be reflected in patient's chart.    Paula Song, PharmD   Medication Therapy Management   Pharmacist Resident  Pager: [phone]

## 2022-05-15 ENCOUNTER — HEALTH MAINTENANCE LETTER (OUTPATIENT)
Age: 47
End: 2022-05-15

## 2022-05-26 DIAGNOSIS — Z11.59 ENCOUNTER FOR SCREENING FOR OTHER VIRAL DISEASES: Primary | ICD-10-CM

## 2022-06-03 ENCOUNTER — LAB (OUTPATIENT)
Dept: LAB | Facility: CLINIC | Age: 47
End: 2022-06-03
Payer: COMMERCIAL

## 2022-06-03 DIAGNOSIS — Z11.59 ENCOUNTER FOR SCREENING FOR OTHER VIRAL DISEASES: ICD-10-CM

## 2022-06-03 PROCEDURE — U0005 INFEC AGEN DETEC AMPLI PROBE: HCPCS

## 2022-06-03 PROCEDURE — U0003 INFECTIOUS AGENT DETECTION BY NUCLEIC ACID (DNA OR RNA); SEVERE ACUTE RESPIRATORY SYNDROME CORONAVIRUS 2 (SARS-COV-2) (CORONAVIRUS DISEASE [COVID-19]), AMPLIFIED PROBE TECHNIQUE, MAKING USE OF HIGH THROUGHPUT TECHNOLOGIES AS DESCRIBED BY CMS-2020-01-R: HCPCS

## 2022-06-04 LAB — SARS-COV-2 RNA RESP QL NAA+PROBE: NEGATIVE

## 2022-06-06 ENCOUNTER — HOSPITAL ENCOUNTER (OUTPATIENT)
Facility: CLINIC | Age: 47
Discharge: HOME | End: 2022-06-06
Attending: COLON & RECTAL SURGERY | Admitting: COLON & RECTAL SURGERY
Payer: COMMERCIAL

## 2022-06-06 VITALS
BODY MASS INDEX: 33.93 KG/M2 | OXYGEN SATURATION: 99 % | RESPIRATION RATE: 15 BRPM | SYSTOLIC BLOOD PRESSURE: 130 MMHG | HEART RATE: 66 BPM | DIASTOLIC BLOOD PRESSURE: 83 MMHG | HEIGHT: 70 IN | WEIGHT: 237 LBS

## 2022-06-06 DIAGNOSIS — Z12.11 ENCOUNTER FOR SCREENING COLONOSCOPY: Primary | ICD-10-CM

## 2022-06-06 LAB — COLONOSCOPY: NORMAL

## 2022-06-06 PROCEDURE — G0121 COLON CA SCRN NOT HI RSK IND: HCPCS | Performed by: COLON & RECTAL SURGERY

## 2022-06-06 PROCEDURE — 250N000011 HC RX IP 250 OP 636: Performed by: COLON & RECTAL SURGERY

## 2022-06-06 PROCEDURE — G0500 MOD SEDAT ENDO SERVICE >5YRS: HCPCS | Performed by: COLON & RECTAL SURGERY

## 2022-06-06 PROCEDURE — 45378 DIAGNOSTIC COLONOSCOPY: CPT | Performed by: COLON & RECTAL SURGERY

## 2022-06-06 RX ORDER — FENTANYL CITRATE 50 UG/ML
INJECTION, SOLUTION INTRAMUSCULAR; INTRAVENOUS PRN
Status: COMPLETED | OUTPATIENT
Start: 2022-06-06 | End: 2022-06-06

## 2022-06-06 RX ORDER — LIDOCAINE 40 MG/G
CREAM TOPICAL
Status: DISCONTINUED | OUTPATIENT
Start: 2022-06-06 | End: 2022-06-06 | Stop reason: HOSPADM

## 2022-06-06 RX ORDER — GLIPIZIDE 10 MG/1
10 TABLET ORAL
COMMUNITY
End: 2022-09-06

## 2022-06-06 RX ORDER — ONDANSETRON 2 MG/ML
4 INJECTION INTRAMUSCULAR; INTRAVENOUS
Status: DISCONTINUED | OUTPATIENT
Start: 2022-06-06 | End: 2022-06-06 | Stop reason: HOSPADM

## 2022-06-06 RX ADMIN — FENTANYL CITRATE 100 MCG: 50 INJECTION, SOLUTION INTRAMUSCULAR; INTRAVENOUS at 08:39

## 2022-06-06 RX ADMIN — MIDAZOLAM 2 MG: 1 INJECTION INTRAMUSCULAR; INTRAVENOUS at 08:39

## 2022-06-06 NOTE — H&P
Colon & Rectal Surgery History and Physical  Pre-Endoscopy Procedure Note    History of Present Illness   I have been asked by Dr. Juares to evaluate this 46 year old male for colorectal cancer screening. He currently denies any abdominal pain, weight loss, bleeding per rectum, or recent change in bowel habits.    Past Medical History  Diagnosis Date     Benign essential hypertension 04/05/2016     Diabetes mellitus type 2, controlled 04/05/2016     High cholesterol      Uncomplicated asthma        Past Surgical History  Procedure Laterality Date     [place] teeth extraction          Medications  Medication Sig     albuterol (VENTOLIN HFA) 108 (90 Base) MCG/ACT inhaler Inhale 1-2 puffs into the lungs every 6 hours as needed for shortness of breath / dyspnea or wheezing     Ascorbic Acid (VITAMIN C) 100 MG CHEW Take 1 tablet by mouth daily     glipiZIDE (GLUCOTROL) 10 MG tablet Take 10 mg by mouth 2 times daily (before meals)     lactobacillus rhamnosus, GG, (CULTURELL) capsule Take 1 capsule by mouth daily     liraglutide (VICTOZA PEN) 18 MG/3ML solution Inject 1.8 mg Subcutaneous daily     lisinopril (ZESTRIL) 20 MG tablet Take 1 tablet (20 mg) by mouth daily     metFORMIN (GLUCOPHAGE-XR) 500 MG 24 hr tablet TAKE 4 TABLETS (2,000MG)   DAILY WITH FOOD     Multiple Vitamin (MULTIVITAMIN ADULT PO) Take 1 tablet by mouth daily     Omega-3 Fatty Acids (FISH OIL) 1200 MG capsule Take 1,200 mg by mouth daily     simvastatin (ZOCOR) 40 MG tablet TAKE 1 TABLET (40 MG) BY MOUTH AT BEDTIME     vitamin D3 (CHOLECALCIFEROL) 50 mcg (2000 units) tablet Take 1 tablet by mouth daily     ASPIRIN NOT PRESCRIBED (INTENTIONAL) Antiplatelet medication not prescribed intentionally due to Not indicated based on age (Patient not taking: Reported on 3/3/2022)     fluticasone-vilanterol (BREO ELLIPTA) 200-25 MCG/INH inhaler Inhale 1 puff into the lungs daily     triamcinolone (KENALOG) 0.1 % external cream Apply topically 2 times daily as  "needed for irritation (eczema)       Allergies  Allergen Reactions     Dust Mites      Seasonal Allergies         Family History   Family history includes Diabetes in his mother; Hypertension in his mother.     Social History   He reports that he has never smoked. He has never used smokeless tobacco. He reports current alcohol use. He reports that he does not use drugs.    Review of Systems   Constitutional:  No fever, weight change or fatigue.    Eyes:     No dry eyes or vision changes.   Ears/Nose/Throat/Neck:  No oral ulcers, sore throat or voice change.    Cardiovascular:   No palpitations, syncope, angina or edema.   Respiratory:    No chest pain, excessive sleepiness, shortness of breath or hemoptysis.    Gastrointestinal:   No abdominal pain, nausea, vomiting, diarrhea or heartburn.    Genitourinary:   No dysuria, hematuria, urinary retention or urinary frequency.   Musculoskeletal:  No joint swelling or arthralgias.    Dermatologic:  No skin rash or other skin changes.   Neurologic:    No focal weakness or numbness. No neuropathy.   Psychiatric:    No depression, anxiety, suicidal ideation, or paranoid ideation.   Endocrine:   No cold or heat intolerance, polydipsia, hirsutism, change in libido, or flushing.   Hematology/Lymphatic:  No bleeding or lymphadenopathy.    Allergy/Immunology:  No rhinitis or hives.     Physical Exam   Vitals:  /90, HR 67, RR 16, height 1.778 m (5' 10\"), weight 107.5 kg (237 lb), SpO2 100 %.    General:  Alert and oriented to person, place and time   Airway: Normal oropharyngeal airway and neck mobility   Lungs:  Clear bilaterally   Heart:  Regular rate and rhythm   Abdomen: Soft, NT, ND, no masses   Extremities: Warm, good capillary refill    ASA Grade: II (mild systemic disease)    Impression: Cleared for use of conscious sedation for colorectal cancer screening    Plan: Proceed with colonoscopy     Cinthya Lo MD  [facility]  [phone]

## 2022-08-10 ENCOUNTER — LAB (OUTPATIENT)
Dept: LAB | Facility: CLINIC | Age: 47
End: 2022-08-10
Payer: COMMERCIAL

## 2022-08-10 DIAGNOSIS — E78.2 MIXED HYPERLIPIDEMIA: ICD-10-CM

## 2022-08-10 DIAGNOSIS — E11.9 CONTROLLED TYPE 2 DIABETES MELLITUS WITHOUT COMPLICATION, WITHOUT LONG-TERM CURRENT USE OF INSULIN (H): ICD-10-CM

## 2022-08-10 DIAGNOSIS — I10 BENIGN ESSENTIAL HYPERTENSION: ICD-10-CM

## 2022-08-10 LAB
ALBUMIN SERPL-MCNC: 4.2 G/DL (ref 3.4–5)
ALP SERPL-CCNC: 69 U/L (ref 40–150)
ALT SERPL W P-5'-P-CCNC: 30 U/L (ref 0–70)
ANION GAP SERPL CALCULATED.3IONS-SCNC: 10 MMOL/L (ref 3–14)
AST SERPL W P-5'-P-CCNC: 20 U/L (ref 0–45)
BILIRUB SERPL-MCNC: 2.3 MG/DL (ref 0.2–1.3)
BUN SERPL-MCNC: 16 MG/DL (ref 7–30)
CALCIUM SERPL-MCNC: 9.3 MG/DL (ref 8.5–10.1)
CHLORIDE BLD-SCNC: 108 MMOL/L (ref 94–109)
CHOLEST SERPL-MCNC: 129 MG/DL
CO2 SERPL-SCNC: 21 MMOL/L (ref 20–32)
CREAT SERPL-MCNC: 0.96 MG/DL (ref 0.66–1.25)
CREAT UR-MCNC: 115 MG/DL
FASTING STATUS PATIENT QL REPORTED: YES
GFR SERPL CREATININE-BSD FRML MDRD: >90 ML/MIN/1.73M2
GLUCOSE BLD-MCNC: 175 MG/DL (ref 70–99)
HBA1C MFR BLD: 5.8 % (ref 0–5.6)
HDLC SERPL-MCNC: 42 MG/DL
LDLC SERPL CALC-MCNC: 61 MG/DL
MICROALBUMIN UR-MCNC: 30 MG/L
MICROALBUMIN/CREAT UR: 26.09 MG/G CR (ref 0–17)
NONHDLC SERPL-MCNC: 87 MG/DL
POTASSIUM BLD-SCNC: 4.4 MMOL/L (ref 3.4–5.3)
PROT SERPL-MCNC: 7.4 G/DL (ref 6.8–8.8)
SODIUM SERPL-SCNC: 139 MMOL/L (ref 133–144)
TRIGL SERPL-MCNC: 131 MG/DL

## 2022-08-10 PROCEDURE — 80053 COMPREHEN METABOLIC PANEL: CPT

## 2022-08-10 PROCEDURE — 83036 HEMOGLOBIN GLYCOSYLATED A1C: CPT

## 2022-08-10 PROCEDURE — 82043 UR ALBUMIN QUANTITATIVE: CPT

## 2022-08-10 PROCEDURE — 80061 LIPID PANEL: CPT

## 2022-08-10 PROCEDURE — 36415 COLL VENOUS BLD VENIPUNCTURE: CPT

## 2022-09-06 ENCOUNTER — VIRTUAL VISIT (OUTPATIENT)
Dept: PHARMACY | Facility: CLINIC | Age: 47
End: 2022-09-06
Payer: COMMERCIAL

## 2022-09-06 DIAGNOSIS — I10 BENIGN ESSENTIAL HYPERTENSION: ICD-10-CM

## 2022-09-06 DIAGNOSIS — E78.2 MIXED HYPERLIPIDEMIA: ICD-10-CM

## 2022-09-06 DIAGNOSIS — E11.9 CONTROLLED TYPE 2 DIABETES MELLITUS WITHOUT COMPLICATION, WITHOUT LONG-TERM CURRENT USE OF INSULIN (H): Primary | ICD-10-CM

## 2022-09-06 PROCEDURE — 99606 MTMS BY PHARM EST 15 MIN: CPT | Performed by: PHARMACIST

## 2022-09-06 RX ORDER — GLIPIZIDE 5 MG/1
5 TABLET, FILM COATED, EXTENDED RELEASE ORAL DAILY
Qty: 90 TABLET | Refills: 1 | Status: SHIPPED | OUTPATIENT
Start: 2022-09-06 | End: 2023-02-27

## 2022-09-06 NOTE — PROGRESS NOTES
Medication Therapy Management (MTM) Encounter    ASSESSMENT:                            Medication Adherence/Access: No issues identified    Type 2 Diabetes: A1c is at goal. Fasting readings are a little bit above goal, but doesn't make sense to start another medication at this time. Discussed staying off glipizide since he hasn't seen much of a difference, but will refill in case BG starts to increase, then he can restart.     Hypertension: BP at goal per last [email or web address] message on .      Hyperlipidemia: Reviewed results of most recent labs with patient - no changes needed. Lipids at goal, tolerating meds.     PLAN:                            1. Glipizide XL 5mg daily refilled - Please restart this if your 2-hr after meal sugars are consistently above 180.     2. Schedule your annual physical with Dr. Juares before the end of .     Follow-up: 6 months, sooner if needed.     SUBJECTIVE/OBJECTIVE:                          Jeanmarie Mckinley is a 47 year old male called for a follow-up visit.  Today's visit is a follow-up MTM visit from 3/3/2     Reason for visit: follow-up on lab work, medication management.    Allergies/ADRs: Reviewed in chart  Past Medical History: Reviewed in chart  Tobacco: He reports that he has never smoked. He has never used smokeless tobacco.  Alcohol: 1-3 beverages / week    Medication Adherence/Access: no issues reported.  Stores medications in 2 pill boxes (morning and evening).   Misses a dose of medications once every few months.     Type 2 Diabetes: Currently taking glipizide XL 5 mg daily (has been out of this for three weeks), metformin XR 2000 mg daily, and Victoza 1.8 mg daily. Patient is experiencing the following side effects: loose stools (once daily but not bothersome)  Blood sugar monitoring: Ranges (patient reported):   Fastin - 150 mg/dL - hasn't really seen a change in his BG since being off glipizide. Denies s/sx of hypo/hyperglycemia.   Eye exam: up to date  Foot  exam: due (also due for preventative visit with PCP)  Diet: interested in what he can do with diet to help with BG/delay progression.   Exercise: 30 - 60 minutes walks daily    Hypertension: Current medications include lisinopril 20 mg daily. Has checked BP a few times and they have been better since increasing lisinopril (however, it's jen a while since he checked and doesn't have any recent readings today). Patient reports no current medication side effects.    Hyperlipidemia: Current therapy includes simvastatin 40 mg daily at bedtime. Patient reports no significant myalgias or other side effects.  ---------------  I spent 15 minutes with this patient today. All changes were made via collaborative practice agreement with Krista Juares MD. A copy of the visit note was provided to the patient's provider(s).    The patient was sent via [employer] a summary of these recommendations.     Faith Montague, AndersD, VITO, BCACP  MTM Pharmacist, [facility]     Telemedicine Visit Details  Type of service:  Telephone visit  Start Time: 10:06 AM  End Time: 10:21 AM  Originating Location (patient location): Home  Distant Location (provider location):  [facility]     Medication Therapy Recommendations  No medication therapy recommendations to display

## 2022-09-06 NOTE — PATIENT INSTRUCTIONS
"Recommendations from today's MTM visit:                                                       1. Glipizide XL 5mg daily refilled - Please restart this if your 2-hr after meal sugars are consistently above 180.     2. Schedule your annual physical with Dr. Juares before the end of 2022.     Follow-up: 6 months, sooner if needed.     It was great speaking with you today.  I value your experience and would be very thankful for your time in providing feedback in our clinic survey. In the next few days, you may receive an email or text message from [employer] with a link to a survey related to your  clinical pharmacist.\"     To schedule another MTM appointment, please call the clinic directly or you may call the MTM scheduling line at [phone] or toll-free at [phone].     My Clinical Pharmacist's contact information:                                                      Please feel free to contact me with any questions or concerns you have.      Faith Montague, Pharm.D., M.B.A., BCACP  MTM Pharmacist, [facility]  Pager: [phone]  Email: [email or web address]   "

## 2022-09-11 ENCOUNTER — HEALTH MAINTENANCE LETTER (OUTPATIENT)
Age: 47
End: 2022-09-11

## 2022-10-17 ENCOUNTER — IMMUNIZATION (OUTPATIENT)
Dept: FAMILY MEDICINE | Facility: CLINIC | Age: 47
End: 2022-10-17
Payer: COMMERCIAL

## 2022-10-17 DIAGNOSIS — Z23 NEED FOR VACCINATION: Primary | ICD-10-CM

## 2022-10-17 PROCEDURE — 91312 COVID-19,PF,PFIZER BOOSTER BIVALENT: CPT

## 2022-10-17 PROCEDURE — 90686 IIV4 VACC NO PRSV 0.5 ML IM: CPT

## 2022-10-17 PROCEDURE — 90471 IMMUNIZATION ADMIN: CPT

## 2022-10-17 PROCEDURE — 0124A COVID-19,PF,PFIZER BOOSTER BIVALENT: CPT

## 2022-10-17 NOTE — PROGRESS NOTES
Prior to immunization administration, verified patients identity using patient s name and date of birth. Please see Immunization Activity for additional information.     Screening Questionnaire for Adult Immunization    Are you sick today?   No   Do you have allergies to medications, food, a vaccine component or latex?   No   Have you ever had a serious reaction after receiving a vaccination?   No   Do you have a long-term health problem with heart, lung, kidney, or metabolic disease (e.g., diabetes), asthma, a blood disorder, no spleen, complement component deficiency, a cochlear implant, or a spinal fluid leak?  Are you on long-term aspirin therapy?   No   Do you have cancer, leukemia, HIV/AIDS, or any other immune system problem?   No   Do you have a parent, brother, or sister with an immune system problem?   No   In the past 3 months, have you taken medications that affect  your immune system, such as prednisone, other steroids, or anticancer drugs; drugs for the treatment of rheumatoid arthritis, Crohn s disease, or psoriasis; or have you had radiation treatments?   No   Have you had a seizure, or a brain or other nervous system problem?   No   During the past year, have you received a transfusion of blood or blood    products, or been given immune (gamma) globulin or antiviral drug?   No   For women: Are you pregnant or is there a chance you could become       pregnant during the next month?   No   Have you received any vaccinations in the past 4 weeks?   No     Immunization questionnaire answers were all negative.        Per orders of Dr. Taylor, injection of Flu Shot and Bivalent Pfizer given by Radha Burdick CMA. Patient instructed to remain in clinic for 15 minutes afterwards, and to report any adverse reaction to me immediately.       Screening performed by Radha Burdick CMA on 10/17/2022 at 10:23 AM.

## 2022-11-26 ENCOUNTER — MYC MEDICAL ADVICE (OUTPATIENT)
Dept: FAMILY MEDICINE | Facility: CLINIC | Age: 47
End: 2022-11-26

## 2022-11-26 DIAGNOSIS — G89.29 CHRONIC BILATERAL LOW BACK PAIN WITHOUT SCIATICA: Primary | ICD-10-CM

## 2022-11-26 DIAGNOSIS — M54.50 CHRONIC BILATERAL LOW BACK PAIN WITHOUT SCIATICA: Primary | ICD-10-CM

## 2022-11-28 NOTE — TELEPHONE ENCOUNTER
Pt's insurance is [employer] [place] [employer] but there is limited in-network services for acupuncture and therefore, Pt may be seen at [facility].    TIERNEY Kirkland [facility] Referral Rep

## 2022-11-29 NOTE — TELEPHONE ENCOUNTER
AS,  Please see below [email or web address] message and advise.  Pended acupuncture refer to [facility] as advised by referral specialist.  Thanks,  Angelica GORDILLO RN

## 2022-11-30 DIAGNOSIS — E11.9 CONTROLLED TYPE 2 DIABETES MELLITUS WITHOUT COMPLICATION, WITHOUT LONG-TERM CURRENT USE OF INSULIN (H): ICD-10-CM

## 2022-11-30 DIAGNOSIS — I10 BENIGN ESSENTIAL HYPERTENSION: ICD-10-CM

## 2022-12-01 RX ORDER — LISINOPRIL 20 MG/1
TABLET ORAL
Qty: 30 TABLET | Refills: 0 | Status: SHIPPED | OUTPATIENT
Start: 2022-12-01 | End: 2022-12-30

## 2022-12-01 NOTE — TELEPHONE ENCOUNTER
"Requested Prescriptions   Pending Prescriptions Disp Refills     lisinopril (ZESTRIL) 20 MG tablet [Pharmacy Med Name: LISINOPRIL TAB 20MG] 30 tablet 0     Sig: TAKE 1 TABLET DAILY       ACE Inhibitors (Including Combos) Protocol Passed - 11/30/2022  7:18 AM        Passed - Blood pressure under 140/90 in past 12 months     BP Readings from Last 3 Encounters:   06/06/22 130/83   03/28/22 (!) 154/89   08/09/21 132/82                 Passed - Recent (12 mo) or future (30 days) visit within the authorizing provider's specialty     Patient has had an office visit with the authorizing provider or a provider within the authorizing providers department within the previous 12 mos or has a future within next 30 days. See \"Patient Info\" tab in inbasket, or \"Choose Columns\" in Meds & Orders section of the refill encounter.              Passed - Medication is active on med list        Passed - Patient is age 18 or older        Passed - Normal serum creatinine on file in past 12 months     Recent Labs   Lab Test 08/10/22  0909   CR 0.96       Ok to refill medication if creatinine is low          Passed - Normal serum potassium on file in past 12 months     Recent Labs   Lab Test 08/10/22  0909   POTASSIUM 4.4                 Prescription approved per [facility] Refill Protocol.  Lyndsay Denise RN  [facility]      "

## 2022-12-30 DIAGNOSIS — E11.9 CONTROLLED TYPE 2 DIABETES MELLITUS WITHOUT COMPLICATION, WITHOUT LONG-TERM CURRENT USE OF INSULIN (H): ICD-10-CM

## 2022-12-30 DIAGNOSIS — I10 BENIGN ESSENTIAL HYPERTENSION: ICD-10-CM

## 2022-12-30 RX ORDER — LISINOPRIL 20 MG/1
TABLET ORAL
Qty: 30 TABLET | Refills: 0 | Status: SHIPPED | OUTPATIENT
Start: 2022-12-30 | End: 2023-01-30

## 2022-12-30 NOTE — TELEPHONE ENCOUNTER
"Requested Prescriptions   Pending Prescriptions Disp Refills     lisinopril (ZESTRIL) 20 MG tablet [Pharmacy Med Name: LISINOPRIL TAB 20MG] 30 tablet 0     Sig: TAKE 1 TABLET DAILY       ACE Inhibitors (Including Combos) Protocol Passed - 12/30/2022  7:11 AM        Passed - Blood pressure under 140/90 in past 12 months     BP Readings from Last 3 Encounters:   06/06/22 130/83   03/28/22 (!) 154/89   08/09/21 132/82                 Passed - Recent (12 mo) or future (30 days) visit within the authorizing provider's specialty     Patient has had an office visit with the authorizing provider or a provider within the authorizing providers department within the previous 12 mos or has a future within next 30 days. See \"Patient Info\" tab in inbasket, or \"Choose Columns\" in Meds & Orders section of the refill encounter.              Passed - Medication is active on med list        Passed - Patient is age 18 or older        Passed - Normal serum creatinine on file in past 12 months     Recent Labs   Lab Test 08/10/22  0909   CR 0.96       Ok to refill medication if creatinine is low          Passed - Normal serum potassium on file in past 12 months     Recent Labs   Lab Test 08/10/22  0909   POTASSIUM 4.4              Prescription approved per [facility] Refill Protocol.    Kayleigh Hopson RN  [facility]   "

## 2023-01-23 ENCOUNTER — MYC MEDICAL ADVICE (OUTPATIENT)
Dept: FAMILY MEDICINE | Facility: CLINIC | Age: 48
End: 2023-01-23
Payer: COMMERCIAL

## 2023-01-30 DIAGNOSIS — E11.9 CONTROLLED TYPE 2 DIABETES MELLITUS WITHOUT COMPLICATION, WITHOUT LONG-TERM CURRENT USE OF INSULIN (H): ICD-10-CM

## 2023-01-30 DIAGNOSIS — E78.2 MIXED HYPERLIPIDEMIA: ICD-10-CM

## 2023-01-30 NOTE — TELEPHONE ENCOUNTER
Routing refill request to provider for review/approval because:  Please see message from pharmacy.  Angelica COHEN RN

## 2023-01-31 RX ORDER — SIMVASTATIN 40 MG
TABLET ORAL
Qty: 30 TABLET | Refills: 1 | Status: SHIPPED | OUTPATIENT
Start: 2023-01-31 | End: 2023-02-19

## 2023-02-22 DIAGNOSIS — E11.9 CONTROLLED TYPE 2 DIABETES MELLITUS WITHOUT COMPLICATION, WITHOUT LONG-TERM CURRENT USE OF INSULIN (H): ICD-10-CM

## 2023-02-23 RX ORDER — LIRAGLUTIDE 6 MG/ML
INJECTION SUBCUTANEOUS
Qty: 27 ML | Refills: 0 | Status: SHIPPED | OUTPATIENT
Start: 2023-02-23 | End: 2023-02-27

## 2023-02-27 ENCOUNTER — OFFICE VISIT (OUTPATIENT)
Dept: FAMILY MEDICINE | Facility: CLINIC | Age: 48
End: 2023-02-27
Payer: COMMERCIAL

## 2023-02-27 VITALS
OXYGEN SATURATION: 100 % | HEART RATE: 72 BPM | TEMPERATURE: 98.1 F | SYSTOLIC BLOOD PRESSURE: 135 MMHG | WEIGHT: 236 LBS | DIASTOLIC BLOOD PRESSURE: 82 MMHG | BODY MASS INDEX: 33.79 KG/M2 | HEIGHT: 70 IN | RESPIRATION RATE: 18 BRPM

## 2023-02-27 DIAGNOSIS — I10 BENIGN ESSENTIAL HYPERTENSION: ICD-10-CM

## 2023-02-27 DIAGNOSIS — J45.998 ASTHMA, PERSISTENT CONTROLLED: ICD-10-CM

## 2023-02-27 DIAGNOSIS — Z00.00 ROUTINE GENERAL MEDICAL EXAMINATION AT A HEALTH CARE FACILITY: Primary | ICD-10-CM

## 2023-02-27 DIAGNOSIS — E78.2 MIXED HYPERLIPIDEMIA: ICD-10-CM

## 2023-02-27 DIAGNOSIS — R40.0 HAS DAYTIME DROWSINESS: ICD-10-CM

## 2023-02-27 DIAGNOSIS — Z23 NEED FOR HEPATITIS B VACCINATION: ICD-10-CM

## 2023-02-27 DIAGNOSIS — E11.9 CONTROLLED TYPE 2 DIABETES MELLITUS WITHOUT COMPLICATION, WITHOUT LONG-TERM CURRENT USE OF INSULIN (H): ICD-10-CM

## 2023-02-27 LAB — HBA1C MFR BLD: 5.9 % (ref 0–5.6)

## 2023-02-27 PROCEDURE — 99396 PREV VISIT EST AGE 40-64: CPT | Performed by: FAMILY MEDICINE

## 2023-02-27 PROCEDURE — 82043 UR ALBUMIN QUANTITATIVE: CPT | Performed by: FAMILY MEDICINE

## 2023-02-27 PROCEDURE — 86706 HEP B SURFACE ANTIBODY: CPT | Performed by: FAMILY MEDICINE

## 2023-02-27 PROCEDURE — 80048 BASIC METABOLIC PNL TOTAL CA: CPT | Performed by: FAMILY MEDICINE

## 2023-02-27 PROCEDURE — 80061 LIPID PANEL: CPT | Performed by: FAMILY MEDICINE

## 2023-02-27 PROCEDURE — 99207 PR FOOT EXAM NO CHARGE: CPT | Mod: 25 | Performed by: FAMILY MEDICINE

## 2023-02-27 PROCEDURE — 36415 COLL VENOUS BLD VENIPUNCTURE: CPT | Performed by: FAMILY MEDICINE

## 2023-02-27 PROCEDURE — 99214 OFFICE O/P EST MOD 30 MIN: CPT | Mod: 25 | Performed by: FAMILY MEDICINE

## 2023-02-27 PROCEDURE — 87340 HEPATITIS B SURFACE AG IA: CPT | Performed by: FAMILY MEDICINE

## 2023-02-27 PROCEDURE — 82570 ASSAY OF URINE CREATININE: CPT | Performed by: FAMILY MEDICINE

## 2023-02-27 PROCEDURE — 83036 HEMOGLOBIN GLYCOSYLATED A1C: CPT | Performed by: FAMILY MEDICINE

## 2023-02-27 RX ORDER — ALBUTEROL SULFATE 90 UG/1
1-2 AEROSOL, METERED RESPIRATORY (INHALATION) EVERY 6 HOURS PRN
Qty: 3 G | Refills: 3 | COMMUNITY
Start: 2023-02-27 | End: 2024-03-12

## 2023-02-27 RX ORDER — LIRAGLUTIDE 6 MG/ML
INJECTION SUBCUTANEOUS
Qty: 27 ML | Refills: 11 | Status: SHIPPED | OUTPATIENT
Start: 2023-02-27 | End: 2023-04-10 | Stop reason: ALTCHOICE

## 2023-02-27 RX ORDER — FLUTICASONE FUROATE AND VILANTEROL 200; 25 UG/1; UG/1
1 POWDER RESPIRATORY (INHALATION) DAILY
Qty: 3 EACH | Refills: 3 | Status: SHIPPED | OUTPATIENT
Start: 2023-02-27 | End: 2024-03-12

## 2023-02-27 RX ORDER — GLIPIZIDE 5 MG/1
5 TABLET, FILM COATED, EXTENDED RELEASE ORAL DAILY
Qty: 90 TABLET | Refills: 3 | Status: SHIPPED | OUTPATIENT
Start: 2023-02-27 | End: 2023-11-07

## 2023-02-27 RX ORDER — LISINOPRIL 20 MG/1
20 TABLET ORAL DAILY
Qty: 90 TABLET | Refills: 3 | Status: SHIPPED | OUTPATIENT
Start: 2023-02-27 | End: 2024-02-21

## 2023-02-27 RX ORDER — METFORMIN HCL 500 MG
TABLET, EXTENDED RELEASE 24 HR ORAL
Qty: 360 TABLET | Refills: 3 | Status: SHIPPED | OUTPATIENT
Start: 2023-02-27 | End: 2024-03-12

## 2023-02-27 ASSESSMENT — ASTHMA QUESTIONNAIRES
QUESTION_1 LAST FOUR WEEKS HOW MUCH OF THE TIME DID YOUR ASTHMA KEEP YOU FROM GETTING AS MUCH DONE AT WORK, SCHOOL OR AT HOME: NONE OF THE TIME
QUESTION_3 LAST FOUR WEEKS HOW OFTEN DID YOUR ASTHMA SYMPTOMS (WHEEZING, COUGHING, SHORTNESS OF BREATH, CHEST TIGHTNESS OR PAIN) WAKE YOU UP AT NIGHT OR EARLIER THAN USUAL IN THE MORNING: NOT AT ALL
QUESTION_5 LAST FOUR WEEKS HOW WOULD YOU RATE YOUR ASTHMA CONTROL: COMPLETELY CONTROLLED
QUESTION_2 LAST FOUR WEEKS HOW OFTEN HAVE YOU HAD SHORTNESS OF BREATH: ONCE OR TWICE A WEEK
QUESTION_4 LAST FOUR WEEKS HOW OFTEN HAVE YOU USED YOUR RESCUE INHALER OR NEBULIZER MEDICATION (SUCH AS ALBUTEROL): ONCE A WEEK OR LESS
ACT_TOTALSCORE: 23
ACT_TOTALSCORE: 23

## 2023-02-27 ASSESSMENT — ENCOUNTER SYMPTOMS
HEMATOCHEZIA: 0
NAUSEA: 0
ABDOMINAL PAIN: 0
EYE PAIN: 0
ARTHRALGIAS: 0
NERVOUS/ANXIOUS: 0
PARESTHESIAS: 0
FEVER: 0
CHILLS: 0
SORE THROAT: 0
HEMATURIA: 0
CONSTIPATION: 0
DIARRHEA: 0
FREQUENCY: 1
DYSURIA: 0
HEADACHES: 0
HEARTBURN: 0
SHORTNESS OF BREATH: 0
DIZZINESS: 0
MYALGIAS: 0
COUGH: 0
PALPITATIONS: 0
WEAKNESS: 0
JOINT SWELLING: 0

## 2023-02-27 NOTE — PROGRESS NOTES
SUBJECTIVE:   CC: Jeanmarie is an 47 year old who presents for preventative health visit.       Healthy Habits:     Getting at least 3 servings of Calcium per day:  Yes    Bi-annual eye exam:  Yes    Dental care twice a year:  Yes    Sleep apnea or symptoms of sleep apnea:  Daytime drowsiness and Excessive snoring    Diet:  Diabetic and Breakfast skipped    Frequency of exercise:  4-5 days/week    Duration of exercise:  30-45 minutes    Taking medications regularly:  Yes    Medication side effects:  Other    PHQ-2 Total Score: 0    Additional concerns today:  No  SMBG- in pre-meal- 140-180  Before dinner   smbg twice daily to 2-3 /wj  No change in diet and has had intentional weight with victoza helped as well.    Will efraín annual eye check soon    Today's PHQ-2 Score:   PHQ-2 ( 1999 Pfizer) 2/27/2023   Q1: Little interest or pleasure in doing things 0   Q2: Feeling down, depressed or hopeless 0   PHQ-2 Score 0   PHQ-2 Total Score (12-17 Years)- Positive if 3 or more points; Administer PHQ-A if positive -   Q1: Little interest or pleasure in doing things Not at all   Q2: Feeling down, depressed or hopeless Not at all   PHQ-2 Score 0           Social History     Tobacco Use     Smoking status: Never     Smokeless tobacco: Never   Substance Use Topics     Alcohol use: Yes     Alcohol/week: 0.0 standard drinks     Comment: 1 drink per day     If you drink alcohol do you typically have >3 drinks per day or >7 drinks per week? Yes      Alcohol Use 2/27/2023   Prescreen: >3 drinks/day or >7 drinks/week? Yes   AUDIT SCORE  4     AUDIT - Alcohol Use Disorders Identification Test - Reproduced from the World Health Organization Audit 2001 (Second Edition) 2/27/2023   1.  How often do you have a drink containing alcohol? 2 to 3 times a week   2.  How many drinks containing alcohol do you have on a typical day when you are drinking? 1 or 2   3.  How often do you have five or more drinks on one occasion? Less than monthly   4.   How often during the last year have you found that you were not able to stop drinking once you had started? Never   5.  How often during the last year have you failed to do what was normally expected of you because of drinking? Never   6.  How often during the last year have you needed a first drink in the morning to get yourself going after a heavy drinking session? Never   7.  How often during the last year have you had a feeling of guilt or remorse after drinking? Never   8.  How often during the last year have you been unable to remember what happened the night before because of your drinking? Never   9.  Have you or someone else been injured because of your drinking? No   10. Has a relative, friend, doctor or other health care worker been concerned about your drinking or suggested you cut down? No   TOTAL SCORE 4       Last PSA: No results found for: PSA    Reviewed orders with patient. Reviewed health maintenance and updated orders accordingly - Yes  BP Readings from Last 3 Encounters:   02/27/23 135/82   06/06/22 130/83   03/28/22 (!) 154/89    Wt Readings from Last 3 Encounters:   02/27/23 107 kg (236 lb)   06/06/22 107.5 kg (237 lb)   03/28/22 107.1 kg (236 lb 3.2 oz)                    Reviewed and updated as needed this visit by clinical staff   Tobacco  Allergies  Meds  Problems  Med Hx  Surg Hx  Fam Hx          Reviewed and updated as needed this visit by Provider   Tobacco  Allergies  Meds  Problems  Med Hx  Surg Hx  Fam Hx             Review of Systems   Constitutional: Negative for chills and fever.   HENT: Negative for congestion, ear pain, hearing loss and sore throat.    Eyes: Negative for pain and visual disturbance.   Respiratory: Negative for cough and shortness of breath.    Cardiovascular: Negative for chest pain, palpitations and peripheral edema.   Gastrointestinal: Negative for abdominal pain, constipation, diarrhea, heartburn, hematochezia and nausea.   Genitourinary:  "Positive for frequency. Negative for dysuria, genital sores, hematuria, impotence, penile discharge and urgency.   Musculoskeletal: Negative for arthralgias, joint swelling and myalgias.   Skin: Negative for rash.   Neurological: Negative for dizziness, weakness, headaches and paresthesias.   Psychiatric/Behavioral: Negative for mood changes. The patient is not nervous/anxious.      Wt Readings from Last 5 Encounters:   02/27/23 107 kg (236 lb)   06/06/22 107.5 kg (237 lb)   03/28/22 107.1 kg (236 lb 3.2 oz)   08/09/21 107.6 kg (237 lb 2 oz)   08/19/20 103.4 kg (228 lb)       OBJECTIVE:   /82 (BP Location: Left arm, Patient Position: Sitting, Cuff Size: Adult Large)   Pulse 72   Temp 98.1  F (36.7  C) (Temporal)   Resp 18   Ht 1.778 m (5' 10\")   Wt 107 kg (236 lb)   SpO2 100%   BMI 33.86 kg/m      Physical Exam  GENERAL: healthy, alert and no distress  EYES: Eyes grossly normal to inspection, PERRL and conjunctivae and sclerae normal  HENT: ear canals and TM's normal, nose and mouth without ulcers or lesions  NECK: no adenopathy, no asymmetry, masses, or scars and thyroid normal to palpation  RESP: lungs clear to auscultation - no rales, rhonchi or wheezes  CV: regular rate and rhythm, normal S1 S2, no S3 or S4, no murmur, click or rub, no peripheral edema and peripheral pulses strong  ABDOMEN: soft, nontender, no hepatosplenomegaly, no masses and bowel sounds normal  MS: no gross musculoskeletal defects noted, no edema  SKIN: no suspicious lesions or rashes  NEURO: Normal strength and tone, mentation intact and speech normal  PSYCH: mentation appears normal, affect normal/bright  Foot exam -monofilament exam- normal . both sides normal; no swelling, tenderness or skin or vascular lesions. Color and temperature is normal. Sensation is intact. Peripheral pulses are palpable. Toenails are normal.    Diagnostic Test Results:  Labs reviewed in Epic  No results found for this or any previous visit (from the " past 24 hour(s)).    ASSESSMENT/PLAN:   Jeanmarie was seen today for physical.    Diagnoses and all orders for this visit:    Routine general medical examination at a health care facility  Comments:  colonoscopy up to day q 10yrs- next 06/2032    Has daytime drowsiness  -     Adult Sleep Eval & Management  Referral; Future    Controlled type 2 diabetes mellitus without complication, without long-term current use of insulin (H)  -     HEMOGLOBIN A1C; Future  -     Basic metabolic panel  (Ca, Cl, CO2, Creat, Gluc, K, Na, BUN); Future  -     Albumin Random Urine Quantitative with Creat Ratio; Future  -     lisinopril (ZESTRIL) 20 MG tablet; Take 1 tablet (20 mg) by mouth daily  -     liraglutide (VICTOZA PEN) 18 MG/3ML solution; INJECT 1.8MG SUBCUTANEOUSLYDAILY  -     glipiZIDE (GLUCOTROL XL) 5 MG 24 hr tablet; Take 1 tablet (5 mg) by mouth daily  -     metFORMIN (GLUCOPHAGE XR) 500 MG 24 hr tablet; TAKE 4 TABLETS (2,000MG)   DAILY WITH FOOD      -     HEMOGLOBIN A1C  -     Basic metabolic panel  (Ca, Cl, CO2, Creat, Gluc, K, Na, BUN)  -     Albumin Random Urine Quantitative with Creat Ratio  -     FOOT EXAM    Mixed hyperlipidemia  -     Lipid Profile (Chol, Trig, HDL, LDL calc); Future  -     Lipid Profile (Chol, Trig, HDL, LDL calc)  Recent Labs   Lab Test 02/27/23  1600 08/10/22  0909   CHOL 180 129   HDL 48 42   LDL 85 61   TRIG 235* 131         Benign essential hypertension  Comments:  controlled- no med changes  Orders:  -     lisinopril (ZESTRIL) 20 MG tablet; Take 1 tablet (20 mg) by mouth daily  Refill for 1 yr    Asthma, persistent controlled  -ACT- no concerns       fluticasone-vilanterol (BREO ELLIPTA) 200-25 MCG/ACT inhaler; Inhale 1 puff into the lungs daily    Need for hepatitis B vaccination    -     Hepatitis B Surface Antibody-negative   Need to get vaccination at 0,2 & 6 months apart        Patient has been advised of split billing requirements and indicates understanding: Yes      COUNSELING:  "  Reviewed preventive health counseling, as reflected in patient instructions       Regular exercise       Healthy diet/nutrition       Vision screening       Hearing screening       Alcohol Use        Family planning       Safe sex practices/STD prevention      BMI:   Estimated body mass index is 33.86 kg/m  as calculated from the following:    Height as of this encounter: 1.778 m (5' 10\").    Weight as of this encounter: 107 kg (236 lb).   Weight management plan: Discussed healthy diet and exercise guidelines      He reports that he has never smoked. He has never used smokeless tobacco.        Krista Juares MD  [facility]  "

## 2023-02-28 LAB
ANION GAP SERPL CALCULATED.3IONS-SCNC: 13 MMOL/L (ref 7–15)
BUN SERPL-MCNC: 16.7 MG/DL (ref 6–20)
CALCIUM SERPL-MCNC: 10.3 MG/DL (ref 8.6–10)
CHLORIDE SERPL-SCNC: 104 MMOL/L (ref 98–107)
CHOLEST SERPL-MCNC: 180 MG/DL
CREAT SERPL-MCNC: 1.23 MG/DL (ref 0.67–1.17)
CREAT UR-MCNC: 93.9 MG/DL
DEPRECATED HCO3 PLAS-SCNC: 23 MMOL/L (ref 22–29)
GFR SERPL CREATININE-BSD FRML MDRD: 73 ML/MIN/1.73M2
GLUCOSE SERPL-MCNC: 164 MG/DL (ref 70–99)
HDLC SERPL-MCNC: 48 MG/DL
LDLC SERPL CALC-MCNC: 85 MG/DL
MICROALBUMIN UR-MCNC: 30.3 MG/L
MICROALBUMIN/CREAT UR: 32.27 MG/G CR (ref 0–17)
NONHDLC SERPL-MCNC: 132 MG/DL
POTASSIUM SERPL-SCNC: 4.3 MMOL/L (ref 3.4–5.3)
SODIUM SERPL-SCNC: 140 MMOL/L (ref 136–145)
TRIGL SERPL-MCNC: 235 MG/DL

## 2023-03-01 LAB
HBV SURFACE AB SERPL IA-ACNC: 0.26 M[IU]/ML
HBV SURFACE AB SERPL IA-ACNC: NONREACTIVE M[IU]/ML
HBV SURFACE AG SERPL QL IA: NONREACTIVE

## 2023-03-06 PROBLEM — R40.0 HAS DAYTIME DROWSINESS: Status: ACTIVE | Noted: 2023-03-06

## 2023-03-31 ENCOUNTER — TELEPHONE (OUTPATIENT)
Dept: OPHTHALMOLOGY | Facility: CLINIC | Age: 48
End: 2023-03-31
Payer: COMMERCIAL

## 2023-03-31 NOTE — TELEPHONE ENCOUNTER
Appointment Request From: Jeanmarie Mckinley     With Provider: Rosalee Caputo MD [[facility]]     Preferred Date Range: 4/13/2023 - 4/20/2023     Preferred Times: Any Time     Reason for visit: Request an Appointment     Comments:  Checkup for diabetes       --    Above per [facility] request    Dr. Caputo Ophthalmology resident and does not currently have own schedule    H/o diabetes without retinopathy and glaucoma suspect and last seen in 2021    Ok to schedule with Dr. Hercules, Dr. Rosa or Dr. Jones for exam    Note to patient communicator to assist in scheduling    Terrence Franz RN 8:49 AM 03/31/23

## 2023-04-03 ENCOUNTER — TELEPHONE (OUTPATIENT)
Dept: OPHTHALMOLOGY | Facility: CLINIC | Age: 48
End: 2023-04-03
Payer: COMMERCIAL

## 2023-04-03 NOTE — TELEPHONE ENCOUNTER
Ok to schedule with Dr. Hercules, Dr. Rosa or Dr. Jones for exam       Called and informed pt     Ciera Moran Communication Facilitator on 4/3/2023 at 12:47 PM

## 2023-04-06 ENCOUNTER — VIRTUAL VISIT (OUTPATIENT)
Dept: PHARMACY | Facility: CLINIC | Age: 48
End: 2023-04-06
Payer: COMMERCIAL

## 2023-04-06 DIAGNOSIS — E78.2 MIXED HYPERLIPIDEMIA: ICD-10-CM

## 2023-04-06 DIAGNOSIS — I10 BENIGN ESSENTIAL HYPERTENSION: ICD-10-CM

## 2023-04-06 DIAGNOSIS — E11.9 CONTROLLED TYPE 2 DIABETES MELLITUS WITHOUT COMPLICATION, WITHOUT LONG-TERM CURRENT USE OF INSULIN (H): Primary | ICD-10-CM

## 2023-04-06 PROCEDURE — 99605 MTMS BY PHARM NP 15 MIN: CPT | Performed by: PHARMACIST

## 2023-04-06 RX ORDER — UBIDECARENONE 100 MG
100 CAPSULE ORAL DAILY
COMMUNITY

## 2023-04-06 RX ORDER — ATORVASTATIN CALCIUM 40 MG/1
40 TABLET, FILM COATED ORAL DAILY
Qty: 90 TABLET | Refills: 0 | Status: SHIPPED | OUTPATIENT
Start: 2023-04-06 | End: 2023-07-03

## 2023-04-06 NOTE — PROGRESS NOTES
Medication Therapy Management (MTM) Encounter    ASSESSMENT:                            Medication Adherence/Access: No issues identified    Type 2 Diabetes: A1c currently at goal less than 7%.  Would be good to optimize his diabetes regimen and change his GLP1 to something more effective.  We will switch to Ozempic and see if we can stop glipizide to help him move to the medications that have CV and renal benefit.  Ozempic may also help him with a little additional weight loss compared to Victoza.  He is appropriately not taking aspirin.     Hypertension: Stable last several blood pressures in clinic at goal.    Hyperlipidemia: Would benefit from changing to atorvastatin as this is safer as he ages.    PLAN:                            1. Finish simvastatin and start atorvastatin 40mg. This cholesterol medicine is a bit more effective and has fewer drug interactions compared to simvastatin and is a better choice long term.     2. Finish up the Victoza you have and then let's switch you to Ozempic. This is a little bit stronger when it comes to lowering your blood sugars and helping with weight loss. We start with 0.25mg once a week for 4 weeks, then increase to 0.5mg weekly for 6 weeks, then to 1mg weekly thereafter. There is an optional increase to 2mg after another month to help with weight loss (the majority of the effect on your blood sugars will happen at the 1mg dose).     Follow-up: in 1-2 months by spenser to check in on Ozempic    SUBJECTIVE/OBJECTIVE:                          Jeanmarie Mckinley is a 47 year old male called for a follow-up visit.  Today's visit is a follow-up MTM visit from 9/6/22.     Reason for visit: follow-up medication review.    Allergies/ADRs: Reviewed in chart  Past Medical History: Reviewed in chart  Tobacco: He reports that he has never smoked. He has never used smokeless tobacco.  Alcohol: 1-3 beverages / week    Medication Adherence/Access: no issues reported    Type 2 Diabetes:  Currently taking glipizide XL 5 mg daily, metformin XR 2000 mg daily, and Victoza 1.8 mg daily. Patient is experiencing the following side effects: loose stools (once daily but not bothersome).  Denies s/sx of hypo/hyperglycemia.   Eye exam: scheduled for 4/12  Foot exam: UTD  Diet: Interested in optimizing medications, weight loss. Continues to work on carb conscious diet.    Exercise: 30 - 60 minutes walks daily    Hypertension: Current medications include lisinopril 20 mg daily.  Patient reports no current medication side effects.    Hyperlipidemia: Current therapy includes simvastatin 40 mg daily at bedtime. Patient reports no significant myalgias or other side effects.  ----------------  I spent 14 minutes with this patient today. All changes were made via collaborative practice agreement with Krista Juares MD. A copy of the visit note was provided to the patient's provider(s).    A summary of these recommendations was sent via [employer].    Faith Montague, AndersD, VITO, BCACP  MTM Pharmacist, [facility]     Telemedicine Visit Details  Type of service:  Telephone visit  Start Time: 10:41 AM  End Time: 10:55 AM     Medication Therapy Recommendations  Controlled type 2 diabetes mellitus without complication, without long-term current use of insulin (H)    Current Medication: liraglutide (VICTOZA PEN) 18 MG/3ML solution (Discontinued)   Rationale: More effective medication available - Ineffective medication - Effectiveness   Recommendation: Change Medication - Ozempic (0.25 or 0.5 MG/DOSE) 2 MG/1.5ML Sopn   Status: Accepted per CPA         Mixed hyperlipidemia    Current Medication: simvastatin (ZOCOR) 40 MG tablet (Discontinued)   Rationale: More effective medication available - Ineffective medication - Effectiveness   Recommendation: Change Medication - atorvastatin 40 MG tablet   Status: Accepted per CPA

## 2023-04-10 RX ORDER — SEMAGLUTIDE 0.68 MG/ML
0.25 INJECTION, SOLUTION SUBCUTANEOUS WEEKLY
Qty: 3 ML | Refills: 1 | Status: SHIPPED | OUTPATIENT
Start: 2023-04-10 | End: 2023-07-06

## 2023-04-11 NOTE — PATIENT INSTRUCTIONS
"Recommendations from today's MTM visit:                                                       1. Finish simvastatin and start atorvastatin 40mg. This cholesterol medicine is a bit more effective and has fewer drug interactions compared to simvastatin and is a better choice long term.     2. Finish up the Victoza you have and then let's switch you to Ozempic. This is a little bit stronger when it comes to lowering your blood sugars and helping with weight loss. We start with 0.25mg once a week for 4 weeks, then increase to 0.5mg weekly for 6 weeks, then to 1mg weekly thereafter. There is an optional increase to 2mg after another month to help with weight loss (the majority of the effect on your blood sugars will happen at the 1mg dose).     Follow-up: in 1-2 months by spenser to check in on Ozempic    It was great speaking with you today.  I value your experience and would be very thankful for your time in providing feedback in our clinic survey. In the next few days, you may receive an email or text message from [employer] with a link to a survey related to your  clinical pharmacist.\"     To schedule another MTM appointment, please call the clinic directly or you may call the MTM scheduling line at [phone] or toll-free at [phone].     My Clinical Pharmacist's contact information:                                                      Please feel free to contact me with any questions or concerns you have.      Faith Montague, Pharm.D., M.B.A., [facility]  MTM Pharmacist, [facility]  Pager: [phone]  Email: [email or web address]      "

## 2023-04-12 ENCOUNTER — OFFICE VISIT (OUTPATIENT)
Dept: OPHTHALMOLOGY | Facility: CLINIC | Age: 48
End: 2023-04-12
Attending: STUDENT IN AN ORGANIZED HEALTH CARE EDUCATION/TRAINING PROGRAM
Payer: COMMERCIAL

## 2023-04-12 DIAGNOSIS — H40.003 GLAUCOMA SUSPECT, BILATERAL: ICD-10-CM

## 2023-04-12 DIAGNOSIS — H52.11 MYOPIA OF RIGHT EYE WITH ASTIGMATISM AND PRESBYOPIA: ICD-10-CM

## 2023-04-12 DIAGNOSIS — H52.4 MYOPIA OF RIGHT EYE WITH ASTIGMATISM AND PRESBYOPIA: ICD-10-CM

## 2023-04-12 DIAGNOSIS — H52.12 MYOPIA WITH PRESBYOPIA, LEFT: ICD-10-CM

## 2023-04-12 DIAGNOSIS — H52.201 MYOPIA OF RIGHT EYE WITH ASTIGMATISM AND PRESBYOPIA: ICD-10-CM

## 2023-04-12 DIAGNOSIS — E11.9 DIABETES MELLITUS TYPE 2 WITHOUT RETINOPATHY (H): Primary | ICD-10-CM

## 2023-04-12 DIAGNOSIS — H52.4 MYOPIA WITH PRESBYOPIA, LEFT: ICD-10-CM

## 2023-04-12 DIAGNOSIS — H04.123 DRY EYES, BILATERAL: ICD-10-CM

## 2023-04-12 PROCEDURE — 92015 DETERMINE REFRACTIVE STATE: CPT

## 2023-04-12 PROCEDURE — 92133 CPTRZD OPH DX IMG PST SGM ON: CPT | Performed by: STUDENT IN AN ORGANIZED HEALTH CARE EDUCATION/TRAINING PROGRAM

## 2023-04-12 PROCEDURE — 99213 OFFICE O/P EST LOW 20 MIN: CPT | Performed by: STUDENT IN AN ORGANIZED HEALTH CARE EDUCATION/TRAINING PROGRAM

## 2023-04-12 PROCEDURE — 92014 COMPRE OPH EXAM EST PT 1/>: CPT | Performed by: STUDENT IN AN ORGANIZED HEALTH CARE EDUCATION/TRAINING PROGRAM

## 2023-04-12 ASSESSMENT — CUP TO DISC RATIO
OD_RATIO: 0.75
OS_RATIO: 0.7

## 2023-04-12 ASSESSMENT — VISUAL ACUITY
OS_SC: 20/20
OD_SC: 20/40
METHOD: SNELLEN - LINEAR
OS_SC+: -2
OD_PH_SC: 20/20

## 2023-04-12 ASSESSMENT — TONOMETRY
OD_IOP_MMHG: 19
OS_IOP_MMHG: 17
IOP_METHOD: TONOPEN

## 2023-04-12 ASSESSMENT — CONF VISUAL FIELD
OS_INFERIOR_NASAL_RESTRICTION: 0
OS_SUPERIOR_TEMPORAL_RESTRICTION: 0
METHOD: COUNTING FINGERS
OS_SUPERIOR_NASAL_RESTRICTION: 0
OD_INFERIOR_TEMPORAL_RESTRICTION: 0
OD_SUPERIOR_TEMPORAL_RESTRICTION: 0
OD_NORMAL: 1
OD_SUPERIOR_NASAL_RESTRICTION: 0
OS_INFERIOR_TEMPORAL_RESTRICTION: 0
OD_INFERIOR_NASAL_RESTRICTION: 0
OS_NORMAL: 1

## 2023-04-12 ASSESSMENT — EXTERNAL EXAM - RIGHT EYE: OD_EXAM: NORMAL

## 2023-04-12 ASSESSMENT — SLIT LAMP EXAM - LIDS
COMMENTS: NORMAL
COMMENTS: NORMAL

## 2023-04-12 ASSESSMENT — REFRACTION_MANIFEST
OS_SPHERE: -0.50
OD_ADD: +1.75
OD_SPHERE: -1.00
OD_AXIS: 010
OS_ADD: +1.75
OD_CYLINDER: +0.25

## 2023-04-12 ASSESSMENT — EXTERNAL EXAM - LEFT EYE: OS_EXAM: NORMAL

## 2023-04-12 NOTE — NURSING NOTE
Chief Complaints and History of Present Illnesses   Patient presents with     Diabetic Eye Exam     Chief Complaint(s) and History of Present Illness(es)     Diabetic Eye Exam            Vision: is stable    Treatments tried: artificial tears    Pain scale: 0/10          Comments    New patient is here for DM eye exam.    The patient experiences intermittent itchy and painful eyes that lasts 24 to 48 hours.   The patient notes when he has the itchy painful eye/eyes he uses artificial tears.  He reports stable vision.  Lab Results       Component                Value               Date                       A1C                      5.9                 02/27/2023                 A1C                      5.8                 08/10/2022                 A1C                      5.7                 03/09/2022                 A1C                      5.1                 08/09/2021                 A1C                      7.4                 01/28/2021                 A1C                      5.1                 08/11/2020                 A1C                      6.1                 07/15/2019                 A1C                      7.8                 02/21/2019                 A1C                      5.6                 06/25/2018             Martha Denny, COA, COA 2:05 PM 04/12/2023

## 2023-04-12 NOTE — PROGRESS NOTES
HPI     Diabetic Eye Exam    Vision is stable.  Treatments tried include artificial tears.  Pain was noted as 0/10.           Comments    New patient is here for DM eye exam.    The patient notes when he has the itchy painful eye/eyes he uses artificial tears with improvement.  He reports stable vision.    Lab Results         Component                Value               Date                       A1C                      5.9                 02/27/2023                 A1C                      5.8                 08/10/2022                 A1C                      5.7                 03/09/2022                 A1C                      5.1                 08/09/2021                 A1C                      7.4                 01/28/2021                 A1C                      5.1                 08/11/2020                 A1C                      6.1                 07/15/2019                 A1C                      7.8                 02/21/2019                 A1C                      5.6                 06/25/2018             Martha Denny COA, COA 2:05 PM 04/12/2023             Last edited by Suzanne Hercules MD on 4/12/2023  3:43 PM.          Review of systems for the eyes was negative other than the pertinent positives/negatives listed in the HPI.    Ocular Meds: none    Ocular Hx: Glaucoma suspect OU    FOHx: Uncle blind but unsure what from (glaucoma vs diabetes)    PMHx:     Past Medical History:   Diagnosis Date     Benign essential hypertension 04/05/2016    control -lisinorpril 10 mg once a day      Diabetes mellitus type 2, controlled (H) 04/05/2016    Metformin 2000/day SEE MTM SEE EYE      High cholesterol      Uncomplicated asthma        Assessment & Plan      Jeanmarie Mckinley is a 47 year old male with the following diagnoses:    1. Diabetes mellitus type 2 without retinopathy (H)    2. Glaucoma suspect, bilateral    3. Dry eyes, bilateral    4. Myopia of right eye with astigmatism and  presbyopia    5. Myopia with presbyopia, left       T2DM without Retinopathy OU  - strict BP/BG control  - patient understands importance  of good blood glucose control in order to reduce the risk of developing diabetic retinopathy  - yearly DFE    Glaucoma Suspect OU  - based off of increased c/d ratio  - stable nerve appearance. Normal OCT RNFL today and stable to prior and normal VF 12/23/21. IOP wnl. Large disc with large cup, reassuring.  - can observe yearly    Dry Eyes OU  - PFATs QID and PRN OU    Myopia of right eye with astigmatism and prebyopia  Myopia with presbyopia, left eye  - refraction reviewed and patient not interested at this time as happy without Rx    Return precautions reviewed    Patient disposition:   Return in about 1 year (around 4/12/2024) for Annual Visit, OCT RNFL, or sooner changes.    Attending Physician Attestation:  Complete documentation of historical and exam elements from today's encounter can be found in the full encounter summary report (not reduplicated in this progress note).  I personally obtained the chief complaint(s) and history of present illness.  I confirmed and edited as necessary the review of systems, past medical/surgical history, family history, social history, and examination findings as documented by others; and I examined the patient myself.  I personally reviewed the relevant tests, images, and reports as documented above.  I formulated and edited as necessary the assessment and plan and discussed the findings and management plan with the patient and family. . - Suzanne Hercules MD

## 2023-06-21 ENCOUNTER — OFFICE VISIT (OUTPATIENT)
Dept: FAMILY MEDICINE | Facility: CLINIC | Age: 48
End: 2023-06-21
Payer: COMMERCIAL

## 2023-06-21 VITALS
HEIGHT: 71 IN | RESPIRATION RATE: 16 BRPM | HEART RATE: 73 BPM | BODY MASS INDEX: 33.18 KG/M2 | SYSTOLIC BLOOD PRESSURE: 135 MMHG | WEIGHT: 237 LBS | DIASTOLIC BLOOD PRESSURE: 86 MMHG | TEMPERATURE: 97.9 F | OXYGEN SATURATION: 98 %

## 2023-06-21 DIAGNOSIS — Z01.84 IMMUNITY STATUS TESTING: ICD-10-CM

## 2023-06-21 DIAGNOSIS — Z71.84 TRAVEL ADVICE ENCOUNTER: Primary | ICD-10-CM

## 2023-06-21 LAB
ABO/RH(D): NORMAL
MEV IGG SER IA-ACNC: >300 AU/ML
MEV IGG SER IA-ACNC: POSITIVE
MUMPS ANTIBODY IGG INSTRUMENT VALUE: 98.4 AU/ML
MUV IGG SER QL IA: POSITIVE
RUBV IGG SERPL QL IA: 4.04 INDEX
RUBV IGG SERPL QL IA: POSITIVE
SPECIMEN EXPIRATION DATE: NORMAL

## 2023-06-21 PROCEDURE — 90713 POLIOVIRUS IPV SC/IM: CPT | Mod: GA | Performed by: NURSE PRACTITIONER

## 2023-06-21 PROCEDURE — 86762 RUBELLA ANTIBODY: CPT | Mod: GA | Performed by: NURSE PRACTITIONER

## 2023-06-21 PROCEDURE — 86901 BLOOD TYPING SEROLOGIC RH(D): CPT | Mod: GA | Performed by: NURSE PRACTITIONER

## 2023-06-21 PROCEDURE — 90471 IMMUNIZATION ADMIN: CPT | Mod: GA | Performed by: NURSE PRACTITIONER

## 2023-06-21 PROCEDURE — 36415 COLL VENOUS BLD VENIPUNCTURE: CPT | Mod: GA | Performed by: NURSE PRACTITIONER

## 2023-06-21 PROCEDURE — 90636 HEP A/HEP B VACC ADULT IM: CPT | Mod: GA | Performed by: NURSE PRACTITIONER

## 2023-06-21 PROCEDURE — 86765 RUBEOLA ANTIBODY: CPT | Mod: GA | Performed by: NURSE PRACTITIONER

## 2023-06-21 PROCEDURE — 90472 IMMUNIZATION ADMIN EACH ADD: CPT | Mod: GA | Performed by: NURSE PRACTITIONER

## 2023-06-21 PROCEDURE — 90691 TYPHOID VACCINE IM: CPT | Mod: GA | Performed by: NURSE PRACTITIONER

## 2023-06-21 PROCEDURE — 86735 MUMPS ANTIBODY: CPT | Mod: GA | Performed by: NURSE PRACTITIONER

## 2023-06-21 PROCEDURE — 90717 YELLOW FEVER VACCINE SUBQ: CPT | Mod: GA | Performed by: NURSE PRACTITIONER

## 2023-06-21 PROCEDURE — 86900 BLOOD TYPING SEROLOGIC ABO: CPT | Mod: GA | Performed by: NURSE PRACTITIONER

## 2023-06-21 PROCEDURE — 99402 PREV MED CNSL INDIV APPRX 30: CPT | Mod: 25 | Performed by: NURSE PRACTITIONER

## 2023-06-21 RX ORDER — ATOVAQUONE AND PROGUANIL HYDROCHLORIDE 250; 100 MG/1; MG/1
1 TABLET, FILM COATED ORAL DAILY
Qty: 25 TABLET | Refills: 0 | Status: SHIPPED | OUTPATIENT
Start: 2023-06-21 | End: 2023-11-07

## 2023-06-21 RX ORDER — AZITHROMYCIN 500 MG/1
500 TABLET, FILM COATED ORAL DAILY
Qty: 3 TABLET | Refills: 0 | Status: SHIPPED | OUTPATIENT
Start: 2023-06-21 | End: 2023-06-24

## 2023-06-21 ASSESSMENT — PAIN SCALES - GENERAL: PAINLEVEL: NO PAIN (0)

## 2023-06-21 NOTE — NURSING NOTE
Per orders of Mel Gómez, injection of Typhoid, IPV, Twinrix, YF given by Kierra Oswald RN. Prior to immunization administration, verified patients identity using patient s name and date of birth. Patient instructed to remain in clinic for 15 minutes afterwards, and to report any adverse reaction to me or clinic staff immediately.    Kierra Oswald RN on 6/21/2023 at 9:06 AM.    Please see Immunization Activity for additional information.

## 2023-06-21 NOTE — PROGRESS NOTES
"Nurse Note ( Pre-Travel Consult)      Itinerary:  [facility]      Departure Date: 7/27/23      Return Date: 8/7/23      Length of Trip 10 days      Reason for Travel: Tourism    Study abroad           Urban or rural: both      Accommodations: Hotel        IMMUNIZATION HISTORY  Have you received any immunizations within the past 4 weeks?  No  Have you ever fainted from having your blood drawn or from an injection?  No  Have you ever had a fever reaction to vaccination?  No  Have you ever had any bad reaction or side effect from any vaccination?  No  Have you ever had hepatitis A or B vaccine?  unsure  Do you live (or work closely) with anyone who has AIDS, an AIDS-like condition, any other immune disorder or who is on chemotherapy for cancer?  No  Do you have a family history of immunodeficiency?  No  Have you received any injection of immune globulin or any blood products during the past 12 months?  No    Patient roomed by Shekhar Prado  Jeanmarie Mckinley is a 47 year old male seen today alone for counsultation for international travel.   Patient will be departing in  5 week(s)  Patient itinerary :  will be in the urban  region of [facility] and [facility] which risk for Malaria, Yellow Fever, food borne illnesses and motor vehicle accidents. exposure.      Patient's activities will include sightseeing and education.    Patient's country of birth is [place]    Special medical concerns: asthma and diabetes 2  Pre-travel questionnaire was completed by patient and reviewed by provider.     Vitals: /86   Pulse 73   Temp 97.9  F (36.6  C) (Temporal)   Resp 16   Ht 1.791 m (5' 10.5\")   Wt 107.5 kg (237 lb)   SpO2 98%   BMI 33.53 kg/m    BMI= Body mass index is 33.53 kg/m .    EXAM:  General:  Well-nourished, well-developed in no acute distress.  Appears to be stated age, interacts appropriately and expresses understanding of information given to patient.    Current Outpatient Medications   Medication " Sig Dispense Refill     atovaquone-proguanil (MALARONE) 250-100 MG tablet Take 1 tablet by mouth daily Start 2 days before exposure to Malaria and continue daily till  7 days after exposure. 25 tablet 0     azithromycin (ZITHROMAX) 500 MG tablet Take 1 tablet (500 mg) by mouth daily for 3 doses Take 1 tablet a day for up to 3 days for severe diarrhea 3 tablet 0     albuterol (VENTOLIN HFA) 108 (90 Base) MCG/ACT inhaler Inhale 1-2 puffs into the lungs every 6 hours as needed for shortness of breath or wheezing 3 g 3     ASPIRIN NOT PRESCRIBED (INTENTIONAL) Antiplatelet medication not prescribed intentionally due to Not indicated based on age  0     atorvastatin (LIPITOR) 40 MG tablet Take 1 tablet (40 mg) by mouth daily 90 tablet 0     blood glucose (ACCU-CHEK FASTCLIX) lancing device Lancing device to be used with lancets. 1 each 0     blood glucose (NO BRAND SPECIFIED) test strip Use to test blood sugar 2 times daily or as directed. 200 strip 0     co-enzyme Q-10 100 MG CAPS capsule Take 100 mg by mouth daily       fluticasone-vilanterol (BREO ELLIPTA) 200-25 MCG/ACT inhaler Inhale 1 puff into the lungs daily 3 each 3     glipiZIDE (GLUCOTROL XL) 5 MG 24 hr tablet Take 1 tablet (5 mg) by mouth daily 90 tablet 3     insulin pen needle (BD VICENTE U/F) 32G X 4 MM miscellaneous USE ONCE DAILY AS DIRECTED. 100 each 3     lisinopril (ZESTRIL) 20 MG tablet Take 1 tablet (20 mg) by mouth daily 90 tablet 3     metFORMIN (GLUCOPHAGE XR) 500 MG 24 hr tablet TAKE 4 TABLETS (2,000MG)   DAILY WITH FOOD 360 tablet 3     Multiple Vitamin (MULTIVITAMIN ADULT PO) Take 1 tablet by mouth daily       Omega-3 Fatty Acids (FISH OIL) 1200 MG capsule Take 1,200 mg by mouth daily       semaglutide (OZEMPIC, 0.25 OR 0.5 MG/DOSE,) 2 MG/3ML SOPN pen Inject 0.25 mg Subcutaneous once a week For 4 weeks, then increase to 0.5mg once weekly 3 mL 1     triamcinolone (KENALOG) 0.1 % external cream Apply topically 2 times daily as needed for irritation  (eczema) 80 g 0     TURMERIC CURCUMIN PO Take 1 tablet by mouth daily       vitamin D3 (CHOLECALCIFEROL) 50 mcg (2000 units) tablet Take 1 tablet by mouth daily       Patient Active Problem List   Diagnosis     Asthma, persistent controlled     Benign essential hypertension     Mixed hyperlipidemia     BMI 34.0-34.9,adult     Fish allergy     Acute bilateral low back pain without sciatica     Somatic dysfunction of sacral region     Sacral pain     Thoracic segment dysfunction     Poor posture     Spasm of back muscles     Controlled type 2 diabetes mellitus without complication, without long-term current use of insulin (H)     Has daytime drowsiness     Allergies   Allergen Reactions     Dust Mites      Seasonal Allergies          Immunizations discussed include:   Covid 19: Up to date  Hepatitis A:  Twin Jennyfer series started today  Hepatitis B: Twin Jennyfer series started today  Influenza: vaccine is not available  Typhoid: Ordered/given today, risks, benefits and side effects reviewed  Rabies: Declined  reviewed managment of a animal bite or scratch (washing wound, seek medical care within 24 hours for post exposure prophylaxis )  Yellow Fever: Yellow Fever ordered/given today - side effects, precautions, allergies, risks discussed. Patient expressed understanding.  Senegalese Encephalitis: Not indicated  Meningococcus: low risk, off season, short trip  Tetanus/Diphtheria: Up to date  Measles/Mumps/Rubella: Titers drawn  Cholera: Not needed  Polio: Ordered/given today, risks, benefits and side effects reviewed  Pneumococcal: Up to date  Varicella: Immune by disease history per patient report  Shingrix: Not indicated  HPV:  Not indicated     TB: low risk short stay     Altitude Exposure on this trip: no  Past tolerance to Altitude: na    ASSESSMENT/PLAN:  Jeanmarie was seen today for travel clinic.    Diagnoses and all orders for this visit:    Travel advice encounter  -     ABO and Rh; Future  -     YELLOW FEVER, LIVE SQ  -      HEP A & B (TWINRIX); Standing  -     TYPHOID VACCINE, IM  -     HEP A & B (TWINRIX)  -     atovaquone-proguanil (MALARONE) 250-100 MG tablet; Take 1 tablet by mouth daily Start 2 days before exposure to Malaria and continue daily till  7 days after exposure.  -     azithromycin (ZITHROMAX) 500 MG tablet; Take 1 tablet (500 mg) by mouth daily for 3 doses Take 1 tablet a day for up to 3 days for severe diarrhea  -     ABO and Rh    Immunity status testing  -     Rubeola Antibody IgG; Future  -     Rubella Antibody IgG; Future  -     Mumps Immune Status, IgG; Future  -     Mumps Immune Status, IgG  -     Rubella Antibody IgG  -     Rubeola Antibody IgG    Other orders  -     POLIOVIRUS 6W-18Y (IPOL)      I have reviewed general recommendations for safe travel   including: food/water precautions, insect precautions, safer sex   practices given high prevalence of Zika, HIV and other STDs,   roadway safety. Educational materials and [employer] report provided.    Malaraia prophylaxis recommended: Malarone  Symptomatic treatment for traveler's diarrhea: azithromycin        Evacuation insurance advised and resources were provided to patient.    Total visit time 30 minutes  with over 50% of time spent counseling patient and shared decision making as detailed above.    Mel Gómez CNP  Certificate in Travel Health

## 2023-06-21 NOTE — PATIENT INSTRUCTIONS
Thank you for visiting the [facility] : [phone]  Today June 21, 2023 you received the    Twinrix (Hepatitis A & B combo) Vaccine - Please return on 7/21/23 for your 2nd dose and 12/18/23 or later for your 3rd and final dose.    Yellow Fever (YF)    Polio (IPV) Vaccine    Typhoid - injectable. This vaccine is valid for two years.       Possibly MMR depending on blood test       Follow up vaccine appointments can be made as a NURSE ONLY visit at the Travel Clinic, (BE PREPARED TO WAIT, ) or at designated [facility].    If you are receiving the Rabies vaccines series, it is important that you follow the exact schedule ordered.     Pre-travel     We recommend that you purchase Medical Evacuation Insurance prior to your departure.  Https://wwwnc.cdc.gov/travel/page/insurance    Brush your travel plans with the [employer] Department of [employer] through STEP ( Smart Traveler Enrollment Program ) https://step.state.gov.  STEP is a free service to allow [place] citizens and nationals traveling and living abroad to enroll their trip with the nearest [place] Embassy or Consulate.    Animal Exposure: Avoid all mammals even if they look healthy.  If there is a bite, scratch or even a lick, wash area immediately with soap and water for 15 minutes and seek medical care within 24 hours for evaluation of Rabies post exposure treatment.  Contact your Medical Evacuation Insurance.    Repiratory illness prevention strategies ( Covid and Influenza ) CDC recommendations:  Consider wearing a mask in crowded or poorly ventilated indoor areas, including on public transportation and in transportation hubs.  Hand washing: frequent, thorough handwashing with soap and water for 20 seconds. Use an alcohol-based hand  with at least 60% alcohol if soap and water are not readily available. Avoid touching face, nose, eyes, mouth unless you have done appropriate hand washing as above.  VACCINES:  Staying up to date on COVID-19 vaccines significantly lowers the risk of getting very sick, being hospitalized, or dying from COVID-19. CDC recommends that everyone stay up to date on their COVID-19 vaccines, especially people with weakened immune systems.    Travel Covid 19 Testing:  updated 12/06/2021  International travelers: Pre-travel:  See country specific Embassy websites or airline websites.    Post travel: CDC recommends getting tested 3-5 days after your trip     Post-travel illness:  Contact your provider or [facility] if you develop a fever, rash, cough, diarrhea or other symptoms for up to 1 year after travel.  Inform your healthcare provider when and where you traveled to.    Please call the [facility] with any questions [phone]  Or send your provider a 'My Chart' note.

## 2023-06-22 NOTE — RESULT ENCOUNTER NOTE
GIL Murry,    Your blood type is B Positive.  You are immune to Rubella, Mumps and Measles . You do not need the MMR vaccine.   Safe travels  Mel Gómez (Lori) CNP  [facility]  Certificate in Travel Health

## 2023-06-26 ENCOUNTER — VIRTUAL VISIT (OUTPATIENT)
Dept: SLEEP MEDICINE | Facility: CLINIC | Age: 48
End: 2023-06-26
Attending: FAMILY MEDICINE
Payer: COMMERCIAL

## 2023-06-26 VITALS — WEIGHT: 234 LBS | BODY MASS INDEX: 33.5 KG/M2 | HEIGHT: 70 IN

## 2023-06-26 DIAGNOSIS — R06.83 SNORING: Primary | ICD-10-CM

## 2023-06-26 DIAGNOSIS — E66.811 OBESITY (BMI 30.0-34.9): ICD-10-CM

## 2023-06-26 DIAGNOSIS — G47.19 EXCESSIVE DAYTIME SLEEPINESS: ICD-10-CM

## 2023-06-26 DIAGNOSIS — I10 BENIGN ESSENTIAL HYPERTENSION: ICD-10-CM

## 2023-06-26 PROCEDURE — 99204 OFFICE O/P NEW MOD 45 MIN: CPT | Mod: VID | Performed by: PHYSICIAN ASSISTANT

## 2023-06-26 ASSESSMENT — SLEEP AND FATIGUE QUESTIONNAIRES
HOW LIKELY ARE YOU TO NOD OFF OR FALL ASLEEP WHILE SITTING AND READING: MODERATE CHANCE OF DOZING
HOW LIKELY ARE YOU TO NOD OFF OR FALL ASLEEP WHILE WATCHING TV: HIGH CHANCE OF DOZING
HOW LIKELY ARE YOU TO NOD OFF OR FALL ASLEEP WHILE SITTING INACTIVE IN A PUBLIC PLACE: MODERATE CHANCE OF DOZING
HOW LIKELY ARE YOU TO NOD OFF OR FALL ASLEEP WHEN YOU ARE A PASSENGER IN A CAR FOR AN HOUR WITHOUT A BREAK: HIGH CHANCE OF DOZING
HOW LIKELY ARE YOU TO NOD OFF OR FALL ASLEEP IN A CAR, WHILE STOPPED FOR A FEW MINUTES IN TRAFFIC: WOULD NEVER DOZE
HOW LIKELY ARE YOU TO NOD OFF OR FALL ASLEEP WHILE SITTING QUIETLY AFTER LUNCH WITHOUT ALCOHOL: MODERATE CHANCE OF DOZING
HOW LIKELY ARE YOU TO NOD OFF OR FALL ASLEEP WHILE LYING DOWN TO REST IN THE AFTERNOON WHEN CIRCUMSTANCES PERMIT: HIGH CHANCE OF DOZING
HOW LIKELY ARE YOU TO NOD OFF OR FALL ASLEEP WHILE SITTING AND TALKING TO SOMEONE: WOULD NEVER DOZE

## 2023-06-26 ASSESSMENT — PAIN SCALES - GENERAL: PAINLEVEL: NO PAIN (0)

## 2023-06-26 NOTE — PATIENT INSTRUCTIONS
"          MY TREATMENT INFORMATION FOR SLEEP APNEA-  Jeanmarie Mckinley    Frequently asked questions:  1. What is Obstructive Sleep Apnea (INES)? INES is the most common type of sleep apnea. Apnea means, \"without breath.\"  Apnea is most often caused by narrowing or collapse of the upper airway as muscles relax during sleep.   Almost everyone has occasional apneas. Most people with sleep apnea have had brief interruptions at night frequently for many years.  The severity of sleep apnea is related to how frequent and severe the events are.   2. What are the consequences of INES? Symptoms include: feeling sleepy during the day, snoring loudly, gasping or stopping of breathing, trouble sleeping, and occasionally morning headaches or heartburn at night.  Sleepiness can be serious and even increase the risk of falling asleep while driving. Other health consequences may include development of high blood pressure and other cardiovascular disease in persons who are susceptible. Untreated INES  can contribute to heart disease, stroke and diabetes.   3. What are the treatment options? In most situations, sleep apnea is a lifelong disease that must be managed with daily therapy. Medications are not effective for sleep apnea and surgery is generally not considered until other therapies have been tried. Your treatment is your choice . Continuous Positive Airway (CPAP) works right away and is the therapy that is effective in nearly everyone. An oral device to hold your jaw forward is usually the next most reliable option. Other options include postioning devices (to keep you off your back), weight loss, and surgery including a tongue pacing device. There is more detail about some of these options below.  4. Are my sleep studies covered by insurance? Although we will request verification of coverage, we advise you also check in advance of the study to ensure there is coverage.    Important tips for those choosing CPAP and similar " devices  For new devices, sign up for device FRANKLIN to monitor your device for your followup visits  We encourage you to utilize the [employer] franklin or website (Datam web (resmed.com) ) to monitor your therapy progress and share the data with your healthcare team when you discuss your sleep apnea.                                                    Know your equipment:  CPAP is continuous positive airway pressure that prevents obstructive sleep apnea by keeping the throat from collapsing while you are sleeping. In most cases, the device is  smart  and can slowly self-adjusts if your throat collapses and keeps a record every day of how well you are treated-this information is available to you and your care team.  BPAP is bilevel positive airway pressure that keeps your throat open and also assists each breath with a pressure boost to maintain adequate breathing.  Special kinds of BPAP are used in patients who have inadequate breathing from lung or heart disease. In most cases, the device is  smart  and can slowly self-adjusts to assist breathing. Like CPAP, the device keeps a record of how well you are treated.  Your mask is your connection to the device. You get to choose what feels most comfortable and the staff will help to make sure if fits. Here: are some examples of the different masks that are available:       Key points to remember on your journey with sleep apnea:  Sleep study.  PAP devices often need to be adjusted during a sleep study to show that they are effective and adjusted right.  Good tips to remember: Try wearing just the mask during a quiet time during the day so your body adapts to wearing it. A humidifier is recommended for comfort in most cases to prevent drying of your nose and throat. Allergy medication from your provider may help you if you are having nasal congestion.  Getting settled-in. It takes more than one night for most of us to get used to wearing a mask. Try wearing just the mask  during a quiet time during the day so your body adapts to wearing it. A humidifier is recommended for comfort in most cases. Our team will work with you carefully on the first day and will be in contact within 4 days and again at 2 and 4 weeks for advice and remote device adjustments. Your therapy is evaluated by the device each day.   Use it every night. The more you are able to sleep naturally for 7-8 hours, the more likely you will have good sleep and to prevent health risks or symptoms from sleep apnea. Even if you use it 4 hours it helps. Occasionally all of us are unable to use a medical therapy, in sleep apnea, it is not dangerous to miss one night.   Communicate. Call our skilled team on the number provided on the first day if your visit for problems that make it difficult to wear the device. Over 2 out of 3 patients can learn to wear the device long-term with help from our team. Remember to call our team or your sleep providers if you are unable to wear the device as we may have other solutions for those who cannot adapt to mask CPAP therapy. It is recommended that you sleep your sleep provider within the first 3 months and yearly after that if you are not having problems.   Use it for your health. We encourage use of CPAP masks during daytime quiet periods to allow your face and brain to adapt to the sensation of CPAP so that it will be a more natural sensation to awaken to at night or during naps. This can be very useful during the first few weeks or months of adapting to CPAP though it does not help medically to wear CPAP during wakefulness and  should not be used as a strategy just to meet guidelines.  Take care of your equipment. Make sure you clean your mask and tubing using directions every day and that your filter and mask are replaced as recommended or if they are not working.     BESIDES CPAP, WHAT OTHER THERAPIES ARE THERE?    Positioning Device  Positioning devices are generally used when sleep  apnea is mild and only occurs on your back.This example shows a pillow that straps around the waist. It may be appropriate for those whose sleep study shows milder sleep apnea that occurs primarily when lying flat on one's back. Preliminary studies have shown benefit but effectiveness at home may need to be verified by a home sleep test. These devices are generally not covered by medical insurance.  Examples of devices that maintain sleeping on the back to prevent snoring and mild sleep apnea.    Belt type body positioner  http://[employer]/    Electronic reminder  http://nightshifttherapy.com/            Oral Appliance  What is oral appliance therapy?  An oral appliance device fits on your teeth at night like a retainer used after having braces. The device is made by a specialized dentist and requires several visits over 1-2 months before a manufactured device is made to fit your teeth and is adjusted to prevent your sleep apnea. Once an oral device is working properly, snoring should be improved. A home sleep test may be recommended at that time if to determine whether the sleep apnea is adequately treated.       Some things to remember:  -Oral devices are often, but not always, covered by your medical insurance. Be sure to check with your insurance provider.   -If you are referred for oral therapy, you will be given a list of specialized dentists to consider or you may choose to visit the Web site of the American Academy of Dental Sleep Medicine  -Oral devices are less likely to work if you have severe sleep apnea or are extremely overweight.     More detailed information  An oral appliance is a small acrylic device that fits over the upper and lower teeth  (similar to a retainer or a mouth guard). This device slightly moves jaw forward, which moves the base of the tongue forward, opens the airway, improves breathing for effective treat snoring and obstructive sleep apnea in perhaps 7 out of 10 people .  The  best working devices are custom-made by a dental device  after a mold is made of the teeth 1, 2, 3.  When is an oral appliance indicated?  Oral appliance therapy is recommended as a first-line treatment for patients with primary snoring, mild sleep apnea, and for patients with moderate sleep apnea who prefer appliance therapy to use of CPAP4, 5. Severity of sleep apnea is determined by sleep testing and is based on the number of respiratory events per hour of sleep.   How successful is oral appliance therapy?  The success rate of oral appliance therapy in patients with mild sleep apnea is 75-80% while in patients with moderate sleep apnea it is 50-70%. The chance of success in patients with severe sleep apnea is 40-50%. The research also shows that oral appliances have a beneficial effect on the cardiovascular health of INES patients at the same magnitude as CPAP therapy7.  Oral appliances should be a second-line treatment in cases of severe sleep apnea, but if not completely successful then a combination therapy utilizing CPAP plus oral appliance therapy may be effective. Oral appliances tend to be effective in a broad range of patients although studies show that the patients who have the highest success are females, younger patients, those with milder disease, and less severe obesity. 3, 6.   Finding a dentist that practices dental sleep medicine  Specific training is available through the American Academy of Dental Sleep Medicine for dentists interested in working in the field of sleep. To find a dentist who is educated in the field of sleep and the use of oral appliances, near you, visit the Web site of the American Academy of Dental Sleep Medicine.    References  1. Brina et al. Objectively measured vs self-reported compliance during oral appliance therapy for sleep-disordered breathing. Chest 2013; 144(5): 0042-8526.  2. Judith et al. Objective measurement of compliance during oral  appliance therapy for sleep-disordered breathing. Thorax 2013; 68(1): 91-96.  3. Karthik, et al. Mandibular advancement devices in 620 men and women with INES and snoring: tolerability and predictors of treatment success. Chest 2004; 125: 9900-2083.  4. Regino et al. Oral appliances for snoring and INES: a review. Sleep 2006; 29: 244-262.  5. Nette et al. Oral appliance treatment for INES: an update. J Clin Sleep Med 2014; 10(2): 215-227.  6. Anatoliy et al. Predictors of OSAH treatment outcome. J Dent Res 2007; 86: 1565-3900.      Weight Loss:    Weight loss is a long-term strategy that may improve sleep apnea in some patients.    Weight management is a personal decision and the decision should be based on your interest and the potential benefits.  If you are interested in exploring weight loss strategies, the following discussion covers the impact on weight loss on sleep apnea and the approaches that may be successful.    Being overweight does not necessarily mean you will have health consequences.  Those who have BMI over 35 or over 27 with existing medical conditions carries greater risk.   Weight loss decreases severity of sleep apnea in most people with obesity. For those with mild obesity who have developed snoring with weight gain, even 15-30 pound weight loss can improve and occasionally eliminate sleep apnea.  Structured and life-long dietary and health habits are necessary to lose weight and keep healthier weight levels.     Though there may be significant health benefits from weight loss, long-term weight loss is very difficult to achieve- studies show success with dietary management in less than 10% of people. In addition, substantial weight loss may require years of dietary control and may be difficult if patients have severe obesity. In these cases, surgical management may be considered.  Finally, older individuals who have tolerated obesity without health complications may be less likely to  benefit from weight loss strategies.      [unfilled]    Surgery:    Surgery for obstructive sleep apnea is considered generally only when other therapies fail to work. Surgery may be discussed with you if you are having a difficult time tolerating CPAP and or when there is an abnormal structure that requires surgical correction.  Nose and throat surgeries often enlarge the airway to prevent collapse.  Most of these surgeries create pain for 1-2 weeks and up to half of the most common surgeries are not effective throughout life.  You should carefully discuss the benefits and drawbacks to surgery with your sleep provider and surgeon to determine if it is the best solution for you.   More information  Surgery for INES is directed at areas that are responsible for narrowing or complete obstruction of the airway during sleep.  There are a wide range of procedures available to enlarge and/or stabilize the airway to prevent blockage of breathing in the three major areas where it can occur: the palate, tongue, and nasal regions.  Successful surgical treatment depends on the accurate identification of the factors responsible for obstructive sleep apnea in each person.  A personalized approach is required because there is no single treatment that works well for everyone.  Because of anatomic variation, consultation with an examination by a sleep surgeon is a critical first step in determining what surgical options are best for each patient.  In some cases, examination during sedation may be recommended in order to guide the selection of procedures.  Patients will be counseled about risks and benefits as well as the typical recovery course after surgery. Surgery is typically not a cure for a person s INES.  However, surgery will often significantly improve one s INES severity (termed  success rate ).  Even in the absence of a cure, surgery will decrease the cardiovascular risk associated with OSA7; improve overall quality of  life8 (sleepiness, functionality, sleep quality, etc).      Palate Procedures:  Patients with INES often have narrowing of their airway in the region of their tonsils and uvula.  The goals of palate procedures are to widen the airway in this region as well as to help the tissues resist collapse.  Modern palate procedure techniques focus on tissue conservation and soft tissue rearrangement, rather than tissue removal.  Often the uvula is preserved in this procedure. Residual sleep apnea is common in patient after pharyngoplasty with an average reduction in sleep apnea events of 33%2.      Tongue Procedures:  ExamWhile patients are awake, the muscles that surround the throat are active and keep this region open for breathing. These muscles relax during sleep, allowing the tongue and other structures to collapse and block breathing.  There are several different tongue procedures available.  Selection of a tongue base procedure depends on characteristics seen on physical exam.  Generally, procedures are aimed at removing bulky tissues in this area or preventing the back of the tongue from falling back during sleep.  Success rates for tongue surgery range from 50-62%3.    Hypoglossal Nerve Stimulation:  Hypoglossal nerve stimulation has recently received approval from the [place] Food and Drug Administration for the treatment of obstructive sleep apnea.  This is based on research showing that the system was safe and effective in treating sleep apnea6.  Results showed that the median AHI score decreased 68%, from 29.3 to 9.0. This therapy uses an implant system that senses breathing patterns and delivers mild stimulation to airway muscles, which keeps the airway open during sleep.  The system consists of three fully implanted components: a small generator (similar in size to a pacemaker), a breathing sensor, and a stimulation lead.  Using a small handheld remote, a patient turns the therapy on before bed and off upon  awakening.    Candidates for this device must be greater than 18 years of age, have moderate to severe INES (AHI between 15-65), BMI less than 35, have tried CPAP/oral appliance for at least 8 weeks without success, and have appropriate upper airway anatomy (determined by a sleep endoscopy performed by Dr. Ivan Kwan).    Hypoglossal Nerve Stimulation Pathway:    The sleep surgeon s office will work with the patient through the insurance prior-authorization process (including communications and appeals).    Nasal Procedures:  Nasal obstruction can interfere with nasal breathing during the day and night.  Studies have shown that relief of nasal obstruction can improve the ability of some patients to tolerate positive airway pressure therapy for obstructive sleep apnea1.  Treatment options include medications such as nasal saline, topical corticosteroid and antihistamine sprays, and oral medications such as antihistamines or decongestants. Non-surgical treatments can include external nasal dilators for selected patients. If these are not successful by themselves, surgery can improve the nasal airway either alone or in combination with these other options.      Combination Procedures:  Combination of surgical procedures and other treatments may be recommended, particularly if patients have more than one area of narrowing or persistent positional disease.  The success rate of combination surgery ranges from 66-80%2,3.    References  Laura STARKEY. The Role of the Nose in Snoring and Obstructive Sleep Apnoea: An Update.  Eur Arch Otorhinolaryngol. 2011; 268: 1365-73.   Alfonzo SM; Sofía JA; Lashonda JR; Pallanch JF; Chad MB; Annie SG; John WALTON. Surgical modifications of the upper airway for obstructive sleep apnea in adults: a systematic review and meta-analysis. SLEEP 2010;33(10):6200-0037. Marquise MALDONADO. Hypopharyngeal surgery in obstructive sleep apnea: an evidence-based medicine review.  Arch Otolaryngol Head Neck Surg.  2006 Feb;132(2):206-13.  Lj YH1, Marysol Y, Vince MADHU. The efficacy of anatomically based multilevel surgery for obstructive sleep apnea. Otolaryngol Head Neck Surg. 2003 Oct;129(4):327-35.  Marquise MALDONADO, Goldberg A. Hypopharyngeal Surgery in Obstructive Sleep Apnea: An Evidence-Based Medicine Review. Arch Otolaryngol Head Neck Surg. 2006 Feb;132(2):206-13.  Elizabeth RODRIGUEZ et al. Upper-Airway Stimulation for Obstructive Sleep Apnea.  N Engl J Med. 2014 Jan 9;370(2):139-49.  Pealvin Y et al. Increased Incidence of Cardiovascular Disease in Middle-aged Men with Obstructive Sleep Apnea. Am J Respir Crit Care Med; 2002 166: 159-165  Olsonóscar FRANCOIS et al. Studying Life Effects and Effectiveness of Palatopharyngoplasty (SLEEP) study: Subjective Outcomes of Isolated Uvulopalatopharyngoplasty. Otolaryngol Head Neck Surg. 2011; 144: 623-631.        WHAT IF I ONLY HAVE SNORING?    Mandibular advancement devices, lateral sleep positioning, long-term weight loss and treatment of nasal allergies have been shown to improve snoring.  Exercising tongue muscles with a game (https://apps.[employer]/us/franklin/soundly-reduce-snoring/pc7987731066) or stimulating the tongue during the day with a device (https://doi.org/10.3390/qit64413792) have improved snoring in some individuals.    Remember to Drive Safe... Drive Alive     Sleep health profoundly affects your health, mood, and your safety.  Thirty three percent of the population (one in three of us) is not getting enough sleep and many have a sleep disorder. Not getting enough sleep or having an untreated / undertreated sleep condition may make us sleepy without even knowing it. In fact, our driving could be dramatically impaired due to our sleep health. As your provider, here are some things I would like you to know about driving:     Here are some warning signs for impairment and dangerous drowsy driving:              -Having been awake more than 16 hours               -Looking tired                -Eyelid drooping              -Head nodding (it could be too late at this point)              -Driving for more than 30 minutes     Some things you could do to make the driving safer if you are experiencing some drowsiness:              -Stop driving and rest              -Call for transportation              -Make sure your sleep disorder is adequately treated     Some things that have been shown NOT to work when experiencing drowsiness while driving:              -Turning on the radio              -Opening windows              -Eating any  distracting  /  entertaining  foods (e.g., sunflower seeds, candy, or any other)              -Talking on the phone      Your decision may not only impact your life, but also the life of others. Please, remember to drive safe for yourself and all of us.

## 2023-06-26 NOTE — NURSING NOTE
Is the patient currently in the state of [place]? YES    Visit mode:VIDEO    If the visit is dropped, the patient can be reconnected by: VIDEO VISIT: Text to cell phone: [phone]    Will anyone else be joining the visit? NO      How would you like to obtain your AVS? MyChart    Are changes needed to the allergy or medication list? NO    Reason for visit: Consult      Juan Carlos Faria

## 2023-06-26 NOTE — PROGRESS NOTES
Virtual Visit Details    Type of service:  Video Visit   Video visit start time 1:05 PM  Video visit end time 1:40 PM  Originating Location (pt. Location): Home    Distant Location (provider location):  Off-site  Platform used for Video Visit: [facility]         Outpatient Sleep Medicine Consultation:      Name: Jeanmarie Mckinley MRN# [id number]   Age: 47 year old YOB: 1975     Date of Consultation: June 26, 2023  Consultation is requested by: Krista Juares MD  [address] [place] [address] Krista Juares  Primary care provider: Krista Juares       Reason for Sleep Consult:     Jeanmarie Mckinley is sent by Krista Juares for a sleep consultation regarding daytime drowsiness.    Chief Complaint   Patient presents with     Consult          Assessment and Plan:     Summary Sleep Diagnoses:  1. Snoring  2. Excessive daytime sleepiness  Comorbid Diagnoses:  3. Benign essential hypertension  4. Obesity (BMI 30.0-34.9)    Patient is being evaluated for Obstructive Sleep Apnea (INES).  Patient's risk factors for INES include: snoring, excessive daytime sleepiness (ESS 15), enlarged neck girth (patient reports 17.5 inches), obesity (BMI 33.6), high blood pressure, and male gender. We discussed pathophysiology of INES and testing with overnight attended polysomnography versus home sleep apnea testing. In lab PSG preferred by patient's insurance and orders placed today.  We briefly discussed the option of CPAP versus oral appliance therapy as primary treatment options for sleep apnea.  He appeared open to either today but we will plan to discuss in detail at his follow-up visit.  We will plan to see him back about 2 weeks after study is completed for results discussion.  Education materials provided in AVS.         History of Present Illness:     Jeanmarie Mckinley is a 47-year-old male with type 2 diabetes mellitus, hypertension, asthma, who presents to clinic today for evaluation of  "snoring and daytime sleepiness, concern for sleep apnea.     Patient states the main reason for his visit is because \"I fall asleep really easily I fall asleep in the evening just hanging out and my partner complains of my snoring\".  Has snored since he was been in his 20s, worse since he is in his 40s.  No gasping/choking arousals.  No witnessed apneic events by bed partner.  No nocturnal GERD.  No morning headaches.  Typically a side sleeper.    Current sleep schedule on weekdays includes bedtime 11:00PM and wakes 6-7:00AM.  Patient works as an , he practices and also teaches. On weekends he goes to bed between 11-12:00 AM and wakes between 7-8:00 AM, sometimes not getting out of bed until 9:00 AM though.  Denies any difficulty falling asleep at night.  He does wake up normally twice for the bathroom, generally returns to sleep quickly in about 5 minutes but can take up to an hour.    Denies any planned daytime napping but he does admit to inadvertent dozing in front of the TV in the evenings around 9 PM and will end up sleeping 30-60 minutes. He had a total Turner Sleepiness Scale of 15 (06/26/23 1235), with scores of 10 or higher indicating abnormal levels of sleepiness.    Denies restless legs syndrome symptoms. No kicking/punching movements. No sleep walking. No bruxism. No nightmares. No sleep hallucinations. No sleep paralysis.     Drinks 3-4 cups of coffee in the morning then switches to water.  Drinks alcohol about 3 nights a week sometimes right up until bedtime.    No known family history of any sleep disorders.    SCALES:    EPWORTH SLEEPINESS SCALE         6/26/2023    12:35 PM    Turner Sleepiness Scale ( KULWANT Dave  [id number]<br>ESS - [place]/English - Final version - 21 Nov 07 - [facility] [place].)   Sitting and reading Moderate chance of dozing   Watching TV High chance of dozing   Sitting, inactive in a public place (e.g. a theatre or a meeting) Moderate chance of dozing   As a " passenger in a car for an hour without a break High chance of dozing   Lying down to rest in the afternoon when circumstances permit High chance of dozing   Sitting and talking to someone Would never doze   Sitting quietly after a lunch without alcohol Moderate chance of dozing   In a car, while stopped for a few minutes in traffic Would never doze   Adrian Score (MC) 15   Adrian Score (Sleep) 15         INSOMNIA SEVERITY INDEX (RACHNA)          6/26/2023    12:34 PM   Insomnia Severity Index (RACHNA)   Difficulty falling asleep 2   Difficulty staying asleep 2   Problems waking up too early 2   How SATISFIED/DISSATISFIED are you with your CURRENT sleep pattern? 2   How NOTICEABLE to others do you think your sleep problem is in terms of impairing the quality of your life? 4   How WORRIED/DISTRESSED are you about your current sleep problem? 2   To what extent do you consider your sleep problem to INTERFERE with your daily functioning (e.g. daytime fatigue, mood, ability to function at work/daily chores, concentration, memory, mood, etc.) CURRENTLY? 1   RACHNA Total Score 15       Guidelines for Scoring/Interpretation:  Total score categories:  0-7 = No clinically significant insomnia   8-14 = Subthreshold insomnia   15-21 = Clinical insomnia (moderate severity)  22-28 = Clinical insomnia (severe)  Used via courtesy of www.[facility].va.gov with permission from Zachary Nvaarro PhD., [facility]    Allergies:    Allergies   Allergen Reactions     Dust Mites      Seasonal Allergies        Medications:    Current Outpatient Medications   Medication Sig Dispense Refill     albuterol (VENTOLIN HFA) 108 (90 Base) MCG/ACT inhaler Inhale 1-2 puffs into the lungs every 6 hours as needed for shortness of breath or wheezing 3 g 3     ASPIRIN NOT PRESCRIBED (INTENTIONAL) Antiplatelet medication not prescribed intentionally due to Not indicated based on age  0     atorvastatin (LIPITOR) 40 MG tablet Take 1 tablet (40 mg) by mouth  daily 90 tablet 0     atovaquone-proguanil (MALARONE) 250-100 MG tablet Take 1 tablet by mouth daily Start 2 days before exposure to Malaria and continue daily till  7 days after exposure. 25 tablet 0     blood glucose (ACCU-CHEK FASTCLIX) lancing device Lancing device to be used with lancets. 1 each 0     blood glucose (NO BRAND SPECIFIED) test strip Use to test blood sugar 2 times daily or as directed. 200 strip 0     co-enzyme Q-10 100 MG CAPS capsule Take 100 mg by mouth daily       fluticasone-vilanterol (BREO ELLIPTA) 200-25 MCG/ACT inhaler Inhale 1 puff into the lungs daily 3 each 3     glipiZIDE (GLUCOTROL XL) 5 MG 24 hr tablet Take 1 tablet (5 mg) by mouth daily 90 tablet 3     insulin pen needle (BD VICENTE U/F) 32G X 4 MM miscellaneous USE ONCE DAILY AS DIRECTED. 100 each 3     lisinopril (ZESTRIL) 20 MG tablet Take 1 tablet (20 mg) by mouth daily 90 tablet 3     metFORMIN (GLUCOPHAGE XR) 500 MG 24 hr tablet TAKE 4 TABLETS (2,000MG)   DAILY WITH FOOD 360 tablet 3     Multiple Vitamin (MULTIVITAMIN ADULT PO) Take 1 tablet by mouth daily       Omega-3 Fatty Acids (FISH OIL) 1200 MG capsule Take 1,200 mg by mouth daily       semaglutide (OZEMPIC, 0.25 OR 0.5 MG/DOSE,) 2 MG/3ML SOPN pen Inject 0.25 mg Subcutaneous once a week For 4 weeks, then increase to 0.5mg once weekly 3 mL 1     triamcinolone (KENALOG) 0.1 % external cream Apply topically 2 times daily as needed for irritation (eczema) 80 g 0     TURMERIC CURCUMIN PO Take 1 tablet by mouth daily       vitamin D3 (CHOLECALCIFEROL) 50 mcg (2000 units) tablet Take 1 tablet by mouth daily         Problem List:  Patient Active Problem List    Diagnosis Date Noted     Has daytime drowsiness 03/06/2023     Priority: Medium     Controlled type 2 diabetes mellitus without complication, without long-term current use of insulin (H) 08/17/2021     Priority: Medium     Acute bilateral low back pain without sciatica 12/20/2016     Priority: Medium     Somatic dysfunction  of sacral region 12/20/2016     Priority: Medium     Sacral pain 12/20/2016     Priority: Medium     Thoracic segment dysfunction 12/20/2016     Priority: Medium     Poor posture 12/20/2016     Priority: Medium     Spasm of back muscles 12/20/2016     Priority: Medium     Mixed hyperlipidemia 04/07/2016     Priority: Medium     simvastatin 20 mg on and off  Fasting lipid-futured       BMI 34.0-34.9,adult 04/07/2016     Priority: Medium     Fish allergy 04/07/2016     Priority: Medium     fresh water fish-only  see allergist       Asthma, persistent controlled 04/05/2016     Priority: Medium     advair BID       Benign essential hypertension 04/05/2016     Priority: Medium     control -lisinorpril 10 mg once a day          Past Medical/Surgical History:  Past Medical History:   Diagnosis Date     Benign essential hypertension 04/05/2016    control -lisinorpril 10 mg once a day      Diabetes mellitus type 2, controlled (H) 04/05/2016    Metformin 2000/day SEE MTM SEE EYE      High cholesterol      Uncomplicated asthma      Past Surgical History:   Procedure Laterality Date     COLONOSCOPY N/A 6/6/2022    Procedure: COLONOSCOPY;  Surgeon: Cinthya Lo MD;  Location:  GI     wisdom teeth         Social History:  Social History     Socioeconomic History     Marital status:      Spouse name: Not on file     Number of children: Not on file     Years of education: Not on file     Highest education level: Not on file   Occupational History     Not on file   Tobacco Use     Smoking status: Never     Smokeless tobacco: Never   Substance and Sexual Activity     Alcohol use: Yes     Alcohol/week: 0.0 standard drinks of alcohol     Comment: 1 drink per day     Drug use: No     Sexual activity: Yes     Partners: Female   Other Topics Concern     Parent/sibling w/ CABG, MI or angioplasty before 65F 55M? No   Social History Narrative    Working from home &  from  & [employer]     Social Determinants of Health  "    Financial Resource Strain: Not on file   Food Insecurity: Not on file   Transportation Needs: Not on file   Physical Activity: Not on file   Stress: Not on file   Social Connections: Not on file   Intimate Partner Violence: Not on file   Housing Stability: Not on file       Family History:  Family History   Problem Relation Age of Onset     Diabetes Mother      Hypertension Mother      Glaucoma No family hx of      Macular Degeneration No family hx of        Physical Examination:  Vitals: Ht 1.778 m (5' 10\")   Wt 106.1 kg (234 lb)   BMI 33.58 kg/m    BMI= Body mass index is 33.58 kg/m .  General appearance: Awake, alert, cooperative. Well groomed. Sitting comfortably in chair. In no apparent distress.  HEENT: Head: Normocephalic, atraumatic. Eyes:Conjunctiva clear. Sclera normal. Nose: External appearance without deformity.   Pulmonary:  Able to speak easily in full sentences. No cough or wheeze.   Skin:  No rashes or significant lesions on visible skin.   Neurologic: Alert, oriented x3.   Psychiatric: Mood euthymic. Affect congruent with full range and intensity.         Data: All pertinent previous laboratory data reviewed     Recent Labs   Lab Test 02/27/23  1600 08/10/22  0909    139   POTASSIUM 4.3 4.4   CHLORIDE 104 108   CO2 23 21   ANIONGAP 13 10   * 175*   BUN 16.7 16   CR 1.23* 0.96   AROLDO 10.3* 9.3       No results for input(s): WBC, RBC, HGB, HCT, MCV, MCH, MCHC, RDW, PLT in the last 55606 hours.    Recent Labs   Lab Test 08/10/22  0909   PROTTOTAL 7.4   ALBUMIN 4.2   BILITOTAL 2.3*   ALKPHOS 69   AST 20   ALT 30       TSH (mU/L)   Date Value   07/16/2019 0.35 (L)   06/25/2018 0.56       No results found for: UAMP, UBARB, BENZODIAZEUR, UCANN, UCOC, OPIT, UPCP    No results found for: IRONSAT, [id number], ORLANDO    No results found for: PH, PHARTERIAL, PO2, [id number], SAT, PCO2, HCO3, BASEEXCESS, DEON, BEB    @LABRCNTIPR(phv:4,pco2v:4,po2v:4,hco3v:4,saúl:4,o2per:4)@    Echocardiology: No " results found for this or any previous visit (from the past 4320 hour(s)).    Chest x-ray: No results found for this or any previous visit from the past 365 days.      Chest CT: No results found for this or any previous visit from the past 365 days.      PFT: Most Recent [facility]ze Pulmonary Function Testing    No results found for: 20001  No results found for: 20002  No results found for: 20003  No results found for: 20015  No results found for: 20016  No results found for: 20027  No results found for: 20028  No results found for: 20029  No results found for: 20079  No results found for: 20080  No results found for: 20081  No results found for: 20335  No results found for: 20105  No results found for: 20053  No results found for: 20054  No results found for: 20055      Sharon Bear PA-C 6/26/2023     [facility]  [address] [address]     [facility]  [address], [place] [address]    Chart documentation was completed, in part, with [employer] voice-recognition software. Even though reviewed, some grammatical, spelling, and word errors may remain.    52 minutes spent on day of encounter reviewing medical records, history and physical examination, review of previous testing and interpretation, documentation and further activities as noted above

## 2023-07-18 ENCOUNTER — ALLIED HEALTH/NURSE VISIT (OUTPATIENT)
Dept: FAMILY MEDICINE | Facility: CLINIC | Age: 48
End: 2023-07-18
Payer: COMMERCIAL

## 2023-07-18 DIAGNOSIS — Z71.84 TRAVEL ADVICE ENCOUNTER: ICD-10-CM

## 2023-07-18 PROCEDURE — 90636 HEP A/HEP B VACC ADULT IM: CPT

## 2023-07-18 PROCEDURE — 99207 PR NO CHARGE NURSE ONLY: CPT

## 2023-07-18 PROCEDURE — 90471 IMMUNIZATION ADMIN: CPT

## 2023-07-18 ASSESSMENT — ANXIETY QUESTIONNAIRES
GAD7 TOTAL SCORE: 0
6. BECOMING EASILY ANNOYED OR IRRITABLE: NOT AT ALL
5. BEING SO RESTLESS THAT IT IS HARD TO SIT STILL: NOT AT ALL
IF YOU CHECKED OFF ANY PROBLEMS ON THIS QUESTIONNAIRE, HOW DIFFICULT HAVE THESE PROBLEMS MADE IT FOR YOU TO DO YOUR WORK, TAKE CARE OF THINGS AT HOME, OR GET ALONG WITH OTHER PEOPLE: NOT DIFFICULT AT ALL
7. FEELING AFRAID AS IF SOMETHING AWFUL MIGHT HAPPEN: NOT AT ALL
2. NOT BEING ABLE TO STOP OR CONTROL WORRYING: NOT AT ALL
4. TROUBLE RELAXING: NOT AT ALL
3. WORRYING TOO MUCH ABOUT DIFFERENT THINGS: NOT AT ALL
GAD7 TOTAL SCORE: 0
1. FEELING NERVOUS, ANXIOUS, OR ON EDGE: NOT AT ALL

## 2023-07-18 ASSESSMENT — PATIENT HEALTH QUESTIONNAIRE - PHQ9
SUM OF ALL RESPONSES TO PHQ QUESTIONS 1-9: 3
10. IF YOU CHECKED OFF ANY PROBLEMS, HOW DIFFICULT HAVE THESE PROBLEMS MADE IT FOR YOU TO DO YOUR WORK, TAKE CARE OF THINGS AT HOME, OR GET ALONG WITH OTHER PEOPLE: NOT DIFFICULT AT ALL
SUM OF ALL RESPONSES TO PHQ QUESTIONS 1-9: 3

## 2023-07-18 NOTE — PROGRESS NOTES
Prior to immunization administration, verified patients identity using patient s name and date of birth. Please see Immunization Activity for additional information.     Screening Questionnaire for Adult Immunization    Are you sick today?   No   Do you have allergies to medications, food, a vaccine component or latex?   No   Have you ever had a serious reaction after receiving a vaccination?   No   Do you have a long-term health problem with heart, lung, kidney, or metabolic disease (e.g., diabetes), asthma, a blood disorder, no spleen, complement component deficiency, a cochlear implant, or a spinal fluid leak?  Are you on long-term aspirin therapy?   Yes   Do you have cancer, leukemia, HIV/AIDS, or any other immune system problem?   No   Do you have a parent, brother, or sister with an immune system problem?   No   In the past 3 months, have you taken medications that affect  your immune system, such as prednisone, other steroids, or anticancer drugs; drugs for the treatment of rheumatoid arthritis, Crohn s disease, or psoriasis; or have you had radiation treatments?   No   Have you had a seizure, or a brain or other nervous system problem?   No   During the past year, have you received a transfusion of blood or blood    products, or been given immune (gamma) globulin or antiviral drug?   No   For women: Are you pregnant or is there a chance you could become       pregnant during the next month?   No   Have you received any vaccinations in the past 4 weeks?   Yes     Immunization questionnaire was positive for at least one answer.      I have reviewed the following standing orders:     This patient is due and qualifies for the Hepatitis A & B vaccine.    Click here for HEP A & B STANDING ORDER    I have reviewed the vaccines inclusion and exclusion criteria; No concerns regarding eligibility.         Patient instructed to remain in clinic for 15 minutes afterwards, and to report any adverse reactions.      Screening performed by Kenyatta Negron on 7/18/2023 at 8:13 AM.      Answers for HPI/ROS submitted by the patient on 7/18/2023  If you checked off any problems, how difficult have these problems made it for you to do your work, take care of things at home, or get along with other people?: Not difficult at all  PHQ9 TOTAL SCORE: 3  SHARIF 7 TOTAL SCORE: 0

## 2023-09-26 ENCOUNTER — CARE COORDINATION (OUTPATIENT)
Dept: SLEEP MEDICINE | Facility: CLINIC | Age: 48
End: 2023-09-26
Payer: COMMERCIAL

## 2023-09-26 ENCOUNTER — TELEPHONE (OUTPATIENT)
Dept: SLEEP MEDICINE | Facility: CLINIC | Age: 48
End: 2023-09-26

## 2023-09-26 DIAGNOSIS — R06.83 SNORING: Primary | ICD-10-CM

## 2023-09-26 DIAGNOSIS — E66.811 OBESITY (BMI 30.0-34.9): ICD-10-CM

## 2023-09-26 DIAGNOSIS — I10 BENIGN ESSENTIAL HYPERTENSION: ICD-10-CM

## 2023-09-26 DIAGNOSIS — G47.19 EXCESSIVE DAYTIME SLEEPINESS: ICD-10-CM

## 2023-09-26 NOTE — TELEPHONE ENCOUNTER
Received following email please review    Hello!    The below patient is not meeting the [employer] med policy for PSG DX    [email or web address]        Thank you!    Candice Morales  Financial   [facility] Prior Authorization  [email or web address]  Office: [phone]  Fax: [phone]

## 2023-09-26 NOTE — TELEPHONE ENCOUNTER
PSG denied by insurance. Orders placed for HST. Please call patient to cancel PSG and re-schedule for HST.

## 2023-09-27 ENCOUNTER — CARE COORDINATION (OUTPATIENT)
Dept: SLEEP MEDICINE | Facility: CLINIC | Age: 48
End: 2023-09-27
Payer: COMMERCIAL

## 2023-10-07 ENCOUNTER — HEALTH MAINTENANCE LETTER (OUTPATIENT)
Age: 48
End: 2023-10-07

## 2023-11-07 ENCOUNTER — OFFICE VISIT (OUTPATIENT)
Dept: PHARMACY | Facility: CLINIC | Age: 48
End: 2023-11-07
Payer: COMMERCIAL

## 2023-11-07 ENCOUNTER — LAB (OUTPATIENT)
Dept: LAB | Facility: CLINIC | Age: 48
End: 2023-11-07
Payer: COMMERCIAL

## 2023-11-07 ENCOUNTER — ALLIED HEALTH/NURSE VISIT (OUTPATIENT)
Dept: FAMILY MEDICINE | Facility: CLINIC | Age: 48
End: 2023-11-07
Payer: COMMERCIAL

## 2023-11-07 DIAGNOSIS — Z23 NEED FOR COVID-19 VACCINE: ICD-10-CM

## 2023-11-07 DIAGNOSIS — E11.9 CONTROLLED TYPE 2 DIABETES MELLITUS WITHOUT COMPLICATION, WITHOUT LONG-TERM CURRENT USE OF INSULIN (H): Primary | ICD-10-CM

## 2023-11-07 DIAGNOSIS — E11.65 TYPE 2 DIABETES MELLITUS WITH HYPERGLYCEMIA, WITHOUT LONG-TERM CURRENT USE OF INSULIN (H): ICD-10-CM

## 2023-11-07 DIAGNOSIS — J45.998 ASTHMA, PERSISTENT CONTROLLED: ICD-10-CM

## 2023-11-07 DIAGNOSIS — I10 BENIGN ESSENTIAL HYPERTENSION: ICD-10-CM

## 2023-11-07 DIAGNOSIS — Z23 NEED FOR INFLUENZA VACCINATION: ICD-10-CM

## 2023-11-07 DIAGNOSIS — E78.2 MIXED HYPERLIPIDEMIA: ICD-10-CM

## 2023-11-07 LAB
HBA1C MFR BLD: 5.9 % (ref 0–5.6)
HOLD SPECIMEN: NORMAL

## 2023-11-07 PROCEDURE — 90471 IMMUNIZATION ADMIN: CPT

## 2023-11-07 PROCEDURE — 36415 COLL VENOUS BLD VENIPUNCTURE: CPT

## 2023-11-07 PROCEDURE — 99606 MTMS BY PHARM EST 15 MIN: CPT | Performed by: PHARMACIST

## 2023-11-07 PROCEDURE — 83036 HEMOGLOBIN GLYCOSYLATED A1C: CPT

## 2023-11-07 PROCEDURE — 99207 PR NO CHARGE NURSE ONLY: CPT

## 2023-11-07 PROCEDURE — 90686 IIV4 VACC NO PRSV 0.5 ML IM: CPT

## 2023-11-07 PROCEDURE — 99607 MTMS BY PHARM ADDL 15 MIN: CPT | Performed by: PHARMACIST

## 2023-11-07 PROCEDURE — 91320 SARSCV2 VAC 30MCG TRS-SUC IM: CPT

## 2023-11-07 PROCEDURE — 90480 ADMN SARSCOV2 VAC 1/ONLY CMP: CPT

## 2023-11-07 ASSESSMENT — ASTHMA QUESTIONNAIRES
QUESTION_3 LAST FOUR WEEKS HOW OFTEN DID YOUR ASTHMA SYMPTOMS (WHEEZING, COUGHING, SHORTNESS OF BREATH, CHEST TIGHTNESS OR PAIN) WAKE YOU UP AT NIGHT OR EARLIER THAN USUAL IN THE MORNING: NOT AT ALL
ACT_TOTALSCORE: 25
ACT_TOTALSCORE: 25
QUESTION_4 LAST FOUR WEEKS HOW OFTEN HAVE YOU USED YOUR RESCUE INHALER OR NEBULIZER MEDICATION (SUCH AS ALBUTEROL): NOT AT ALL
QUESTION_1 LAST FOUR WEEKS HOW MUCH OF THE TIME DID YOUR ASTHMA KEEP YOU FROM GETTING AS MUCH DONE AT WORK, SCHOOL OR AT HOME: NONE OF THE TIME
QUESTION_2 LAST FOUR WEEKS HOW OFTEN HAVE YOU HAD SHORTNESS OF BREATH: NOT AT ALL
QUESTION_5 LAST FOUR WEEKS HOW WOULD YOU RATE YOUR ASTHMA CONTROL: COMPLETELY CONTROLLED

## 2023-11-07 NOTE — PROGRESS NOTES
Medication Therapy Management (MTM) Encounter    ASSESSMENT:                            Medication Adherence/Access: No issues identified    Diabetes   Stable - A1c returned below goal despite stopping glipizide. Can consider decreasing metformin in the future to see if this helps with bowel issues.   UACR slightly elevated at last check, hopefully improved with addition of Ozempic, recheck after first of the year (also on ACEi).   Appropriately on statin, appropriately not taking aspirin.   UTD on foot/eye exams, needs updated influenza and COVID vaccines.      Hypertension   Stable.     Hyperlipidemia    Trigs elevated when checked in Feb, LDL at goal <100mg    Asthma:   Stable    PLAN:                            No changes today.   Flu and Covid vaccines today.   Follow-up with Dr. Juares in February (we'll want to recheck your cholesterol, A1c and your urine protein at that vsiit).     Follow-up: 6 months with MTM, if you have questions, please reach out!     SUBJECTIVE/OBJECTIVE:                          Jeanmarie Mckinley is a 48 year old male coming in for a follow-up visit from 4/6/23.       Reason for visit: follow-up on labs, diabetes, immunizations. .    Allergies/ADRs: Reviewed in chart  Past Medical History: Reviewed in chart  Tobacco: He reports that he has never smoked. He has never used smokeless tobacco.  Alcohol: 1-3 beverages / week    Medication Adherence/Access: no issues reported    Diabetes   Ozempic 1mg weekly  Metformin ER 500mg 4 tablets daily  Has 2-3 BM per day, a little urgent, but not concerning.   Current diabetes symptoms: none     Eye exam is up to date  Foot exam is up to date  Urine Albumin:   Lab Results   Component Value Date    UMALCR 32.27 (H) 02/27/2023      Lab Results   Component Value Date    A1C 5.9 (H) 11/07/2023     Hypertension     Lisinopril 20mg daily  Patient reports no current medication side effects  Patient does not self-monitor blood pressure.       BP Readings from  Last 3 Encounters:   06/21/23 135/86   02/27/23 135/82   06/06/22 130/83     Pulse Readings from Last 3 Encounters:   06/21/23 73   02/27/23 72   06/06/22 66     Hyperlipidemia      atorvastatin 40mg daily  Patient reports no significant myalgias or other side effects     Recent Labs   Lab Test 02/27/23  1600 08/10/22  0909   CHOL 180 129   HDL 48 42   LDL 85 61   TRIG 235* 131     Asthma:   Breo - although not using daily  Albuterol - almost never uses  Patient reports no current medication side effects.      Patient reports the following symptoms: none. Able to exercise without SOB or issues. No nighttime or early morning awakenings due to cough/asthma symptoms.   ----------------  I spent 20 minutes with this patient today. All changes were made via collaborative practice agreement with Krista Juares MD. A copy of the visit note was provided to the patient's provider(s).    A summary of these recommendations was sent via [place].    Anders MartiD, VITO, BCACP  MTM Pharmacist, [facility]      Medication Therapy Recommendations  Controlled type 2 diabetes mellitus without complication, without long-term current use of insulin (H)    Rationale: Preventive therapy - Needs additional medication therapy - Indication   Recommendation: Start Medication - COVID-19 MRNA VACCINE (PFIZER) IM   Status: Accepted per CPA

## 2023-11-07 NOTE — LETTER
My Asthma Action Plan    Name: Jeanmarie Mckinley   YOB: 1975  Date: 11/7/2023   My doctor: Faith Montague [facility]   My clinic: [facility]        My Control Medicine: Fluticasone furoate + vilanterol (Breo Ellipta)  -  200/25mcg 1 puff once daily  My Rescue Medicine: Albuterol (Proair/Ventolin/Proventil HFA) 2-4 puffs EVERY 4 HOURS as needed. Use a spacer if recommended by your provider.   My Asthma Severity:   Mild Persistent  Know your asthma triggers:   allergies and sickness             GREEN ZONE   Good Control  I feel good  No cough or wheeze  Can work, sleep and play without asthma symptoms       Take your asthma control medicine every day.     If exercise triggers your asthma, take your rescue medication  15 minutes before exercise or sports, and  During exercise if you have asthma symptoms  Spacer to use with inhaler: If you have a spacer, make sure to use it with your inhaler             YELLOW ZONE Getting Worse  I have ANY of these:  I do not feel good  Cough or wheeze  Chest feels tight  Wake up at night   Keep taking your Green Zone medications  Start taking your rescue medicine:  every 20 minutes for up to 1 hour. Then every 4 hours for 24-48 hours.  If you stay in the Yellow Zone for more than 12-24 hours, contact your doctor.  If you do not return to the Green Zone in 12-24 hours or you get worse, start taking your oral steroid medicine if prescribed by your provider.           RED ZONE Medical Alert - Get Help  I have ANY of these:  I feel awful  Medicine is not helping  Breathing getting harder  Trouble walking or talking  Nose opens wide to breathe       Take your rescue medicine NOW  If your provider has prescribed an oral steroid medicine, start taking it NOW  Call your doctor NOW  If you are still in the Red Zone after 20 minutes and you have not reached your doctor:  Take your rescue medicine again and  Call 911 or go to the emergency room right  away    See your regular doctor within 2 weeks of an Emergency Room or Urgent Care visit for follow-up treatment.          Annual Reminders:  Meet with Asthma Educator,  Flu Shot in the Fall, consider Pneumonia Vaccination for patients with asthma (aged 19 and older).    Pharmacy:    [id number] - [place], [place] - [address]  [id number] - [place], [place] - [address]  [facility] [place] - [address] [facility] AT PORTAL TO [facility] SITES  [facility] - [place], [place]    Electronically signed by Faith Montague [facility]   Date: 11/07/23                      Asthma Triggers  How To Control Things That Make Your Asthma Worse    Triggers are things that make your asthma worse.  Look at the list below to help you find your triggers and what you can do about them.  You can help prevent asthma flare-ups by staying away from your triggers.      Trigger                                                          What you can do   Cigarette Smoke  Tobacco smoke can make asthma worse. Do not allow smoking in your home, car or around you.  Be sure no one smokes at a child s day care or school.  If you smoke, ask your health care provider for ways to help you quit.  Ask family members to quit too.  Ask your health care provider for a referral to Quit Plan to help you quit smoking, or call 1-709-634-PLAN.     Colds, Flu, Bronchitis  These are common triggers of asthma. Wash your hands often.  Don t touch your eyes, nose or mouth.  Get a flu shot every year.     Dust Mites  These are tiny bugs that live in cloth or carpet. They are too small to see. Wash sheets and blankets in hot water every week.   Encase pillows and mattress in dust mite proof covers.  Avoid having carpet if you can. If you have carpet, vacuum weekly.   Use a dust mask and HEPA vacuum.   Pollen and Outdoor Mold  Some people are allergic to trees, grass, or weed pollen, or molds.  Try to keep your windows closed.  Limit time out doors when pollen count is high.   Ask you health care provider about taking medicine during allergy season.     Animal Dander  Some people are allergic to skin flakes, urine or saliva from pets with fur or feathers. Keep pets with fur or feathers out of your home.    If you can t keep the pet outdoors, then keep the pet out of your bedroom.  Keep the bedroom door closed.  Keep pets off cloth furniture and away from stuffed toys.     Mice, Rats, and Cockroaches   Some people are allergic to the waste from these pests.   Cover food and garbage.  Clean up spills and food crumbs.  Store grease in the refrigerator.   Keep food out of the bedroom.   Indoor Mold  This can be a trigger if your home has high moisture. Fix leaking faucets, pipes, or other sources of water.   Clean moldy surfaces.  Dehumidify basement if it is damp and smelly.   Smoke, Strong Odors, and Sprays  These can reduce air quality. Stay away from strong odors and sprays, such as perfume, powder, hair spray, paints, smoke incense, paint, cleaning products, candles and new carpet.   Exercise or Sports  Some people with asthma have this trigger. Be active!  Ask your doctor about taking medicine before sports or exercise to prevent symptoms.    Warm up for 5-10 minutes before and after sports or exercise.     Other Triggers of Asthma  Cold air:  Cover your nose and mouth with a scarf.  Sometimes laughing or crying can be a trigger.  Some medicines and food can trigger asthma.

## 2023-11-08 NOTE — PATIENT INSTRUCTIONS
"Recommendations from today's MTM visit:                                                      No changes today.   Flu and Covid vaccines today.   Follow-up with Dr. Juares in February (we'll want to recheck your cholesterol, A1c and your urine protein at that vsiit).     Follow-up: 6 months with MTM, if you have questions, please reach out!     It was great speaking with you today.  I value your experience and would be very thankful for your time in providing feedback in our clinic survey. In the next few days, you may receive an email or text message from [employer] with a link to a survey related to your  clinical pharmacist.\"     To schedule another MTM appointment, please call the clinic directly or you may call the MTM scheduling line at [phone] or toll-free at [phone].     My Clinical Pharmacist's contact information:                                                      Please feel free to contact me with any questions or concerns you have.      Faith Montague, Pharm.D., M.B.A., [facility]  MTM Pharmacist, [facility]  Pager: [phone]  Email: [email or web address]      "

## 2023-12-13 DIAGNOSIS — E11.9 CONTROLLED TYPE 2 DIABETES MELLITUS WITHOUT COMPLICATION, WITHOUT LONG-TERM CURRENT USE OF INSULIN (H): ICD-10-CM

## 2023-12-14 RX ORDER — SEMAGLUTIDE 1.34 MG/ML
INJECTION, SOLUTION SUBCUTANEOUS
Qty: 9 ML | Refills: 1 | Status: SHIPPED | OUTPATIENT
Start: 2023-12-14 | End: 2024-03-12

## 2024-01-01 NOTE — NURSING NOTE
Subjective:     Chief Complaint/Reason for Admission:  Infant is a 0 days Boy Delia Brewster born at 40w4d  Infant male was born on 20[birth year] at 12:06 PM via Vaginal, Spontaneous.    No data found    Maternal History:  The mother is a 22 y.o.   . She  has a past medical history of Asthma.     Prenatal Labs Review:  ABO/Rh:   Lab Results   Component Value Date/Time    GROUPTRH O POS 2024 03:10 PM    GROUPTRH O POS 10/16/2023 09:42 AM      Group B Beta Strep:   Lab Results   Component Value Date/Time    STREPBCULT (A) 2024 09:31 AM     STREPTOCOCCUS AGALACTIAE (GROUP B)  In case of Penicillin allergy, call lab for further testing.  Beta-hemolytic streptococci are routinely susceptible to   penicillins,cephalosporins and carbapenems.  Susceptibility testing not routinely performed        HIV:   HIV 1/2 Ag/Ab   Date Value Ref Range Status   2024 Non-reactive Non-reactive Final        RPR:   Lab Results   Component Value Date/Time    RPR Non-reactive 10/16/2023 09:42 AM      Hepatitis B Surface Antigen:   Lab Results   Component Value Date/Time    HEPBSAG Non-reactive 10/16/2023 09:42 AM      Rubella Immune Status:   Lab Results   Component Value Date/Time    RUBELLAIMMUN Reactive 10/16/2023 09:42 AM        Pregnancy/Delivery Course:  The pregnancy was complicated by pre-eclampsia,  asthma, HSV(on valtrex, no lesions per SSE), and GBS positive status . Prenatal ultrasound revealed normal anatomy. Prenatal care was good. Mother received hydralazine, magnesium, penicillin G x 2, and routine medications related to labor and delivery. Membrane rupture:  Membrane Rupture Date: 24   Membrane Rupture Time:  .  The delivery was complicated by meconium-stained amniotic fluid. NICU attended delivery. Apgar scores:   Apgars      Apgar Component Scores:  1 min.:  5 min.:  10 min.:  15 min.:  20 min.:    Skin color:   1       Heart rate:   2       Reflex irritability:   2       Muscle tone:    "Chief Complaint   Patient presents with     Consult     prostate question       Initial /72  Pulse 90  Resp 14  Ht 5' 10\" (1.778 m)  Wt 232 lb (105.2 kg)  SpO2 100%  BMI 33.29 kg/m2 Estimated body mass index is 33.29 kg/(m^2) as calculated from the following:    Height as of this encounter: 5' 10\" (1.778 m).    Weight as of this encounter: 232 lb (105.2 kg).  BP completed using cuff size: large    Health Maintenance that is potentially due pending provider review:  ACT-form completed today and flu vaccine done-      " "2       Respiratory effort:   2       Total:   9       Apgars assigned by: NICU             Review of Systems    Objective:     Vital Signs (Most Recent)  Temp: 97.7 °F (36.5 °C) (05/31/24 1350)  Pulse: 124 (05/31/24 1350)  Resp: 44 (05/31/24 1350)    Most Recent Weight: 3280 g (7 lb 3.7 oz) (Filed from Delivery Summary) (05/31/24 1206)  Admission Weight: 3280 g (7 lb 3.7 oz) (Filed from Delivery Summary) (05/31/24 1206)  Admission  Head Circumference: 35.6 cm (Filed from Delivery Summary)   Admission Length: Height: 54 cm (21.25") (Filed from Delivery Summary)     Physical Exam     General Appearance:  Healthy-appearing, vigorous infant, , no dysmorphic features  Head:  Normocephalic, atraumatic, anterior fontanelle open soft and flat  Eyes:  PERRL, red reflex present bilaterally, anicteric sclera, no discharge  Ears:  Well-positioned, well-formed pinnae                             Nose:  nares patent, no rhinorrhea  Throat:  oropharynx clear, non-erythematous, mucous membranes moist, palate intact  Neck:  Supple, symmetrical, no torticollis  Chest:  Lungs clear to auscultation, respirations unlabored   Heart:  Regular rate & rhythm, normal S1/S2, no murmurs, rubs, or gallops   Abdomen:  positive bowel sounds, soft, non-tender, non-distended, no masses, umbilical stump clean  Pulses:  Strong equal femoral and brachial pulses, brisk capillary refill  Hips:  Negative Hyman & Ortolani, gluteal creases equal  :  Normal Garry I male genitalia, anus patent, testes descended  Musculosketal: no ai or dimples, no scoliosis or masses, clavicles intact  Extremities:  Well-perfused, warm and dry, no cyanosis  Skin: no rashes, no jaundice  Neuro:  strong cry, good symmetric tone and strength; positive seferino, root and suck   No results found for this or any previous visit (from the past 168 hour(s)).    "

## 2024-01-11 ENCOUNTER — OFFICE VISIT (OUTPATIENT)
Dept: SLEEP MEDICINE | Facility: CLINIC | Age: 49
End: 2024-01-11
Attending: PHYSICIAN ASSISTANT
Payer: COMMERCIAL

## 2024-01-11 DIAGNOSIS — R06.83 SNORING: ICD-10-CM

## 2024-01-11 DIAGNOSIS — E66.811 OBESITY (BMI 30.0-34.9): ICD-10-CM

## 2024-01-11 DIAGNOSIS — I10 BENIGN ESSENTIAL HYPERTENSION: ICD-10-CM

## 2024-01-11 DIAGNOSIS — G47.19 EXCESSIVE DAYTIME SLEEPINESS: ICD-10-CM

## 2024-01-11 PROCEDURE — G0399 HOME SLEEP TEST/TYPE 3 PORTA: HCPCS | Performed by: INTERNAL MEDICINE

## 2024-01-11 NOTE — PROGRESS NOTES
Pt is completing a home sleep test. Pt was instructed on how to put on the Noxturnal T3 device and associated equipment before going to bed and given the opportunity to practice putting it on before leaving the sleep center. Pt was reminded to bring the home sleep test kit back to the center tomorrow, at agreed upon time for download and reporting.   Neck circumference: 46 CM / 18 inches.  Charley Gallegos CMA on 1/11/2024 at 11:22 AM

## 2024-01-12 ENCOUNTER — DOCUMENTATION ONLY (OUTPATIENT)
Dept: SLEEP MEDICINE | Facility: CLINIC | Age: 49
End: 2024-01-12
Attending: PHYSICIAN ASSISTANT
Payer: COMMERCIAL

## 2024-01-15 NOTE — NURSING NOTE
Pt returned HST device. It was downloaded and forwarded data to the clinical specialist for scoring.  Charley Gallegos CMA on 1/15/2024 at 9:35 AM

## 2024-01-15 NOTE — PROGRESS NOTES
HST POST-STUDY QUESTIONNAIRE    What time did you go to bed?  11:10 pm  How long do you think it took to fall asleep?  15 min  What time did you wake up to start the day?  6:30 am  Did you get up during the night at all?  yes  If you woke up, do you remember approximately what time(s)? 1:00 am, 4:30 am using bathroom   Did you have any difficulty with the equipment?  No  Did you us any type of treatment with this study?  None  Was the head of the bed elevated? Yes, slightly  Did you sleep in a recliner?  No  Did you stop using CPAP at least 3 days before this test?  NA  Any other information you'd like us to know?

## 2024-01-18 NOTE — PROGRESS NOTES
This HSAT was performed using a Noxturnal T3 device which recorded snore, sound, movement activity, body position, nasal pressure, oronasal thermal airflow, pulse, oximetry and both chest and abdominal respiratory effort. HSAT data was restricted to the time patient states they were in bed.     HSAT was scored using 1B 4% hypopnea rule.     HST AHI (Non-PAT): 1.2  Snoring was reported as mild and intermittent.  Time with SpO2 below 89% was 0 minutes.   Overall signal quality was good.     Pt will follow up with sleep provider to determine appropriate therapy.

## 2024-01-18 NOTE — PROCEDURES
"HOME SLEEP STUDY INTERPRETATION        Patient: Jeanmarie Mckinley  MRN: [id number]  YOB: 1975  Study Date: 1/11/2024  PCP/Referring Provider: Krista Juares;   Ordering Provider: Sharon Bear PA-C       Indications for Home Study: Jeanmarie Mckinley is a 48 year old male who presents with symptoms suggestive of obstructive sleep apnea.    Estimated body mass index is 33.58 kg/m  as calculated from the following:    Height as of 6/26/23: 1.778 m (5' 10\").    Weight as of 6/26/23: 106.1 kg (234 lb).  Total score - Nora: 15 (6/26/2023 12:35 PM)        Data: A full night home sleep study was performed recording the standard physiologic parameters including body position, movement, sound, nasal pressure, thermal oral airflow, chest and abdominal movements with respiratory inductance plethysmography, and oxygen saturation by pulse oximetry. Pulse rate was estimated by oximetry recording. This study was considered adequate based on > 4 hours of quality oximetry and respiratory recording. As specified by the AASM Manual for the Scoring of Sleep and Associated events, version 2.3, Rule VIII.D 1B, 4% oxygen desaturation scoring for hypopneas is used as a standard of care on all home sleep apnea testing.        Analysis Time:  439 minutes        Respiration:   Sleep Associated Hypoxemia: sustained hypoxemia was not present. Baseline oxygen saturation was 98%.  Time with saturation less than or equal to 88% was 0 minutes. The lowest oxygen saturation was 87%.   Snoring: Snoring was present.  Respiratory events: The home study revealed a presence of 1 obstructive apneas and 1 mixed and central apneas. There were 7 hypopneas resulting in a combined apnea/hypopnea index [AHI] of 1.2 events per hour.  AHI was 3.1 per hour supine, N/A per hour prone, 0 per hour on left side, and 0 per hour on right side.   Pattern: Excluding events noted above, respiratory rate and pattern was Normal.      Position: Percent of " time spent: supine - 39.6%, prone - 0%, on left - 12.5%, on right - 46.7%.      Heart Rate: By pulse oximetry normal rate was noted.       Assessment:   Negative for sleep apnea.  Sleep associated hypoxemia was not present.    Recommendations:  Reassess patient's history and symptoms. If clinical concern for sleep apnea or a sleep fragmenting disorder remains due to history or exam findings, recommend in lab PSG for assessment.        Diagnosis Code(s): Snoring R06.83    Electronically signed by: Marcos Browinng MD, January 18, 2024   Diplomate, American Board of Psychiatry and Neurology, Sleep Medicine

## 2024-01-21 ENCOUNTER — OFFICE VISIT (OUTPATIENT)
Dept: URGENT CARE | Facility: URGENT CARE | Age: 49
End: 2024-01-21
Payer: COMMERCIAL

## 2024-01-21 ENCOUNTER — NURSE TRIAGE (OUTPATIENT)
Dept: NURSING | Facility: CLINIC | Age: 49
End: 2024-01-21

## 2024-01-21 VITALS
HEART RATE: 83 BPM | RESPIRATION RATE: 16 BRPM | SYSTOLIC BLOOD PRESSURE: 120 MMHG | TEMPERATURE: 98.3 F | OXYGEN SATURATION: 100 % | DIASTOLIC BLOOD PRESSURE: 78 MMHG

## 2024-01-21 DIAGNOSIS — H11.32 SUBCONJUNCTIVAL HEMORRHAGE OF LEFT EYE: Primary | ICD-10-CM

## 2024-01-21 PROCEDURE — 99213 OFFICE O/P EST LOW 20 MIN: CPT | Performed by: PHYSICIAN ASSISTANT

## 2024-01-21 NOTE — TELEPHONE ENCOUNTER
Caller states he poked himself in the white part of his eye with a pillow last night. He now has a blood spot on his left eye. It covers the left side of his eyeball. No pain. No vision change.     Triaged to a disposition of see PCP within 24 hrs. He will either go to [facility], or clinic tomorrow.     Suzanne Guzman RN Triage Nurse Advisor 3:09 PM 1/21/2024  Reason for Disposition   Scratch on white of the eye (sclera)    Additional Information   Negative: [1] Major bleeding (e.g., actively dripping or spurting) AND [2] can't be stopped   Negative: Knocked out (unconscious) > 1 minute   Negative: Difficult to awaken or acting confused (e.g., disoriented, slurred speech)   Negative: Severe neck pain   Negative: Sounds like a life-threatening emergency to the triager   Negative: Wound looks infected   Negative: Foreign body in the eye   Negative: Head injury is main concern   Negative: Neck injury is main concern   Negative: Vision is blurred or lost in either eye   Negative: Double vision or unable to look upwards   Negative: Cut on eyelid or eyeball  (Exception: Superficial scratch on eyelid.)   Negative: [1] Bleeding AND [2] won't stop after 10 minutes of direct pressure (using correct technique)   Negative: Skin is split open or gaping (or length > 1/4 inch or 6 mm)   Negative: Bloody or cloudy fluid behind the cornea (clear part)   Negative: Sharp object hit the eye (e.g., metallic chip or flying glass)   Negative: Foreign body (FB) hit eye at high speed (e.g., small metallic chip from hammering, lawnmower, BB gun, explosion)   Negative: Two black eyes (bilateral)   Negative: Sounds like a serious injury to the triager   Negative: [1] SEVERE pain AND [2] not improved 2 hours after pain medicine/ice packs   Negative: [1] Eye pain AND [2] light increases pain   Negative: Flashes of light or floaters in the eye   Negative: [1] Unequal pupils AND [2] new-onset after eye injury   Negative: Constant tearing or  blinking   Negative: Patient keeps the eye covered or refuses to open it   Negative: Suspicious history for the injury   Negative: Patient is confused or is an unreliable provider of information (e.g., dementia, severe intellectual disability, alcohol intoxication)   Negative: Eyelids swollen shut   Negative: Large swelling or bruise (>2 inches or 5 cm)    Protocols used: Eye Injury-A-AH

## 2024-01-21 NOTE — PROGRESS NOTES
VISION   No corrective lenses  Tool used: Aba   Right eye:        10/12.5 (20/25)  Left eye:          10/10 (20/20)  Visual Acuity: Pass

## 2024-01-21 NOTE — PROGRESS NOTES
URGENT CARE VISIT:    SUBJECTIVE:   Jeanmarie Mckinley is a 48 year old male who presents complaining of mild left eye redness since this am. Yesterday he accidentally poked eye with pillow.  Onset/timing was sudden. Associated signs and symptoms include none. He denies vision changes, headache, sore throat, dry cough, fever, chills, and sinus and nasal congestion. He has tried none with no relief of symptoms.  Patient does not wear contacts. Recent sick contacts include none.     PMH:   Past Medical History:   Diagnosis Date    Benign essential hypertension 04/05/2016    control -lisinorpril 10 mg once a day     Diabetes mellitus type 2, controlled (H) 04/05/2016    Metformin 2000/day SEE MTM SEE EYE     High cholesterol     Uncomplicated asthma      Allergies: Dust mites and Seasonal allergies  Medications:   Current Outpatient Medications   Medication Sig Dispense Refill    albuterol (VENTOLIN HFA) 108 (90 Base) MCG/ACT inhaler Inhale 1-2 puffs into the lungs every 6 hours as needed for shortness of breath or wheezing 3 g 3    atorvastatin (LIPITOR) 40 MG tablet Take 1 tablet (40 mg) by mouth daily 90 tablet 3    blood glucose (ACCU-CHEK FASTCLIX) lancing device Lancing device to be used with lancets. 1 each 0    blood glucose (NO BRAND SPECIFIED) test strip Use to test blood sugar 2 times daily or as directed. 200 strip 0    co-enzyme Q-10 100 MG CAPS capsule Take 100 mg by mouth daily      fluticasone-vilanterol (BREO ELLIPTA) 200-25 MCG/ACT inhaler Inhale 1 puff into the lungs daily 3 each 3    lisinopril (ZESTRIL) 20 MG tablet Take 1 tablet (20 mg) by mouth daily 90 tablet 3    metFORMIN (GLUCOPHAGE XR) 500 MG 24 hr tablet TAKE 4 TABLETS (2,000MG)   DAILY WITH FOOD 360 tablet 3    Multiple Vitamin (MULTIVITAMIN ADULT PO) Take 1 tablet by mouth daily      Omega-3 Fatty Acids (FISH OIL) 1200 MG capsule Take 1,200 mg by mouth daily      OZEMPIC, 1 MG/DOSE, 4 MG/3ML pen INJECT 1MG SUBCUTANEOUSLY  EVERY 7 DAYS 9 mL  1    triamcinolone (KENALOG) 0.1 % external cream Apply topically 2 times daily as needed for irritation (eczema) 80 g 0    TURMERIC CURCUMIN PO Take 1 tablet by mouth daily      vitamin D3 (CHOLECALCIFEROL) 50 mcg (2000 units) tablet Take 1 tablet by mouth daily       Social History:   Social History     Socioeconomic History    Marital status:      Spouse name: Not on file    Number of children: Not on file    Years of education: Not on file    Highest education level: Not on file   Occupational History    Not on file   Tobacco Use    Smoking status: Never    Smokeless tobacco: Never   Substance and Sexual Activity    Alcohol use: Yes     Alcohol/week: 0.0 standard drinks of alcohol     Comment: 1 drink per day    Drug use: No    Sexual activity: Yes     Partners: Female   Other Topics Concern    Parent/sibling w/ CABG, MI or angioplasty before 65F 55M? No   Social History Narrative    Working from home &  from  & [employer]     Social Determinants of Health     Financial Resource Strain: Not on file   Food Insecurity: Not on file   Transportation Needs: Not on file   Physical Activity: Not on file   Stress: Not on file   Social Connections: Not on file   Interpersonal Safety: Not on file   Housing Stability: Not on file       ROS: ROS otherwise found to be negative except as noted above.    OBJECTIVE:  /78   Pulse 83   Temp 98.3  F (36.8  C) (Tympanic)   Resp 16   SpO2 100%   General: WDWN in NAD.   Head: normocephalic, atraumatic   Eyes: left lateral conjunctiva has 0.5 cm hemorrhage  Nose: No rhinorrhea.  Cardiac: RRR without murmurs, rubs, or gallops.  Respiratory: LCTAB without adventitious sounds. Non-labored breathing.  Lymph nodes: no cervical adenopathy.  Skin: no rashes or lesions      ASSESSMENT:     ICD-10-CM    1. Subconjunctival hemorrhage of left eye  H11.32            PLAN:  Patient Instructions   Patient was educated on the natural course of small hemorrhage secondary to minor  trauma. Will gradually resolve with time in 1 to 2 weeks. Conservative measures discussed including avoid rubbing eye. See your primary care provider if symptoms worsen or do not improve in 7 days. Seek emergency care if you develop severe eye pain, severe headache, weakness or dizziness.      Patient verbalized understanding and is agreeable to plan. The patient was discharged ambulatory and in stable condition.    Dominga Elizabeth PA-C on 1/21/2024 at 5:07 PM

## 2024-01-21 NOTE — PATIENT INSTRUCTIONS
Patient was educated on the natural course of small hemorrhage secondary to minor trauma. Will gradually resolve with time in 1 to 2 weeks. Conservative measures discussed including avoid rubbing eye. See your primary care provider if symptoms worsen or do not improve in 7 days. Seek emergency care if you develop severe eye pain, severe headache, weakness or dizziness.

## 2024-01-29 ENCOUNTER — PATIENT OUTREACH (OUTPATIENT)
Dept: CARE COORDINATION | Facility: CLINIC | Age: 49
End: 2024-01-29
Payer: COMMERCIAL

## 2024-02-12 ENCOUNTER — PATIENT OUTREACH (OUTPATIENT)
Dept: CARE COORDINATION | Facility: CLINIC | Age: 49
End: 2024-02-12
Payer: COMMERCIAL

## 2024-03-09 ASSESSMENT — ANXIETY QUESTIONNAIRES
1. FEELING NERVOUS, ANXIOUS, OR ON EDGE: NOT AT ALL
5. BEING SO RESTLESS THAT IT IS HARD TO SIT STILL: NOT AT ALL
GAD7 TOTAL SCORE: 1
6. BECOMING EASILY ANNOYED OR IRRITABLE: NOT AT ALL
2. NOT BEING ABLE TO STOP OR CONTROL WORRYING: NOT AT ALL
7. FEELING AFRAID AS IF SOMETHING AWFUL MIGHT HAPPEN: NOT AT ALL
3. WORRYING TOO MUCH ABOUT DIFFERENT THINGS: NOT AT ALL
IF YOU CHECKED OFF ANY PROBLEMS ON THIS QUESTIONNAIRE, HOW DIFFICULT HAVE THESE PROBLEMS MADE IT FOR YOU TO DO YOUR WORK, TAKE CARE OF THINGS AT HOME, OR GET ALONG WITH OTHER PEOPLE: NOT DIFFICULT AT ALL
7. FEELING AFRAID AS IF SOMETHING AWFUL MIGHT HAPPEN: NOT AT ALL
GAD7 TOTAL SCORE: 1
GAD7 TOTAL SCORE: 1
8. IF YOU CHECKED OFF ANY PROBLEMS, HOW DIFFICULT HAVE THESE MADE IT FOR YOU TO DO YOUR WORK, TAKE CARE OF THINGS AT HOME, OR GET ALONG WITH OTHER PEOPLE?: NOT DIFFICULT AT ALL
4. TROUBLE RELAXING: SEVERAL DAYS

## 2024-03-09 ASSESSMENT — PATIENT HEALTH QUESTIONNAIRE - PHQ9
SUM OF ALL RESPONSES TO PHQ QUESTIONS 1-9: 1
SUM OF ALL RESPONSES TO PHQ QUESTIONS 1-9: 1
10. IF YOU CHECKED OFF ANY PROBLEMS, HOW DIFFICULT HAVE THESE PROBLEMS MADE IT FOR YOU TO DO YOUR WORK, TAKE CARE OF THINGS AT HOME, OR GET ALONG WITH OTHER PEOPLE: NOT DIFFICULT AT ALL

## 2024-03-12 ENCOUNTER — OFFICE VISIT (OUTPATIENT)
Dept: FAMILY MEDICINE | Facility: CLINIC | Age: 49
End: 2024-03-12
Payer: COMMERCIAL

## 2024-03-12 VITALS
HEART RATE: 69 BPM | OXYGEN SATURATION: 100 % | HEIGHT: 70 IN | WEIGHT: 224 LBS | BODY MASS INDEX: 32.07 KG/M2 | DIASTOLIC BLOOD PRESSURE: 93 MMHG | SYSTOLIC BLOOD PRESSURE: 141 MMHG | TEMPERATURE: 97.8 F

## 2024-03-12 DIAGNOSIS — E66.09 CLASS 1 OBESITY DUE TO EXCESS CALORIES WITH SERIOUS COMORBIDITY AND BODY MASS INDEX (BMI) OF 32.0 TO 32.9 IN ADULT: ICD-10-CM

## 2024-03-12 DIAGNOSIS — Z00.00 ROUTINE GENERAL MEDICAL EXAMINATION AT A HEALTH CARE FACILITY: Primary | ICD-10-CM

## 2024-03-12 DIAGNOSIS — E11.9 CONTROLLED TYPE 2 DIABETES MELLITUS WITHOUT COMPLICATION, WITHOUT LONG-TERM CURRENT USE OF INSULIN (H): ICD-10-CM

## 2024-03-12 DIAGNOSIS — I10 BENIGN ESSENTIAL HYPERTENSION: ICD-10-CM

## 2024-03-12 DIAGNOSIS — Z71.84 TRAVEL ADVICE ENCOUNTER: ICD-10-CM

## 2024-03-12 DIAGNOSIS — E78.2 MIXED HYPERLIPIDEMIA: ICD-10-CM

## 2024-03-12 DIAGNOSIS — J45.998 ASTHMA, PERSISTENT CONTROLLED: ICD-10-CM

## 2024-03-12 DIAGNOSIS — E66.811 CLASS 1 OBESITY DUE TO EXCESS CALORIES WITH SERIOUS COMORBIDITY AND BODY MASS INDEX (BMI) OF 32.0 TO 32.9 IN ADULT: ICD-10-CM

## 2024-03-12 LAB
ALBUMIN SERPL BCG-MCNC: 4.6 G/DL (ref 3.5–5.2)
ALP SERPL-CCNC: 69 U/L (ref 40–150)
ALT SERPL W P-5'-P-CCNC: 26 U/L (ref 0–70)
ANION GAP SERPL CALCULATED.3IONS-SCNC: 10 MMOL/L (ref 7–15)
AST SERPL W P-5'-P-CCNC: 22 U/L (ref 0–45)
BILIRUB SERPL-MCNC: 2.1 MG/DL
BUN SERPL-MCNC: 16.6 MG/DL (ref 6–20)
CALCIUM SERPL-MCNC: 9.5 MG/DL (ref 8.6–10)
CHLORIDE SERPL-SCNC: 104 MMOL/L (ref 98–107)
CHOLEST SERPL-MCNC: 121 MG/DL
CREAT SERPL-MCNC: 1.15 MG/DL (ref 0.67–1.17)
CREAT UR-MCNC: 122 MG/DL
DEPRECATED HCO3 PLAS-SCNC: 26 MMOL/L (ref 22–29)
EGFRCR SERPLBLD CKD-EPI 2021: 79 ML/MIN/1.73M2
FASTING STATUS PATIENT QL REPORTED: YES
GLUCOSE SERPL-MCNC: 155 MG/DL (ref 70–99)
HBA1C MFR BLD: 5.9 % (ref 0–5.6)
HDLC SERPL-MCNC: 40 MG/DL
LDLC SERPL CALC-MCNC: 56 MG/DL
MICROALBUMIN UR-MCNC: 20.9 MG/L
MICROALBUMIN/CREAT UR: 17.13 MG/G CR (ref 0–17)
NONHDLC SERPL-MCNC: 81 MG/DL
POTASSIUM SERPL-SCNC: 4.1 MMOL/L (ref 3.4–5.3)
PROT SERPL-MCNC: 6.9 G/DL (ref 6.4–8.3)
SODIUM SERPL-SCNC: 140 MMOL/L (ref 135–145)
TRIGL SERPL-MCNC: 127 MG/DL

## 2024-03-12 PROCEDURE — 99214 OFFICE O/P EST MOD 30 MIN: CPT | Mod: 25 | Performed by: FAMILY MEDICINE

## 2024-03-12 PROCEDURE — 90471 IMMUNIZATION ADMIN: CPT | Performed by: FAMILY MEDICINE

## 2024-03-12 PROCEDURE — 90636 HEP A/HEP B VACC ADULT IM: CPT | Performed by: FAMILY MEDICINE

## 2024-03-12 PROCEDURE — 80053 COMPREHEN METABOLIC PANEL: CPT | Performed by: FAMILY MEDICINE

## 2024-03-12 PROCEDURE — 82570 ASSAY OF URINE CREATININE: CPT | Performed by: FAMILY MEDICINE

## 2024-03-12 PROCEDURE — 80061 LIPID PANEL: CPT | Performed by: FAMILY MEDICINE

## 2024-03-12 PROCEDURE — 36415 COLL VENOUS BLD VENIPUNCTURE: CPT | Performed by: FAMILY MEDICINE

## 2024-03-12 PROCEDURE — 99207 PR FOOT EXAM NO CHARGE: CPT | Performed by: FAMILY MEDICINE

## 2024-03-12 PROCEDURE — 99396 PREV VISIT EST AGE 40-64: CPT | Performed by: FAMILY MEDICINE

## 2024-03-12 PROCEDURE — 83036 HEMOGLOBIN GLYCOSYLATED A1C: CPT | Performed by: FAMILY MEDICINE

## 2024-03-12 PROCEDURE — 82043 UR ALBUMIN QUANTITATIVE: CPT | Performed by: FAMILY MEDICINE

## 2024-03-12 RX ORDER — SEMAGLUTIDE 1.34 MG/ML
INJECTION, SOLUTION SUBCUTANEOUS
Qty: 9 ML | Refills: 3 | Status: SHIPPED | OUTPATIENT
Start: 2024-03-12

## 2024-03-12 RX ORDER — LISINOPRIL 20 MG/1
20 TABLET ORAL DAILY
Qty: 90 TABLET | Refills: 3 | Status: SHIPPED | OUTPATIENT
Start: 2024-03-12 | End: 2024-04-29

## 2024-03-12 RX ORDER — METFORMIN HCL 500 MG
TABLET, EXTENDED RELEASE 24 HR ORAL
Qty: 360 TABLET | Refills: 3 | Status: SHIPPED | OUTPATIENT
Start: 2024-03-12 | End: 2024-09-12

## 2024-03-12 RX ORDER — ATORVASTATIN CALCIUM 40 MG/1
40 TABLET, FILM COATED ORAL DAILY
Qty: 90 TABLET | Refills: 3 | Status: SHIPPED | OUTPATIENT
Start: 2024-03-12

## 2024-03-12 RX ORDER — ALBUTEROL SULFATE 90 UG/1
1-2 AEROSOL, METERED RESPIRATORY (INHALATION) EVERY 6 HOURS PRN
Qty: 3 G | Refills: 3 | Status: SHIPPED | OUTPATIENT
Start: 2024-03-12

## 2024-03-12 RX ORDER — FLUTICASONE FUROATE AND VILANTEROL 200; 25 UG/1; UG/1
1 POWDER RESPIRATORY (INHALATION) DAILY
Qty: 3 EACH | Refills: 3 | Status: SHIPPED | OUTPATIENT
Start: 2024-03-12

## 2024-03-12 NOTE — NURSING NOTE
Per orders of , injection of Twinrix given by Kierra Oswald RN. Prior to immunization administration, verified patients identity using patient s name and date of birth. Patient instructed to remain in clinic for 15 minutes afterwards, and to report any adverse reaction to me or clinic staff immediately.    Kierra Oswald RN on 3/12/2024 at 10:15 AM.    Please see Immunization Activity for additional information.

## 2024-03-12 NOTE — PROGRESS NOTES
Preventive Care Visit  [facility]  Krista Juares MD, Family Medicine  Mar 12, 2024    Assessment & Plan   1. Routine general medical examination at a health care facility  colonoscopy- UTD/normal 6/2022- next in 10 yrs 6/2032     2. Controlled type 2 diabetes mellitus without complication, without long-term current use of insulin (H)  Plan: well controlled Diabetes     - annual eye exam due April 2024 & he knows    - Lipid Profile (Chol, Trig, HDL, LDL calc)  - Comprehensive metabolic panel (BMP + Alb, Alk Phos, ALT, AST, Total. Bili, TP)  - Albumin Random Urine Quantitative with Creat Ratio    - Semaglutide, 1 MG/DOSE, (OZEMPIC, 1 MG/DOSE,) 4 MG/3ML pen; INJECT 1MG SUBCUTANEOUSLY  EVERY 7 DAYS  Dispense: 9 mL; Refill: 3  - metFORMIN (GLUCOPHAGE XR) 500 MG 24 hr tablet; TAKE 4 TABLETS (2,000MG)   DAILY WITH FOOD  Dispense: 360 tablet; Refill: 3  - lisinopril (ZESTRIL) 20 MG tablet; Take 1 tablet (20 mg) by mouth daily  Dispense: 90 tablet; Refill: 3  - FOOT EXAM- normal exam.    3. Benign essential hypertension  Plan: he has not take his lisinopril today and Blood pressure possibly hih due to that. Advised to check Blood pressure at home and send me Siri message next Monday- if not at target Blood pressure of < 140/90- then will need increase   - Comprehensive metabolic panel (BMP + Alb, Alk Phos, ALT, AST, Total. Bili, TP)  - lisinopril (ZESTRIL) 20 MG tablet; Take 1 tablet (20 mg) by mouth daily  Dispense: 90 tablet; Refill: 3    4. Mixed hyperlipidemia  Plan: fasting lipid pending.  - atorvastatin (LIPITOR) 40 MG tablet; Take 1 tablet (40 mg) by mouth daily  Dispense: 90 tablet; Refill: 3    5. Asthma, persistent controlled  Plan: ACT reviewed and medications refilled  - fluticasone-vilanterol (BREO ELLIPTA) 200-25 MCG/ACT inhaler; Inhale 1 puff into the lungs daily  Dispense: 3 each; Refill: 3  - albuterol (VENTOLIN HFA) 108 (90 Base) MCG/ACT inhaler; Inhale 1-2 puffs into the lungs  "every 6 hours as needed for shortness of breath or wheezing  Dispense: 3 g; Refill: 3        Ordering of each unique test  Prescription drug management        BMI  Estimated body mass index is 32.14 kg/m  as calculated from the following:    Height as of this encounter: 1.778 m (5' 10\").    Weight as of this encounter: 101.6 kg (224 lb).   Weight management plan: Discussed healthy diet and exercise guidelines      Work on weight loss  Regular exercise    Subjective   Jeanmarie is a 48 year old, presenting for the following:  Physical        3/12/2024     9:14 AM   Additional Questions   Roomed by Kaylee HOLLOWAY MA   Accompanied by self         3/12/2024     9:14 AM   Patient Reported Additional Medications   Patient reports taking the following new medications none        Health Care Directive  Patient does not have a Health Care Directive or Living Will:     HPI  General Health   How would you rate your overall physical health? Good   Feel stress (tense, anxious, or unable to sleep) Sleep issue    (!) STRESS CONCERN      2/27/2023   Nutrition   Three or more servings of calcium each day? Yes   Diet: diabetic    breakfast skipped         2/27/2023   Exercise   Frequency of exercise: 4-5 days/week            2/27/2023   Dental   Dentist two times every year? Yes          Today's PHQ-9 Score:       3/9/2024    10:20 AM   PHQ-9 SCORE   PHQ-9 Total Score MyChart 1 (Minimal depression)   PHQ-9 Total Score 1         2/27/2023   Substance Use   Alcohol more than 3/day or more than 7/wk Yes   How often do you have a drink containing alcohol 2 to 3 times a week   How many alcohol drinks on typical day 1 or 2   How often do you have 5+ drinks at one occasion Less than monthly   Audit 2/3 Score 1   How often not able to stop drinking once started Never   How often failed to do what normally expected Never   How often needed first drink in am after a heavy drinking session Never   How often feeling of guilt or remorse after drinking " "Never   How often unable to remember what happened the night before Never   Have you or someone else been injured because of your drinking No   Has anyone been concerned or suggested you cut down on drinking No   TOTAL SCORE - AUDIT 4     Social History     Tobacco Use    Smoking status: Never    Smokeless tobacco: Never   Substance Use Topics    Alcohol use: Yes     Comment: 1 drink per day    Drug use: No       ASCVD Risk   The 10-year ASCVD risk score (Tj PERSON, et al., 2019) is: 17%    Values used to calculate the score:      Age: 48 years      Sex: Male      Is Non- : Yes      Diabetic: Yes      Tobacco smoker: No      Systolic Blood Pressure: 141 mmHg      Is BP treated: Yes      HDL Cholesterol: 48 mg/dL      Total Cholesterol: 180 mg/dL         Reviewed and updated as needed this visit by Provider   Tobacco  Allergies  Meds  Problems  Med Hx  Surg Hx  Fam Hx          Wt Readings from Last 5 Encounters:   03/12/24 101.6 kg (224 lb)   06/26/23 106.1 kg (234 lb)   06/21/23 107.5 kg (237 lb)   02/27/23 107 kg (236 lb)   06/06/22 107.5 kg (237 lb)      Lab work is in process      Review of Systems  Constitutional, neuro, ENT, endocrine, pulmonary, cardiac, gastrointestinal, genitourinary, musculoskeletal, integument and psychiatric systems are negative, except as otherwise noted.     Objective    Exam  BP (!) 141/93   Pulse 69   Temp 97.8  F (36.6  C) (Temporal)   Ht 1.778 m (5' 10\")   Wt 101.6 kg (224 lb)   SpO2 100%   BMI 32.14 kg/m     Estimated body mass index is 32.14 kg/m  as calculated from the following:    Height as of this encounter: 1.778 m (5' 10\").    Weight as of this encounter: 101.6 kg (224 lb).    Physical Exam  GENERAL: alert and no distress  EYES: Eyes grossly normal to inspection, PERRL and conjunctivae and sclerae normal  HENT: ear canals and TM's normal, nose and mouth without ulcers or lesions  NECK: no adenopathy, no asymmetry, masses, or " scars  RESP: lungs clear to auscultation - no rales, rhonchi or wheezes  CV: regular rate and rhythm, normal S1 S2, no S3 or S4, no murmur, click or rub, no peripheral edema  ABDOMEN: soft, nontender, no hepatosplenomegaly, no masses and bowel sounds normal  MS: no gross musculoskeletal defects noted, no edema  SKIN: no suspicious lesions or rashes  NEURO: Normal strength and tone, mentation intact and speech normal  PSYCH: mentation appears normal, affect normal/bright  Diabetic foot exam: normal DP and PT pulses, no trophic changes or ulcerative lesions, normal sensory exam, and normal monofilament exam      Signed Electronically by: Krista Juares MD    Answers submitted by the patient for this visit:  Patient Health Questionnaire (Submitted on 3/9/2024)  If you checked off any problems, how difficult have these problems made it for you to do your work, take care of things at home, or get along with other people?: Not difficult at all  PHQ9 TOTAL SCORE: 1  SHARIF-7 (Submitted on 3/9/2024)  SHARIF 7 TOTAL SCORE: 1

## 2024-03-12 NOTE — PATIENT INSTRUCTIONS
Preventive Care Advice   This is general advice given by our system to help you stay healthy. However, your care team may have specific advice just for you. Please talk to your care team about your preventive care needs.  Nutrition  Eat 5 or more servings of fruits and vegetables each day.  Try wheat bread, brown rice and whole grain pasta (instead of white bread, rice, and pasta).  Get enough calcium and vitamin D. Check the label on foods and aim for 100% of the RDA (recommended daily allowance).  Lifestyle  Exercise at least 150 minutes each week   (30 minutes a day, 5 days a week).  Do muscle strengthening activities 2 days a week. These help control your weight and prevent disease.  No smoking.  Wear sunscreen to prevent skin cancer.  Have a dental exam and cleaning every 6 months.  Yearly exams  See your health care team every year to talk about:  Any changes in your health.  Any medicines your care team has prescribed.  Preventive care, family planning, and ways to prevent chronic diseases.  Shots (vaccines)   HPV shots (up to age 26), if you've never had them before.  Hepatitis B shots (up to age 59), if you've never had them before.  COVID-19 shot: Get this shot when it's due.  Flu shot: Get a flu shot every year.  Tetanus shot: Get a tetanus shot every 10 years.  Pneumococcal, hepatitis A, and RSV shots: Ask your care team if you need these based on your risk.  Shingles shot (for age 50 and up).  General health tests  Diabetes screening:  Starting at age 35, Get screened for diabetes at least every 3 years.  If you are younger than age 35, ask your care team if you should be screened for diabetes.  Cholesterol test: At age 39, start having a cholesterol test every 5 years, or more often if advised.  Bone density scan (DEXA): At age 50, ask your care team if you should have this scan for osteoporosis (brittle bones).  Hepatitis C: Get tested at least once in your life.  STIs (sexually transmitted  infections)  Before age 24: Ask your care team if you should be screened for STIs.  After age 24: Get screened for STIs if you're at risk. You are at risk for STIs (including HIV) if:  You are sexually active with more than one person.  You don't use condoms every time.  You or a partner was diagnosed with a sexually transmitted infection.  If you are at risk for HIV, ask about PrEP medicine to prevent HIV.  Get tested for HIV at least once in your life, whether you are at risk for HIV or not.  Cancer screening tests  Cervical cancer screening: If you have a cervix, begin getting regular cervical cancer screening tests at age 21. Most people who have regular screenings with normal results can stop after age 65. Talk about this with your provider.  Breast cancer scan (mammogram): If you've ever had breasts, begin having regular mammograms starting at age 40. This is a scan to check for breast cancer.  Colon cancer screening: It is important to start screening for colon cancer at age 45.  Have a colonoscopy test every 10 years (or more often if you're at risk) Or, ask your provider about stool tests like a FIT test every year or Cologuard test every 3 years.  To learn more about your testing options, visit: https://www.Odeo/848645.pdf.  For help making a decision, visit: https://bit.ly/xz80133.  Prostate cancer screening test: If you have a prostate and are age 55 to 69, ask your provider if you would benefit from a yearly prostate cancer screening test.  Lung cancer screening: If you are a current or former smoker age 50 to 80, ask your care team if ongoing lung cancer screenings are right for you.  For informational purposes only. Not to replace the advice of your health care provider. Copyright   2023 [employer]. All rights reserved. Clinically reviewed by the [facility] Transitions Program. [employer] [id number] - REV 01/24.

## 2024-04-10 ENCOUNTER — MYC MEDICAL ADVICE (OUTPATIENT)
Dept: FAMILY MEDICINE | Facility: CLINIC | Age: 49
End: 2024-04-10
Payer: COMMERCIAL

## 2024-04-10 DIAGNOSIS — M54.50 CHRONIC BILATERAL LOW BACK PAIN WITHOUT SCIATICA: Primary | ICD-10-CM

## 2024-04-10 DIAGNOSIS — G89.29 CHRONIC BILATERAL LOW BACK PAIN WITHOUT SCIATICA: Primary | ICD-10-CM

## 2024-04-11 NOTE — TELEPHONE ENCOUNTER
AS,  Please see below Reissuedhart message and advise.  Recent OV 3/12/24 - please advise if additional OV recommended for BP concern  Pended referral if approved  Team can assist with faxing  Thanks,  Alcira MCCABE RN

## 2024-04-29 ENCOUNTER — VIRTUAL VISIT (OUTPATIENT)
Dept: FAMILY MEDICINE | Facility: CLINIC | Age: 49
End: 2024-04-29
Payer: COMMERCIAL

## 2024-04-29 DIAGNOSIS — I10 HYPERTENSION GOAL BP (BLOOD PRESSURE) < 130/80: Primary | ICD-10-CM

## 2024-04-29 DIAGNOSIS — E11.9 CONTROLLED TYPE 2 DIABETES MELLITUS WITHOUT COMPLICATION, WITHOUT LONG-TERM CURRENT USE OF INSULIN (H): ICD-10-CM

## 2024-04-29 PROCEDURE — 99213 OFFICE O/P EST LOW 20 MIN: CPT | Mod: 93 | Performed by: FAMILY MEDICINE

## 2024-04-29 RX ORDER — LISINOPRIL 40 MG/1
40 TABLET ORAL DAILY
Qty: 90 TABLET | Refills: 1 | Status: SHIPPED | OUTPATIENT
Start: 2024-04-29

## 2024-04-29 ASSESSMENT — ASTHMA QUESTIONNAIRES
QUESTION_1 LAST FOUR WEEKS HOW MUCH OF THE TIME DID YOUR ASTHMA KEEP YOU FROM GETTING AS MUCH DONE AT WORK, SCHOOL OR AT HOME: NONE OF THE TIME
QUESTION_5 LAST FOUR WEEKS HOW WOULD YOU RATE YOUR ASTHMA CONTROL: COMPLETELY CONTROLLED
QUESTION_3 LAST FOUR WEEKS HOW OFTEN DID YOUR ASTHMA SYMPTOMS (WHEEZING, COUGHING, SHORTNESS OF BREATH, CHEST TIGHTNESS OR PAIN) WAKE YOU UP AT NIGHT OR EARLIER THAN USUAL IN THE MORNING: NOT AT ALL
QUESTION_2 LAST FOUR WEEKS HOW OFTEN HAVE YOU HAD SHORTNESS OF BREATH: NOT AT ALL
QUESTION_4 LAST FOUR WEEKS HOW OFTEN HAVE YOU USED YOUR RESCUE INHALER OR NEBULIZER MEDICATION (SUCH AS ALBUTEROL): NOT AT ALL
ACT_TOTALSCORE: 25
ACT_TOTALSCORE: 25

## 2024-04-29 NOTE — PROGRESS NOTES
Jeanmarie is a 48 year old who is being evaluated via a billable telephone visit.    What phone number would you like to be contacted at? [phone]  How would you like to obtain your AVS? [email or web address]  Originating Location (pt. Location): Home    Distant Location (provider location):  On-site    Assessment & Plan     1. Hypertension goal BP (blood pressure) < 130/80  Plan:increase lisinopril 40 mg once a day   [employer] message to up date BP in about 2-4 weeks  If not at target- will need to add another medications - amlodipine to control Blood pressure    - lisinopril (ZESTRIL) 40 MG tablet; Take 1 tablet (40 mg) by mouth daily  Dispense: 90 tablet; Refill: 1    2. Controlled type 2 diabetes mellitus without complication, without long-term current use of insulin (H)  Plan: annual Eye exam is scheduled for May 2024  Diabetes  otherwise well controlled on metformin and oze,pic   - lisinopril (ZESTRIL) 40 MG tablet; Take 1 tablet (40 mg) by mouth daily  Dispense: 90 tablet; Refill: 1    Hemoglobin A1C   Date Value Ref Range Status   03/12/2024 5.9 (H) 0.0 - 5.6 % Final     Comment:     Normal <5.7%   Prediabetes 5.7-6.4%    Diabetes 6.5% or higher     Note: Adopted from ADA consensus guidelines.   01/28/2021 7.4 (H) 0 - 5.6 % Final     Comment:     Results confirmed by repeat test  Normal <5.7% Prediabetes 5.7-6.4%  Diabetes 6.5% or higher - adopted from ADA   consensus guidelines.        Prescription drug management              Subjective   Jeanmarie is a 48 year old, presenting for the following health issues:  No chief complaint on file.        4/29/2024     4:32 PM   Additional Questions   Roomed by Marya MATOS   Accompanied by self     History of Present Illness       Hypertension: He presents for follow up of hypertension.  He does not check blood pressure  regularly outside of the clinic. Outside blood pressures have been over 140/90. He follows a low salt diet.     He eats 2-3 servings of fruits and vegetables daily.He  consumes 0 sweetened beverage(s) daily.He exercises with enough effort to increase his heart rate 20 to 29 minutes per day.  He exercises with enough effort to increase his heart rate 4 days per week.   He is taking medications regularly.     Has been on lisinopril 30 mg once daily  Blood pressure at home elevated 140-150/ 90.    No additional concerns             Review of Systems  Constitutional, neuro, ENT, endocrine, pulmonary, cardiac, gastrointestinal, genitourinary, musculoskeletal, integument and psychiatric systems are negative, except as otherwise noted.      Objective           Vitals:  No vitals were obtained today due to virtual visit.    Physical Exam   General: Alert and no distress //Respiratory: No audible wheeze, cough, or shortness of breath // Psychiatric:  Appropriate affect, tone, and pace of words            Phone call duration: 6 minutes  Signed Electronically by: Krista Juares MD

## 2024-04-29 NOTE — PATIENT INSTRUCTIONS
1. Hypertension goal BP (blood pressure) < 130/80  Plan:increase lisinopril 40 mg once a day   [employer] message to up date BP in about 2-4 weeks  If not at target- will need to add another medications - amlodipine to control Blood pressure    - lisinopril (ZESTRIL) 40 MG tablet; Take 1 tablet (40 mg) by mouth daily  Dispense: 90 tablet; Refill: 1

## 2024-05-13 ENCOUNTER — OFFICE VISIT (OUTPATIENT)
Dept: OPHTHALMOLOGY | Facility: CLINIC | Age: 49
End: 2024-05-13
Attending: FAMILY MEDICINE
Payer: COMMERCIAL

## 2024-05-13 DIAGNOSIS — H52.12 MYOPIA WITH PRESBYOPIA, LEFT: ICD-10-CM

## 2024-05-13 DIAGNOSIS — H40.003 GLAUCOMA SUSPECT, BILATERAL: Primary | ICD-10-CM

## 2024-05-13 DIAGNOSIS — H52.4 MYOPIA OF RIGHT EYE WITH ASTIGMATISM AND PRESBYOPIA: ICD-10-CM

## 2024-05-13 DIAGNOSIS — H52.11 MYOPIA OF RIGHT EYE WITH ASTIGMATISM AND PRESBYOPIA: ICD-10-CM

## 2024-05-13 DIAGNOSIS — E11.9 DIABETES MELLITUS TYPE 2 WITHOUT RETINOPATHY (H): ICD-10-CM

## 2024-05-13 DIAGNOSIS — H52.201 MYOPIA OF RIGHT EYE WITH ASTIGMATISM AND PRESBYOPIA: ICD-10-CM

## 2024-05-13 DIAGNOSIS — H52.4 MYOPIA WITH PRESBYOPIA, LEFT: ICD-10-CM

## 2024-05-13 PROCEDURE — 99213 OFFICE O/P EST LOW 20 MIN: CPT | Performed by: STUDENT IN AN ORGANIZED HEALTH CARE EDUCATION/TRAINING PROGRAM

## 2024-05-13 PROCEDURE — 92133 CPTRZD OPH DX IMG PST SGM ON: CPT | Performed by: STUDENT IN AN ORGANIZED HEALTH CARE EDUCATION/TRAINING PROGRAM

## 2024-05-13 PROCEDURE — 92014 COMPRE OPH EXAM EST PT 1/>: CPT | Performed by: STUDENT IN AN ORGANIZED HEALTH CARE EDUCATION/TRAINING PROGRAM

## 2024-05-13 ASSESSMENT — EXTERNAL EXAM - RIGHT EYE: OD_EXAM: NORMAL

## 2024-05-13 ASSESSMENT — SLIT LAMP EXAM - LIDS
COMMENTS: NORMAL
COMMENTS: NORMAL

## 2024-05-13 ASSESSMENT — TONOMETRY
IOP_METHOD: TONOPEN
OS_IOP_MMHG: 10
OD_IOP_MMHG: 12

## 2024-05-13 ASSESSMENT — VISUAL ACUITY
OS_SC: 20/20
OD_SC: 20/30
OD_PH_SC: 20/20
CORRECTION_TYPE: GLASSES
METHOD: SNELLEN - LINEAR

## 2024-05-13 ASSESSMENT — REFRACTION_WEARINGRX
OD_CYLINDER: +0.25
OD_ADD: +1.75
OS_ADD: +1.75
OS_SPHERE: -0.50
OD_AXIS: 010
OD_SPHERE: -1.00

## 2024-05-13 ASSESSMENT — CONF VISUAL FIELD
OS_SUPERIOR_NASAL_RESTRICTION: 0
OS_INFERIOR_NASAL_RESTRICTION: 0
OD_SUPERIOR_NASAL_RESTRICTION: 0
OD_INFERIOR_NASAL_RESTRICTION: 0
METHOD: COUNTING FINGERS
OS_NORMAL: 1
OS_INFERIOR_TEMPORAL_RESTRICTION: 0
OD_NORMAL: 1
OS_SUPERIOR_TEMPORAL_RESTRICTION: 0
OD_SUPERIOR_TEMPORAL_RESTRICTION: 0
OD_INFERIOR_TEMPORAL_RESTRICTION: 0

## 2024-05-13 ASSESSMENT — CUP TO DISC RATIO
OS_RATIO: 0.7
OD_RATIO: 0.75

## 2024-05-13 ASSESSMENT — EXTERNAL EXAM - LEFT EYE: OS_EXAM: NORMAL

## 2024-05-13 ASSESSMENT — REFRACTION_MANIFEST
OD_CYLINDER: +0.50
OS_CYLINDER: SPHERE
OS_ADD: +1.50
OD_AXIS: 028
OD_ADD: +1.50
OS_SPHERE: -0.75
OD_SPHERE: -1.50

## 2024-05-13 NOTE — LETTER
5/13/2024       RE: Jeanmarie Mckilney  [address]  [facility] [address]     Dear Colleague,    Thank you for referring your patient, Jeanmarie Mckinley, to the [facility] [place] at [facility]. Please see a copy of my visit note below.    HPI       Annual Eye Exam    In both eyes.  This started 1 year ago.  Associated symptoms include Negative for double vision, dryness, eye pain, redness, photophobia, flashes and floaters.  Treatments tried include no treatments. Additional comments: Annual exam today.             Comments    A1C 5.9 last checked 3/2024  No glasses as of today yet for distance or near.  Pt states does not want or need glasses at this time.    GEMMA Mills 8:58 AM May 13, 2024   [facility]  [address] [place] [address], [address] Floor, [place] [address]  PH: [phone] Fax: [phone]            Last edited by Katie Azevedo COA on 5/13/2024  8:58 AM.          Review of systems for the eyes was negative other than the pertinent positives/negatives listed in the HPI.    Ocular Meds: none    Ocular Hx: Glaucoma suspect OU    FOHx: Uncle blind but unsure what from (glaucoma vs diabetes)    PMHx:     Past Medical History:   Diagnosis Date     Benign essential hypertension 04/05/2016    control -lisinorpril 10 mg once a day      Diabetes mellitus type 2, controlled (H) 04/05/2016    Metformin 2000/day SEE MTM SEE EYE      High cholesterol      Uncomplicated asthma        Assessment & Plan     Jeanmarie Mckinley is a 48 year old male with the following diagnoses:    T2DM without Retinopathy OU  - strict BP/BG control  - patient understands importance  of good blood glucose control in order to reduce the risk of developing diabetic retinopathy  - yearly DFE    Glaucoma Suspect OU  - based off of increased c/d ratio  - stable nerve appearance. Normal OCT RNFL today and stable to prior and normal VF 12/23/21. IOP wnl.  Large disc with large cup, reassuring.  - can observe yearly    Dry Eyes OU  - PFATs QID and PRN OU    Myopia of right eye with astigmatism and prebyopia  Myopia with presbyopia, left eye  - refraction reviewed and patient not interested at this time as happy without Rx  - can use over the counter readers    Counseled return/RD precautions  Letter to referring provider    Patient disposition:   Return in about 1 year (around 5/13/2025) for Annual Visit, or sooner changes.    Attending Physician Attestation:  Complete documentation of historical and exam elements from today's encounter can be found in the full encounter summary report (not reduplicated in this progress note).  I personally obtained the chief complaint(s) and history of present illness.  I confirmed and edited as necessary the review of systems, past medical/surgical history, family history, social history, and examination findings as documented by others; and I examined the patient myself.  I personally reviewed the relevant tests, images, and reports as documented above.  I formulated and edited as necessary the assessment and plan and discussed the findings and management plan with the patient and family. . - Suzanne Hercules MD        Again, thank you for allowing me to participate in the care of your patient.      Sincerely,    Suzanne Hercules MD

## 2024-05-13 NOTE — PROGRESS NOTES
HPI       Annual Eye Exam    In both eyes.  This started 1 year ago.  Associated symptoms include Negative for double vision, dryness, eye pain, redness, photophobia, flashes and floaters.  Treatments tried include no treatments. Additional comments: Annual exam today.             Comments    A1C 5.9 last checked 3/2024  No glasses as of today yet for distance or near.  Pt states does not want or need glasses at this time.    GEMMA Mills 8:58 AM May 13, 2024   [facility]  [address] [place] [address], [address] Floor, [place] [address]  PH: [phone] Fax: [phone]            Last edited by Katie Azevedo COA on 5/13/2024  8:58 AM.          Review of systems for the eyes was negative other than the pertinent positives/negatives listed in the HPI.    Ocular Meds: none    Ocular Hx: Glaucoma suspect OU    FOHx: Uncle blind but unsure what from (glaucoma vs diabetes)    PMHx:     Past Medical History:   Diagnosis Date    Benign essential hypertension 04/05/2016    control -lisinorpril 10 mg once a day     Diabetes mellitus type 2, controlled (H) 04/05/2016    Metformin 2000/day SEE MTM SEE EYE     High cholesterol     Uncomplicated asthma        Assessment & Plan     Jeanmarie Mckinley is a 48 year old male with the following diagnoses:    T2DM without Retinopathy OU  - strict BP/BG control  - patient understands importance  of good blood glucose control in order to reduce the risk of developing diabetic retinopathy  - yearly DFE    Glaucoma Suspect OU  - based off of increased c/d ratio  - stable nerve appearance. Normal OCT RNFL today and stable to prior and normal VF 12/23/21. IOP wnl. Large disc with large cup, reassuring.  - can observe yearly    Dry Eyes OU  - PFATs QID and PRN OU    Myopia of right eye with astigmatism and prebyopia  Myopia with presbyopia, left eye  - refraction reviewed and patient not interested at this time as happy without Rx  - can use over the counter readers    Counseled  return/RD precautions  Letter to referring provider    Patient disposition:   Return in about 1 year (around 5/13/2025) for Annual Visit, or sooner changes.    Attending Physician Attestation:  Complete documentation of historical and exam elements from today's encounter can be found in the full encounter summary report (not reduplicated in this progress note).  I personally obtained the chief complaint(s) and history of present illness.  I confirmed and edited as necessary the review of systems, past medical/surgical history, family history, social history, and examination findings as documented by others; and I examined the patient myself.  I personally reviewed the relevant tests, images, and reports as documented above.  I formulated and edited as necessary the assessment and plan and discussed the findings and management plan with the patient and family. . - Suzanne Hercules MD

## 2024-06-13 ENCOUNTER — OFFICE VISIT (OUTPATIENT)
Dept: PHARMACY | Facility: CLINIC | Age: 49
End: 2024-06-13
Payer: COMMERCIAL

## 2024-06-13 VITALS — SYSTOLIC BLOOD PRESSURE: 126 MMHG | DIASTOLIC BLOOD PRESSURE: 90 MMHG

## 2024-06-13 DIAGNOSIS — J45.998 ASTHMA, PERSISTENT CONTROLLED: ICD-10-CM

## 2024-06-13 DIAGNOSIS — E11.65 TYPE 2 DIABETES MELLITUS WITH HYPERGLYCEMIA, WITHOUT LONG-TERM CURRENT USE OF INSULIN (H): Primary | ICD-10-CM

## 2024-06-13 DIAGNOSIS — Z78.9 TAKES DIETARY SUPPLEMENTS: ICD-10-CM

## 2024-06-13 DIAGNOSIS — I10 BENIGN ESSENTIAL HYPERTENSION: ICD-10-CM

## 2024-06-13 DIAGNOSIS — E78.2 MIXED HYPERLIPIDEMIA: ICD-10-CM

## 2024-06-13 PROCEDURE — 99605 MTMS BY PHARM NP 15 MIN: CPT

## 2024-06-13 PROCEDURE — 99607 MTMS BY PHARM ADDL 15 MIN: CPT

## 2024-06-13 NOTE — PATIENT INSTRUCTIONS
"Recommendations from today's MTM visit:                                                    MTM (medication therapy management) is a service provided by a clinical pharmacist designed to help you get the most of out of your medicines.   Today we reviewed what your medicines are for, how to know if they are working, that your medicines are safe and how to make your medicine regimen as easy as possible.      Try taking Metformin  mg once in the morning and once in the evening. Continue to monitor you bowel movements.    If you would like to check blood sugars once per week it might give you more information about blood sugar changes after reducing the dose of Metformin.   Take your blood pressures daily for one week.     Follow-up: [facility] check in on blood pressures in one week. Return in about 13 weeks (around 9/12/2024) for Follow up, in person, medication therapy management. At 9:00 AM    It was great speaking with you today.  I value your experience and would be very thankful for your time in providing feedback in our clinic survey. In the next few days, you may receive an email or text message from [employer] with a link to a survey related to your  clinical pharmacist.\"     To schedule another MTM appointment, please call the clinic directly or you may call the MTM scheduling line at [phone] or toll-free at [phone].     My Clinical Pharmacist's contact information:                                                      Please feel free to contact me with any questions or concerns you have.      Alison Faria, Hans  Medication Therapy Management (MTM) Resident  Pager: [phone]    "

## 2024-06-13 NOTE — PROGRESS NOTES
Medication Therapy Management (MTM) Encounter    ASSESSMENT:                            Medication Adherence/Access: See below for considerations    Diabetes:   Patient is meeting A1c goal of < 7%. Patient's bowel movements have increased in frequency, and may benefit from reducing the dose of Metformin which could be contributing to the increased GI symptoms. Given his well controlled A1c, dose reduction is reasonable and would recheck an A1c at next appointment. Microalbumin is at goal < 30mg/g is taking ACEi for hypertension and is appropriately on a statin. Patient appropriately not taking aspirin due to age.    Hypertension:   Patient is near meeting blood pressure goal of < 130/80mmHg. Today in visit blood pressure was checked twice with the same reading, diastolic pressure is elevated. Patient may benefit from taking blood pressure more regularly. Will continue to monitor.    Hyperlipidemia:   Stable. Meeting ADA LDL goal of <70 mg/dL.     Asthma:   Patient's symptoms managed with current dosing and should consider starting SMART therapy in the future given current control.    Supplements:  Stable.    PLAN:                            Try taking Metformin  mg once in the morning and once in the evening. Continue to monitor you bowel movements.    If you would like to check blood sugars once per week it might give you more information about changes after reducing the dose of Metformin.  Take your blood pressure daily for one week.    [facility] blood pressures in one week. Return in about 13 weeks (around 9/12/2024) for Follow up, in person, medication therapy management. At 9:00 AM    SUBJECTIVE/OBJECTIVE:                          Jeanmarie Mckinley is a 48 year old male seen for a follow-up visit from 11/07/23.       Reason for visit: Routine Follow-Up, Hypertension.    Allergies/ADRs: Reviewed in chart  Past Medical History: Reviewed in chart  Tobacco: He reports that he has never  smoked. He has never used smokeless tobacco.  Alcohol: 1-3 beverages / week    Medication Adherence/Access:   Patient uses pill box(es).  Patient takes medications 2 time(s) per day.   Per patient, misses medication 0 times per week.   Breo Ellipta 200-25 mcg/act - uses three times per week as opposed to 1 puff daily- and finds effective at this frequency.    Diabetes   Ozempic 1 mg weekly- has been taking for about a year  Metformin ER 500mg 4 tablets daily - 2 in the AM and 2 in the PM  Has 4-5 BM per day and sometimes more depending on the day, states that movement can be urgent but not with every BM, patient emphasizes the increase of bowel movements are bothersome and would like to know if there is anything that can be done.   Current diabetes symptoms: none           Eye exam is up to date  Foot exam is up to date    Lab Results   Component Value Date    A1C 5.9 03/12/2024    A1C 5.9 11/07/2023    A1C 5.9 02/27/2023    A1C 5.8 08/10/2022    A1C 5.7 03/09/2022    A1C 7.4 01/28/2021    A1C 5.1 08/11/2020    A1C 6.1 07/15/2019    A1C 7.8 02/21/2019    A1C 5.6 06/25/2018       Hypertension   Lisinopril 40 mg daily  Patient reports no current medication side effects  Patient periodically self-monitors blood pressure. Patient reports last home reading was two weeks ago at 125/80 mmHg.        Hyperlipidemia   atorvastatin 40 mg daily  Patient reports no significant myalgias or other side effects       Asthma   Albuterol MDI - does not report any recent uses and says he rarely uses it at all.   Breo Ellipta 200-25 mcg/act as noted above.   Patient reports no current medication side effects.      Triggers include: exercise or sports and illness .  Patient reports the following symptoms: none.         Supplements:   Co-enzyme Q10 100 mg daily   Multivitamin 1 tablet daily   Turmeric 1 tablet daily   Vitamin D3 2000 international unit(s) daily   No reported issues at this time.     Today's Vitals: BP (!) 126/90  Patient  Declined Weight.   ----------------      I spent 25 minutes with this patient today. All changes were made via collaborative practice agreement with Krista Juares MD. A copy of the visit note was provided to the patient's provider(s).    A summary of these recommendations was declined by the patient.    Alison Faria PharmD  Medication Therapy Management Resident, [facility]  Pager: [phone]  Email: [email or web address]    Preceptor cosignature: Jeanmarie Mckinley was seen independently by Dr. Faria. I have reviewed the assessment and plan. Faith Montague, AdnersD, VITO, BCACP       Medication Therapy Recommendations  Type 2 diabetes mellitus with hyperglycemia, without long-term current use of insulin (H)    Current Medication: metFORMIN (GLUCOPHAGE XR) 500 MG 24 hr tablet   Rationale: Undesirable effect - Adverse medication event - Safety   Recommendation: Decrease Dose - metFORMIN 500 MG 24 hr tablet - 2 tablets daily   Status: Accepted per CPA

## 2024-09-12 ENCOUNTER — LAB (OUTPATIENT)
Dept: LAB | Facility: CLINIC | Age: 49
End: 2024-09-12
Payer: COMMERCIAL

## 2024-09-12 ENCOUNTER — OFFICE VISIT (OUTPATIENT)
Dept: PHARMACY | Facility: CLINIC | Age: 49
End: 2024-09-12
Payer: COMMERCIAL

## 2024-09-12 ENCOUNTER — ALLIED HEALTH/NURSE VISIT (OUTPATIENT)
Dept: FAMILY MEDICINE | Facility: CLINIC | Age: 49
End: 2024-09-12
Payer: COMMERCIAL

## 2024-09-12 VITALS — WEIGHT: 235.7 LBS | BODY MASS INDEX: 33.82 KG/M2 | DIASTOLIC BLOOD PRESSURE: 81 MMHG | SYSTOLIC BLOOD PRESSURE: 128 MMHG

## 2024-09-12 DIAGNOSIS — Z23 NEED FOR PROPHYLACTIC VACCINATION AND INOCULATION AGAINST INFLUENZA: Primary | ICD-10-CM

## 2024-09-12 DIAGNOSIS — I10 BENIGN ESSENTIAL HYPERTENSION: ICD-10-CM

## 2024-09-12 DIAGNOSIS — J45.998 ASTHMA, PERSISTENT CONTROLLED: ICD-10-CM

## 2024-09-12 DIAGNOSIS — Z78.9 TAKES DIETARY SUPPLEMENTS: ICD-10-CM

## 2024-09-12 DIAGNOSIS — E11.65 TYPE 2 DIABETES MELLITUS WITH HYPERGLYCEMIA, WITHOUT LONG-TERM CURRENT USE OF INSULIN (H): ICD-10-CM

## 2024-09-12 DIAGNOSIS — E11.9 CONTROLLED TYPE 2 DIABETES MELLITUS WITHOUT COMPLICATION, WITHOUT LONG-TERM CURRENT USE OF INSULIN (H): Primary | ICD-10-CM

## 2024-09-12 DIAGNOSIS — Z23 NEED FOR PROPHYLACTIC VACCINATION AND INOCULATION AGAINST INFLUENZA: ICD-10-CM

## 2024-09-12 DIAGNOSIS — E78.2 MIXED HYPERLIPIDEMIA: ICD-10-CM

## 2024-09-12 LAB
HBA1C MFR BLD: 5.9 % (ref 0–5.6)
HOLD SPECIMEN: NORMAL
HOLD SPECIMEN: NORMAL

## 2024-09-12 PROCEDURE — 99207 PR NO CHARGE NURSE ONLY: CPT

## 2024-09-12 PROCEDURE — 90471 IMMUNIZATION ADMIN: CPT

## 2024-09-12 PROCEDURE — 99606 MTMS BY PHARM EST 15 MIN: CPT | Performed by: PHARMACIST

## 2024-09-12 PROCEDURE — 83036 HEMOGLOBIN GLYCOSYLATED A1C: CPT

## 2024-09-12 PROCEDURE — 90656 IIV3 VACC NO PRSV 0.5 ML IM: CPT

## 2024-09-12 PROCEDURE — 36415 COLL VENOUS BLD VENIPUNCTURE: CPT

## 2024-09-12 RX ORDER — METFORMIN HCL 500 MG
500 TABLET, EXTENDED RELEASE 24 HR ORAL 2 TIMES DAILY WITH MEALS
Qty: 180 TABLET | Refills: 3 | Status: SHIPPED | OUTPATIENT
Start: 2024-09-12

## 2024-09-12 NOTE — PATIENT INSTRUCTIONS
"Recommendations from today's MTM visit:                                                        Flu shot today  Your A1c looks great (still 5.9%) - no need to make any changes. Please continue Ozempic and metformin as you have been.   You'll need an updated COVID shot, we don't have them yet, but any pharmacy does. We will have them at [place] starting late next week.   Follow-up with Dr. Juares about your urination concerns.     Follow-up: 6 months, sooner if needed.     It was great speaking with you today.  I value your experience and would be very thankful for your time in providing feedback in our clinic survey. In the next few days, you may receive an email or text message from [facility] with a link to a survey related to your  clinical pharmacist.\"     To schedule another MTM appointment, please call the clinic directly or you may call the MTM scheduling line at [phone] or toll-free at [phone].     My Clinical Pharmacist's contact information:                                                      Please feel free to contact me with any questions or concerns you have.      Faith Montague, Pharm.D., M.B.A., [facility]  MTM Pharmacist, [facility]  Pager: [phone]  Email: [email or web address]      "

## 2024-09-12 NOTE — PROGRESS NOTES
Medication Therapy Management (MTM) Encounter    ASSESSMENT:                            Medication Adherence/Access: No issues identified    Diabetes   A1c rechecked today and continues to be below goal <7%. No changes recommended.   Regarding urination - this is unlikely due to hyperglycemia and he is not on medications that would increase urination, needs eval by provider.   UACR is at goal (on ACE). Apppropriately not taking aspirin (age <50). Up-to-date on eye and foot exams. Needs updated immunizations.     Hypertension   BP at goal in clinic. Adjust home monitoring technique and continue to monitor.     Hyperlipidemia   LDL at goal <70 in March, trigs and HDL also at goal. No changes needed.     Asthma   Stable     Supplements   Stable    PLAN:                            Flu shot today  Your A1c looks great (still 5.9%) - no need to make any changes. Please continue Ozempic and metformin as you have been.   You'll need an updated COVID shot, we don't have them yet, but any pharmacy does. We will have them at [facility] starting late next week.   Follow-up with Dr. Juares about your urination concerns.     Follow-up: 6 months, sooner if needed.     SUBJECTIVE/OBJECTIVE:                          Jeanmarie Mckinley is a 49 year old male seen for a follow-up visit.       Reason for visit: follow-up med review.    Allergies/ADRs: Reviewed in chart  Past Medical History: Reviewed in chart  Tobacco: He reports that he has never smoked. He has never used smokeless tobacco.  Alcohol: 1-3 beverages / week      Medication Adherence/Access: no issues reported.  -----------------------------------------------------------------------------------------------------------------  Diabetes   Ozempic 1 mg weekly  Metformin ER 500mg twice daily (reduced at last visit)  Patient reports improvement in side effects. Bowels are much improved since reducing metformin and also making some diet changes (reducing coffee intake). Still having  trouble with urination. Frequency/middle of the night urination is not as bad, but has times where he feels like his stream starts and stops. .  Blood sugar monitoring: a few times per week. His blood sugars are higher in the morning (sometimes in the 200s) but then go down once he's up and has taken his meds. Does not have any hypoglycemia symptoms (and has not had any hypoglycemic readings.    Diet/Exercise: Unchanged from      Eye exam is up to date  Foot exam is up to date    Hypertension   Lisinopril 40 mg daily  Patient reports no current medication side effects  Patient periodically self-monitors blood pressure. Patient reports last home readings have been up a bit in the 150's. Reports not always good about resting prior to taking BP. Wondering if his cuff is correct.      Hyperlipidemia   Atorvastatin 40 mg daily  Patient reports no significant myalgias or other side effects     Asthma   Albuterol MDI - does not report any recent uses and says he rarely uses it at all.   Breo Ellipta 200-25 1 puff daily  Patient reports no current medication side effects.      Triggers include: exercise or sports and illness .  Patient reports the following symptoms: none.     Supplements   Co-enzyme Q10 100 mg daily   Multivitamin 1 tablet daily   Turmeric 1 tablet daily   Vitamin D3 2000 international unit(s) daily   No reported issues at this time.        Today's Vitals: /81   Wt 235 lb 11.2 oz (106.9 kg)   BMI 33.82 kg/m    ----------------  I spent 15 minutes with this patient today. All changes were made via collaborative practice agreement with Krista Juares MD. A copy of the visit note was provided to the patient's provider(s).    A summary of these recommendations was sent via [employer].    Faith Montague, AndersD, VITO, BCACP  MTM Pharmacist, [facility]          Medication Therapy Recommendations  Controlled type 2 diabetes mellitus without complication, without long-term current use of  insulin (H)    Rationale: Preventive therapy - Needs additional medication therapy - Indication   Recommendation: Order Vaccine - INFLUENZA VIRUS VACCINE SPLIT   Status: Accepted per CPA

## 2024-09-21 DIAGNOSIS — I10 HYPERTENSION GOAL BP (BLOOD PRESSURE) < 130/80: ICD-10-CM

## 2024-09-21 DIAGNOSIS — E11.9 CONTROLLED TYPE 2 DIABETES MELLITUS WITHOUT COMPLICATION, WITHOUT LONG-TERM CURRENT USE OF INSULIN (H): ICD-10-CM

## 2024-09-23 RX ORDER — LISINOPRIL 40 MG/1
TABLET ORAL
Qty: 90 TABLET | Refills: 1 | OUTPATIENT
Start: 2024-09-23

## 2024-10-19 SDOH — HEALTH STABILITY: PHYSICAL HEALTH: ON AVERAGE, HOW MANY DAYS PER WEEK DO YOU ENGAGE IN MODERATE TO STRENUOUS EXERCISE (LIKE A BRISK WALK)?: 2 DAYS

## 2024-10-19 SDOH — HEALTH STABILITY: PHYSICAL HEALTH: ON AVERAGE, HOW MANY MINUTES DO YOU ENGAGE IN EXERCISE AT THIS LEVEL?: 40 MIN

## 2024-10-19 ASSESSMENT — SOCIAL DETERMINANTS OF HEALTH (SDOH): HOW OFTEN DO YOU GET TOGETHER WITH FRIENDS OR RELATIVES?: MORE THAN THREE TIMES A WEEK

## 2024-10-24 ENCOUNTER — OFFICE VISIT (OUTPATIENT)
Dept: FAMILY MEDICINE | Facility: CLINIC | Age: 49
End: 2024-10-24
Payer: COMMERCIAL

## 2024-10-24 VITALS
BODY MASS INDEX: 32.74 KG/M2 | TEMPERATURE: 97.1 F | HEIGHT: 70 IN | WEIGHT: 228.7 LBS | HEART RATE: 81 BPM | OXYGEN SATURATION: 98 % | RESPIRATION RATE: 16 BRPM | DIASTOLIC BLOOD PRESSURE: 74 MMHG | SYSTOLIC BLOOD PRESSURE: 140 MMHG

## 2024-10-24 DIAGNOSIS — E11.9 CONTROLLED TYPE 2 DIABETES MELLITUS WITHOUT COMPLICATION, WITHOUT LONG-TERM CURRENT USE OF INSULIN (H): ICD-10-CM

## 2024-10-24 DIAGNOSIS — I10 HYPERTENSION GOAL BP (BLOOD PRESSURE) < 130/80: Primary | ICD-10-CM

## 2024-10-24 DIAGNOSIS — R39.198 DIFFICULTY IN URINATION: ICD-10-CM

## 2024-10-24 LAB
ALBUMIN UR-MCNC: ABNORMAL MG/DL
APPEARANCE UR: CLEAR
BACTERIA #/AREA URNS HPF: NORMAL /HPF
BILIRUB UR QL STRIP: NEGATIVE
COLOR UR AUTO: YELLOW
GLUCOSE UR STRIP-MCNC: 500 MG/DL
HGB UR QL STRIP: NEGATIVE
KETONES UR STRIP-MCNC: NEGATIVE MG/DL
LEUKOCYTE ESTERASE UR QL STRIP: NEGATIVE
NITRATE UR QL: NEGATIVE
PH UR STRIP: 5.5 [PH] (ref 5–7)
RBC #/AREA URNS AUTO: NORMAL /HPF
SP GR UR STRIP: 1.01 (ref 1–1.03)
UROBILINOGEN UR STRIP-ACNC: 0.2 E.U./DL
WBC #/AREA URNS AUTO: NORMAL /HPF

## 2024-10-24 PROCEDURE — 90480 ADMN SARSCOV2 VAC 1/ONLY CMP: CPT | Performed by: FAMILY MEDICINE

## 2024-10-24 PROCEDURE — 81001 URINALYSIS AUTO W/SCOPE: CPT | Performed by: FAMILY MEDICINE

## 2024-10-24 PROCEDURE — 99213 OFFICE O/P EST LOW 20 MIN: CPT | Performed by: FAMILY MEDICINE

## 2024-10-24 PROCEDURE — 91320 SARSCV2 VAC 30MCG TRS-SUC IM: CPT | Performed by: FAMILY MEDICINE

## 2024-10-24 RX ORDER — SEMAGLUTIDE 1.34 MG/ML
2 INJECTION, SOLUTION SUBCUTANEOUS
Qty: 18 ML | Refills: 1 | Status: SHIPPED | OUTPATIENT
Start: 2024-10-24 | End: 2024-10-25

## 2024-10-24 RX ORDER — TRIAMTERENE AND HYDROCHLOROTHIAZIDE 37.5; 25 MG/1; MG/1
1 TABLET ORAL DAILY
Qty: 30 TABLET | Refills: 3 | Status: SHIPPED | OUTPATIENT
Start: 2024-10-24

## 2024-10-24 ASSESSMENT — ASTHMA QUESTIONNAIRES
QUESTION_5 LAST FOUR WEEKS HOW WOULD YOU RATE YOUR ASTHMA CONTROL: WELL CONTROLLED
QUESTION_4 LAST FOUR WEEKS HOW OFTEN HAVE YOU USED YOUR RESCUE INHALER OR NEBULIZER MEDICATION (SUCH AS ALBUTEROL): ONCE A WEEK OR LESS
ACT_TOTALSCORE: 22
QUESTION_2 LAST FOUR WEEKS HOW OFTEN HAVE YOU HAD SHORTNESS OF BREATH: ONCE OR TWICE A WEEK
QUESTION_1 LAST FOUR WEEKS HOW MUCH OF THE TIME DID YOUR ASTHMA KEEP YOU FROM GETTING AS MUCH DONE AT WORK, SCHOOL OR AT HOME: NONE OF THE TIME
QUESTION_3 LAST FOUR WEEKS HOW OFTEN DID YOUR ASTHMA SYMPTOMS (WHEEZING, COUGHING, SHORTNESS OF BREATH, CHEST TIGHTNESS OR PAIN) WAKE YOU UP AT NIGHT OR EARLIER THAN USUAL IN THE MORNING: NOT AT ALL
ACT_TOTALSCORE: 22

## 2024-10-24 ASSESSMENT — PAIN SCALES - GENERAL: PAINLEVEL_OUTOF10: MILD PAIN (3)

## 2024-10-24 NOTE — PATIENT INSTRUCTIONS
Patient Education   Preventive Care Advice   This is general advice given by our system to help you stay healthy. However, your care team may have specific advice just for you. Please talk to your care team about your preventive care needs.  Nutrition  Eat 5 or more servings of fruits and vegetables each day.  Try wheat bread, brown rice and whole grain pasta (instead of white bread, rice, and pasta).  Get enough calcium and vitamin D. Check the label on foods and aim for 100% of the RDA (recommended daily allowance).  Lifestyle  Exercise at least 150 minutes each week  (30 minutes a day, 5 days a week).  Do muscle strengthening activities 2 days a week. These help control your weight and prevent disease.  No smoking.  Wear sunscreen to prevent skin cancer.  Have a dental exam and cleaning every 6 months.  Yearly exams  See your health care team every year to talk about:  Any changes in your health.  Any medicines your care team has prescribed.  Preventive care, family planning, and ways to prevent chronic diseases.  Shots (vaccines)   HPV shots (up to age 26), if you've never had them before.  Hepatitis B shots (up to age 59), if you've never had them before.  COVID-19 shot: Get this shot when it's due.  Flu shot: Get a flu shot every year.  Tetanus shot: Get a tetanus shot every 10 years.  Pneumococcal, hepatitis A, and RSV shots: Ask your care team if you need these based on your risk.  Shingles shot (for age 50 and up)  General health tests  Diabetes screening:  Starting at age 35, Get screened for diabetes at least every 3 years.  If you are younger than age 35, ask your care team if you should be screened for diabetes.  Cholesterol test: At age 39, start having a cholesterol test every 5 years, or more often if advised.  Bone density scan (DEXA): At age 50, ask your care team if you should have this scan for osteoporosis (brittle bones).  Hepatitis C: Get tested at least once in your life.  STIs (sexually  transmitted infections)  Before age 24: Ask your care team if you should be screened for STIs.  After age 24: Get screened for STIs if you're at risk. You are at risk for STIs (including HIV) if:  You are sexually active with more than one person.  You don't use condoms every time.  You or a partner was diagnosed with a sexually transmitted infection.  If you are at risk for HIV, ask about PrEP medicine to prevent HIV.  Get tested for HIV at least once in your life, whether you are at risk for HIV or not.  Cancer screening tests  Cervical cancer screening: If you have a cervix, begin getting regular cervical cancer screening tests starting at age 21.  Breast cancer scan (mammogram): If you've ever had breasts, begin having regular mammograms starting at age 40. This is a scan to check for breast cancer.  Colon cancer screening: It is important to start screening for colon cancer at age 45.  Have a colonoscopy test every 10 years (or more often if you're at risk) Or, ask your provider about stool tests like a FIT test every year or Cologuard test every 3 years.  To learn more about your testing options, visit:   .  For help making a decision, visit:   https://bit.ly/kw52634.  Prostate cancer screening test: If you have a prostate, ask your care team if a prostate cancer screening test (PSA) at age 55 is right for you.  Lung cancer screening: If you are a current or former smoker ages 50 to 80, ask your care team if ongoing lung cancer screenings are right for you.  For informational purposes only. Not to replace the advice of your health care provider. Copyright   2023 [facility]. All rights reserved. Clinically reviewed by the [facility] Transitions Program. [employer] 800044 - REV 01/24.  Substance Use Disorder: Care Instructions  Overview     You can improve your life and health by stopping your use of alcohol or drugs. When you don't drink or use drugs, you may feel and sleep better. You may  get along better with your family, friends, and coworkers. There are medicines and programs that can help with substance use disorder.  How can you care for yourself at home?  Here are some ways to help you stay sober and prevent relapse.  If you have been given medicine to help keep you sober or reduce your cravings, be sure to take it exactly as prescribed.  Talk to your doctor about programs that can help you stop using drugs or drinking alcohol.  Do not keep alcohol or drugs in your home.  Plan ahead. Think about what you'll say if other people ask you to drink or use drugs. Try not to spend time with people who drink or use drugs.  Use the time and money spent on drinking or drugs to do something that's important to you.  Preventing a relapse  Have a plan to deal with relapse. Learn to recognize changes in your thinking that lead you to drink or use drugs. Get help before you start to drink or use drugs again.  Try to stay away from situations, friends, or places that may lead you to drink or use drugs.  If you feel the need to drink alcohol or use drugs again, seek help right away. Call a trusted friend or family member. Some people get support from organizations such as Narcotics Anonymous or [employer] or from treatment facilities.  If you relapse, get help as soon as you can. Some people make a plan with another person that outlines what they want that person to do for them if they relapse. The plan usually includes how to handle the relapse and who to notify in case of relapse.  Don't give up. Remember that a relapse doesn't mean that you have failed. Use the experience to learn the triggers that lead you to drink or use drugs. Then quit again. Recovery is a lifelong process. Many people have several relapses before they are able to quit for good.  Follow-up care is a key part of your treatment and safety. Be sure to make and go to all appointments, and call your doctor if you are having problems. It's  "also a good idea to know your test results and keep a list of the medicines you take.  When should you call for help?   Call 911  anytime you think you may need emergency care. For example, call if you or someone else:    Has overdosed or has withdrawal signs. Be sure to tell the emergency workers that you are or someone else is using or trying to quit using drugs. Overdose or withdrawal signs may include:  Losing consciousness.  Seizure.  Seeing or hearing things that aren't there (hallucinations).     Is thinking or talking about suicide or harming others.   Where to get help 24 hours a day, 7 days a week   If you or someone you know talks about suicide, self-harm, a mental health crisis, a substance use crisis, or any other kind of emotional distress, get help right away. You can:    Call the Suicide and Crisis Lifeline at 988.     Call 6-541-374-TALK (1-322.402.6739).     Text HOME to 855562 to access the Crisis Text Line.   Consider saving these numbers in your phone.  Go to [employer] for more information or to chat online.  Call your doctor now or seek immediate medical care if:    You are having withdrawal symptoms. These may include nausea or vomiting, sweating, shakiness, and anxiety.   Watch closely for changes in your health, and be sure to contact your doctor if:    You have a relapse.     You need more help or support to stop.   Where can you learn more?  Go to https://www.[employer].net/patiented  Enter H573 in the search box to learn more about \"Substance Use Disorder: Care Instructions.\"  Current as of: November 15, 2023  Content Version: 14.2 2024 [facility] [employer].   Care instructions adapted under license by your healthcare professional. If you have questions about a medical condition or this instruction, always ask your healthcare professional. [employer] disclaims any warranty or liability for your use of this information.       "

## 2024-10-24 NOTE — PROGRESS NOTES
Preventive Care Visit  [facility]  Krista Juares MD, Family Medicine  Oct 24, 2024  {Provider  Link to SmartSet :910719}    {PROVIDER CHARTING PREFERENCE:802253}    Eve Murry is a 49 year old, presenting for the following:  Physical and Urinary Problem        10/24/2024     1:03 PM   Additional Questions   Roomed by Ailin BARRERA MA apprentice   Accompanied by n/a          HPI  Pt states he is having issues with urinating, specifically at night when needing to urinate it does not always come out. Pt talked to pharmacist and was told it may be dt enlarged prostate.         Health Care Directive  Patient does not have a Health Care Directive: Discussed advance care planning with patient; information given to patient to review.      10/19/2024   General Health   How would you rate your overall physical health? Good   Feel stress (tense, anxious, or unable to sleep) To some extent      (!) STRESS CONCERN      10/19/2024   Nutrition   Three or more servings of calcium each day? Yes   Diet: Regular (no restrictions)   How many servings of fruit and vegetables per day? (!) 2-3   How many sweetened beverages each day? 0-1            10/19/2024   Exercise   Days per week of moderate/strenous exercise 2 days   Average minutes spent exercising at this level 40 min      (!) EXERCISE CONCERN      10/19/2024   Social Factors   Frequency of gathering with friends or relatives More than three times a week   Worry food won't last until get money to buy more No   Food not last or not have enough money for food? No   Do you have housing? (Housing is defined as stable permanent housing and does not include staying ouside in a car, in a tent, in an abandoned building, in an overnight shelter, or couch-surfing.) Yes   Are you worried about losing your housing? No   Lack of transportation? No   Unable to get utilities (heat,electricity)? No            10/19/2024   Dental   Dentist two times every year?  "Yes            10/19/2024   TB Screening   Were you born outside of the [place]? No          {Rooming Staff Patient needs a PHQ as part of the AWV.  Use this link to complete and then refresh the note to pull results Link to PHQ2 Assessment :424257}  {USE TO PULL IN PHQ RESULTS FOR TODAY:003095}      10/19/2024   Substance Use   Alcohol more than 3/day or more than 7/wk No   Do you use any other substances recreationally? (!) CANNABIS PRODUCTS        Social History     Tobacco Use    Smoking status: Never    Smokeless tobacco: Never   Vaping Use    Vaping status: Never Used   Substance Use Topics    Alcohol use: Yes     Comment: 1 drink per day    Drug use: No     {Provider  If there are gaps in the social history shown above, please follow the link to update and then refresh the note Link to Social and Substance History :203137}      10/19/2024   STI Screening   New sexual partner(s) since last STI/HIV test? No      ASCVD Risk   The ASCVD Risk score (Tj PERSON, et al., 2019) failed to calculate for the following reasons:    The valid total cholesterol range is 130 to 320 mg/dL        10/19/2024   Contraception/Family Planning   Questions about contraception or family planning No        {Provider  REQUIRED FOR AWV Use the storyboard to review patient history, after sections have been marked as reviewed, refresh note to capture documentation:311577}   Reviewed and updated as needed this visit by Provider                    {HISTORY OPTIONS (Optional):348785}    {ROS Picklists (Optional):867399}     Objective    Exam  There were no vitals taken for this visit.   Estimated body mass index is 33.82 kg/m  as calculated from the following:    Height as of 3/12/24: 1.778 m (5' 10\").    Weight as of 9/12/24: 106.9 kg (235 lb 11.2 oz).    Physical Exam  {Exam Choices (Optional):611996}        Signed Electronically by: Krista Juares MD  {Email feedback regarding this note to " [email or web address]   :829881}

## 2024-10-24 NOTE — PROGRESS NOTES
"  Assessment & Plan     1. Hypertension goal BP (blood pressure) < 130/80 (Primary)  Plan: unocntrolled hypertension  Continue lisinopril 40 mg once daily  Add - triamterene-HCTZ (MAXZIDE-25) 37.5-25 MG tablet; Take 1 tablet by mouth daily.  Dispense: 30 tablet; Refill: 3  Blood pressure monitor at home and send [employer] message about Blood pressure in 4 weeks and get lab only appointment as well   - Basic metabolic panel  (Ca, Cl, CO2, Creat, Gluc, K, Na, BUN); Future    2. Controlled type 2 diabetes mellitus without complication, without long-term current use of insulin (H)  Diabetes  well controlled  Increase semaglutide as tolerated to benefit both Diabetes  and weight reduction    - Semaglutide, 1 MG/DOSE, (OZEMPIC, 1 MG/DOSE,) 4 MG/3ML pen; Inject 2 mg subcutaneously every 7 days. INJECT 1MG SUBCUTANEOUSLY  EVERY 7 DAYS  Dispense: 18 mL; Refill: 1    3. Difficulty in urination  UMAC- clear for UTI  Possibly mild early BPH  Avoid caffine  If worsening symptoms/nocturia / hesitancy/incontinence- then will consider medications       Ordering of each unique test  Prescription drug management  I spent a total of 20 minutes on the day of the visit.   Time spent by me doing chart review, history and exam, documentation and further activities per the note        BMI  Estimated body mass index is 32.45 kg/m  as calculated from the following:    Height as of this encounter: 1.788 m (5' 10.39\").    Weight as of this encounter: 103.7 kg (228 lb 11.2 oz).       Eve Murry is a 49 year old, presenting for the following health issues:  Urinary Problem, Diabetes (Check in/), and Hypertension        10/24/2024     1:03 PM   Additional Questions   Roomed by Ailin BARRERA MA apprentice   Accompanied by n/a     HPI   Pt states he is having issues with urinating, specifically at night when needing to urinate it does not always come out. Pt talked to pharmacist and was told it may be dt enlarged prostate.     Diabetes " "Follow-up    BP Readings from Last 2 Encounters:   10/24/24 (!) 140/74   09/12/24 128/81     Hemoglobin A1C (%)   Date Value   09/12/2024 5.9 (H)   03/12/2024 5.9 (H)   01/28/2021 7.4 (H)   08/11/2020 5.1     LDL Cholesterol Calculated (mg/dL)   Date Value   03/12/2024 56   02/27/2023 85   08/11/2020 70   07/15/2019 65     Wt Readings from Last 5 Encounters:   10/24/24 103.7 kg (228 lb 11.2 oz)   09/12/24 106.9 kg (235 lb 11.2 oz)   03/12/24 101.6 kg (224 lb)   06/26/23 106.1 kg (234 lb)   06/21/23 107.5 kg (237 lb)              Hypertension Follow-up    Do you check your blood pressure regularly outside of the clinic? Yes   Are you following a low salt diet? Yes  Are your blood pressures ever more than 140 on the top number (systolic) OR more   than 90 on the bottom number (diastolic), for example 140/90? Yes        Review of Systems  Constitutional, neuro, ENT, endocrine, pulmonary, cardiac, gastrointestinal, genitourinary, musculoskeletal, integument and psychiatric systems are negative, except as otherwise noted.      Objective    BP (!) 140/74 (BP Location: Left arm, Patient Position: Sitting, Cuff Size: Adult Large)   Pulse 81   Temp 97.1  F (36.2  C) (Temporal)   Resp 16   Ht 1.788 m (5' 10.39\")   Wt 103.7 kg (228 lb 11.2 oz)   SpO2 98%   BMI 32.45 kg/m    Body mass index is 32.45 kg/m .  Physical Exam   GENERAL: alert and no distress  NECK: no adenopathy, no asymmetry, masses, or scars  RESP: lungs clear to auscultation - no rales, rhonchi or wheezes  CV: regular rate and rhythm, normal S1 S2, no S3 or S4, no murmur, click or rub, no peripheral edema  ABDOMEN: soft, nontender, no hepatosplenomegaly, no masses and bowel sounds normal  MS: no gross musculoskeletal defects noted, no edema            Signed Electronically by: Krista Juares MD    "

## 2024-10-25 ENCOUNTER — TELEPHONE (OUTPATIENT)
Dept: FAMILY MEDICINE | Facility: CLINIC | Age: 49
End: 2024-10-25
Payer: COMMERCIAL

## 2024-10-25 DIAGNOSIS — E11.9 CONTROLLED TYPE 2 DIABETES MELLITUS WITHOUT COMPLICATION, WITHOUT LONG-TERM CURRENT USE OF INSULIN (H): ICD-10-CM

## 2024-10-25 RX ORDER — SEMAGLUTIDE 1.34 MG/ML
2 INJECTION, SOLUTION SUBCUTANEOUS
Qty: 18 ML | Refills: 1 | Status: SHIPPED | OUTPATIENT
Start: 2024-10-25 | End: 2024-10-28

## 2024-10-25 NOTE — TELEPHONE ENCOUNTER
Medication passed protocol, however, refill RN could not approve because provider needs to review pharmacy/patient note. Provider, please approve or deny the prescription.     Spoke to patient

## 2024-10-25 NOTE — TELEPHONE ENCOUNTER
Dr. Juares  Pharmacy calling for clarification on Ozempic 1mg/dose   Should sig be:   INJECT 2 MG SUBCUTANEOUSLY EVERY 7 DAYS.   Or   INJECT 1MG SUBCUTANEOUSLY EVERY 7 DAYS     Verbal approval okay or send new rx  Ref number: [id number]    Thank you  Amanda BARRERA RN

## 2024-10-28 DIAGNOSIS — E11.9 CONTROLLED TYPE 2 DIABETES MELLITUS WITHOUT COMPLICATION, WITHOUT LONG-TERM CURRENT USE OF INSULIN (H): ICD-10-CM

## 2024-10-28 RX ORDER — SEMAGLUTIDE 1.34 MG/ML
2 INJECTION, SOLUTION SUBCUTANEOUS
Qty: 18 ML | Refills: 1 | Status: SHIPPED | OUTPATIENT
Start: 2024-10-28 | End: 2024-10-29

## 2024-10-28 NOTE — TELEPHONE ENCOUNTER
Current Rx has two different instructions:   INJECT 2 MG SUBCUTANEOUSLY EVERY 7 DAYS. INJECT 1MG SUBCUTANEOUSLY EVERY 7 DAYS     Pended correct instructions for pharmacy    Thanks,  Alcira MCCABE RN

## 2024-10-28 NOTE — TELEPHONE ENCOUNTER
A.S.,  To clarify:   RN to provide verbal order for Ozempic 2mg weekly dose?  Thanks,  Alcira MCCABE RN

## 2024-10-28 NOTE — TELEPHONE ENCOUNTER
Patient knows from recent clinic encounter    Yes- you can clarify further- I am unsure- why this back and forth due to pharamcy    Thanks

## 2024-11-10 ENCOUNTER — MYC REFILL (OUTPATIENT)
Dept: FAMILY MEDICINE | Facility: CLINIC | Age: 49
End: 2024-11-10
Payer: COMMERCIAL

## 2024-11-10 DIAGNOSIS — E11.9 CONTROLLED TYPE 2 DIABETES MELLITUS WITHOUT COMPLICATION, WITHOUT LONG-TERM CURRENT USE OF INSULIN (H): ICD-10-CM

## 2024-11-10 DIAGNOSIS — I10 HYPERTENSION GOAL BP (BLOOD PRESSURE) < 130/80: ICD-10-CM

## 2024-11-11 RX ORDER — LISINOPRIL 40 MG/1
TABLET ORAL
Qty: 90 TABLET | Refills: 1 | OUTPATIENT
Start: 2024-11-11

## 2024-11-13 RX ORDER — LISINOPRIL 40 MG/1
40 TABLET ORAL DAILY
Qty: 90 TABLET | Refills: 1 | Status: SHIPPED | OUTPATIENT
Start: 2024-11-13

## 2024-11-29 NOTE — TELEPHONE ENCOUNTER
Pending Prescriptions:                       Disp   Refills    VICTOZA PEN 18 MG/3ML soln [Pharmacy Med *       3            Sig: INJECT 1.2MG UNDER THE SKIN DAILY             Last Written Prescription Date: 06/12/2017  Last Fill Quantity: 6ml, # refills: 0  Last Office Visit with [facility], [facility] or [facility] prescribing provider:  05/24/2017        BP Readings from Last 3 Encounters:   05/24/17 128/76   03/23/17 118/66   10/12/16 116/69     Lab Results   Component Value Date    MICROL 13 03/23/2017     Lab Results   Component Value Date    UMALCR 13.30 03/23/2017     Creatinine   Date Value Ref Range Status   05/24/2017 1.05 0.66 - 1.25 mg/dL Final   ]  GFR Estimate   Date Value Ref Range Status   05/24/2017 78 >60 mL/min/1.7m2 Final     Comment:     Non  GFR Calc   12/26/2016 76 >60 mL/min/1.7m2 Final     Comment:     Non  GFR Calc   09/22/2016 84 >60 mL/min/1.7m2 Final     Comment:     Non  GFR Calc     GFR Estimate If Black   Date Value Ref Range Status   05/24/2017 >90   GFR Calc   >60 mL/min/1.7m2 Final   12/26/2016 >90   GFR Calc   >60 mL/min/1.7m2 Final   09/22/2016 >90   GFR Calc   >60 mL/min/1.7m2 Final     Lab Results   Component Value Date    CHOL 133 05/24/2017     Lab Results   Component Value Date    HDL 54 05/24/2017     Lab Results   Component Value Date    LDL 65 05/24/2017     Lab Results   Component Value Date    TRIG 72 05/24/2017     No results found for: CHOLHDLRATIO  No results found for: AST  No results found for: ALT  Lab Results   Component Value Date    A1C 5.2 05/24/2017    A1C 5.9 12/26/2016    A1C 8.1 09/22/2016    A1C 6.9 04/05/2016     Potassium   Date Value Ref Range Status   05/24/2017 4.1 3.4 - 5.3 mmol/L Final        Reason for call:   [x] Refill   [] Prior Auth  [] Other:     Office:   [x] PCP/Provider - CRIS Arevalo- [facility] primary care    [] Specialty/Provider -     Medication:     clopidogrel (PLAVIX) 75 mg tablet 75 mg, Daily           Pharmacy:   [id number] [address] [phone]       Does the patient have enough for 3 days?   [] Yes   [x] No - Send as HP to POD

## 2025-01-04 DIAGNOSIS — I10 HYPERTENSION GOAL BP (BLOOD PRESSURE) < 130/80: ICD-10-CM

## 2025-01-06 RX ORDER — TRIAMTERENE AND HYDROCHLOROTHIAZIDE 37.5; 25 MG/1; MG/1
1 TABLET ORAL DAILY
Qty: 90 TABLET | Refills: 0 | OUTPATIENT
Start: 2025-01-06

## 2025-02-10 ENCOUNTER — PATIENT OUTREACH (OUTPATIENT)
Dept: CARE COORDINATION | Facility: CLINIC | Age: 50
End: 2025-02-10
Payer: COMMERCIAL

## 2025-02-24 ENCOUNTER — PATIENT OUTREACH (OUTPATIENT)
Dept: CARE COORDINATION | Facility: CLINIC | Age: 50
End: 2025-02-24
Payer: COMMERCIAL

## 2025-03-26 DIAGNOSIS — E11.9 CONTROLLED TYPE 2 DIABETES MELLITUS WITHOUT COMPLICATION, WITHOUT LONG-TERM CURRENT USE OF INSULIN (H): Primary | ICD-10-CM

## 2025-03-26 DIAGNOSIS — E78.2 MIXED HYPERLIPIDEMIA: ICD-10-CM

## 2025-03-26 DIAGNOSIS — I10 BENIGN ESSENTIAL HYPERTENSION: ICD-10-CM

## 2025-03-26 NOTE — PROGRESS NOTES
Ordering labs prior to 3/27 MTM appointment per CPA with Dr. Juares.    Ann Mcdowell, Hans  Medication Therapy Management Pharmacy Resident  [facility]    Preceptor cosignature: Jeanmarie Mckinley was seen independently by Dr. Mcdowell. I have reviewed the assessment and plan. Faith Montague, AndersD, VITO, BCACP

## 2025-03-27 ENCOUNTER — OFFICE VISIT (OUTPATIENT)
Dept: PHARMACY | Facility: CLINIC | Age: 50
End: 2025-03-27
Payer: COMMERCIAL

## 2025-03-27 ENCOUNTER — LAB (OUTPATIENT)
Dept: LAB | Facility: CLINIC | Age: 50
End: 2025-03-27
Payer: COMMERCIAL

## 2025-03-27 VITALS — BODY MASS INDEX: 31.58 KG/M2 | SYSTOLIC BLOOD PRESSURE: 110 MMHG | DIASTOLIC BLOOD PRESSURE: 72 MMHG | WEIGHT: 222.6 LBS

## 2025-03-27 DIAGNOSIS — E78.2 MIXED HYPERLIPIDEMIA: ICD-10-CM

## 2025-03-27 DIAGNOSIS — I10 BENIGN ESSENTIAL HYPERTENSION: ICD-10-CM

## 2025-03-27 DIAGNOSIS — E11.9 CONTROLLED TYPE 2 DIABETES MELLITUS WITHOUT COMPLICATION, WITHOUT LONG-TERM CURRENT USE OF INSULIN (H): Primary | ICD-10-CM

## 2025-03-27 DIAGNOSIS — J45.998 ASTHMA, PERSISTENT CONTROLLED: ICD-10-CM

## 2025-03-27 DIAGNOSIS — Z78.9 TAKES DIETARY SUPPLEMENTS: ICD-10-CM

## 2025-03-27 DIAGNOSIS — L30.9 ECZEMA, UNSPECIFIED TYPE: ICD-10-CM

## 2025-03-27 DIAGNOSIS — E11.9 CONTROLLED TYPE 2 DIABETES MELLITUS WITHOUT COMPLICATION, WITHOUT LONG-TERM CURRENT USE OF INSULIN (H): ICD-10-CM

## 2025-03-27 LAB
ANION GAP SERPL CALCULATED.3IONS-SCNC: 12 MMOL/L (ref 7–15)
BUN SERPL-MCNC: 26.2 MG/DL (ref 6–20)
CALCIUM SERPL-MCNC: 10 MG/DL (ref 8.8–10.4)
CHLORIDE SERPL-SCNC: 98 MMOL/L (ref 98–107)
CHOLEST SERPL-MCNC: 141 MG/DL
CREAT SERPL-MCNC: 1.46 MG/DL (ref 0.67–1.17)
CREAT UR-MCNC: 106 MG/DL
EGFRCR SERPLBLD CKD-EPI 2021: 59 ML/MIN/1.73M2
EST. AVERAGE GLUCOSE BLD GHB EST-MCNC: 151 MG/DL
FASTING STATUS PATIENT QL REPORTED: YES
FASTING STATUS PATIENT QL REPORTED: YES
GLUCOSE SERPL-MCNC: 195 MG/DL (ref 70–99)
HBA1C MFR BLD: 6.9 % (ref 0–5.6)
HCO3 SERPL-SCNC: 25 MMOL/L (ref 22–29)
HDLC SERPL-MCNC: 44 MG/DL
LDLC SERPL CALC-MCNC: 72 MG/DL
MICROALBUMIN UR-MCNC: <12 MG/L
MICROALBUMIN/CREAT UR: NORMAL MG/G{CREAT}
NONHDLC SERPL-MCNC: 97 MG/DL
POTASSIUM SERPL-SCNC: 5 MMOL/L (ref 3.4–5.3)
SODIUM SERPL-SCNC: 135 MMOL/L (ref 135–145)
TRIGL SERPL-MCNC: 127 MG/DL

## 2025-03-27 PROCEDURE — 99605 MTMS BY PHARM NP 15 MIN: CPT

## 2025-03-27 RX ORDER — METFORMIN HYDROCHLORIDE 500 MG/1
TABLET, EXTENDED RELEASE ORAL
Qty: 270 TABLET | Refills: 1 | Status: SHIPPED | OUTPATIENT
Start: 2025-03-27

## 2025-03-27 NOTE — PROGRESS NOTES
Medication Therapy Management (MTM) Encounter    ASSESSMENT:                            Medication Adherence/Access: No issues identified.    Diabetes   A1c still at goal <7%, however, increased quite a bit from previous checks. SMBG readings at fasting goal  mg/dL in the evening, but not in the mornings. SMBG readings at post-prandial goal of <180 mg/dL. Since patient is still meeting A1c goal we may not need to change anything, but with morning fasting readings not at goal could consider increasing metformin dose again.  UACR at goal <30 mg/g, eye exam UTD, due for foot exam.     Hypertension   Blood pressure at goal <130/80 mmHg today. BMP showed increased SCr compared to last check, would recommend rechecking and ensuring patient is well hydrated.     Hyperlipidemia   LDL slightly above goal <70 mg/dL, would not recommend any changes to therapy at this time.     Asthma   Would recommend patient continue to use Breo every day as prescribed, however, symptoms seem stable with current regimen.     Supplements   Stable.     Eczema:   Stable.    PLAN:                            Increase metformin to 500 mg every morning and 1000 mg every evening.     Follow-up: MyChart when I get your new BMP back and/or in ~2 weeks to check on metformin increase    SUBJECTIVE/OBJECTIVE:                          Jeanmarie Mckinley is a 49 year old male coming in for a follow-up visit.       Reason for visit: Diabetes follow up.    Allergies/ADRs: Reviewed in chart  Past Medical History: Reviewed in chart  Tobacco: He reports that he has never smoked. He has never used smokeless tobacco.  Alcohol: no change from previous visits  Caffeine: no change from previous visits  Medication Adherence/Access: no issues reported.    Diabetes   Ozempic 2 mg weekly  Metformin ER 500mg twice daily  Patient reports he is still having the same concerns with urination and bowel movements, has not noticed much of difference of symptoms either  improving or getting worse with changing doses of Ozempic or metformin.   Denies any episodes of hypoglycemia.     Blood sugar monitorin time(s) daily; Ranges: (patient reported) Fasting- 160-180 in the AM, 100s in the PM, Post-Prandial: 150 mg/dL,    Eye exam is up to date  Foot exam: due    Hypertension   Lisinopril 40 mg daily  Triamterene/hydrochlorothiazide 37.5/25 mg daily  Patient reports no current medication side effects  Patient periodically self-monitors blood pressure, but wonders if his cuff is correct.      Hyperlipidemia   Atorvastatin 40 mg daily  Patient reports no significant myalgias or other side effects     Asthma   Albuterol MDI - uses rarely  Breo Ellipta 200-25 1 puff daily - typically using about 3 times per week  Patient reports no current medication side effects.      Triggers include: exercise or sports and illness.  Patient reports the following symptoms: none, says he has not had any worsening of his breathing with not using the Breo every day.     Supplements   Co-enzyme Q10 100 mg daily   Multivitamin 1 tablet daily   Turmeric 1 tablet daily   Vitamin D3 2000 international unit(s) daily   Fish oil 1200 mg daily  No reported issues at this time.      Eczema:   Triamcinolone 0.1% cream twice daily as needed - uses a few times per year  Patient reports this works well, no concerns    Today's Vitals: /72   Wt 222 lb 9.6 oz (101 kg)   BMI 31.58 kg/m    ----------------      I spent 15 minutes with this patient today. All changes were made via collaborative practice agreement with Krista Juares MD. A copy of the visit note was provided to the patient's provider(s).    A summary of these recommendations was given to the patient.    Ann Mcdowell, AndersD  Medication Therapy Management Pharmacy Resident  [facility]    Preceptor cosignature: Jeanmarie Mckinley was seen independently by Dr. Mcdowell. I have reviewed the assessment and plan. Faith Montague,  PharmD, VITO, BCACP       Medication Therapy Recommendations  Controlled type 2 diabetes mellitus without complication, without long-term current use of insulin (H)   1 Current Medication: metFORMIN (GLUCOPHAGE XR) 500 MG 24 hr tablet (Discontinued)   Current Medication Sig: Take 1 tablet (500 mg) by mouth 2 times daily (with meals).   Rationale: Dose too low - Dosage too low - Effectiveness   Recommendation: Increase Dose - metFORMIN 500 MG 24 hr tablet   Status: Accepted per CPA   Identified Date: 3/27/2025 Completed Date: 3/27/2025

## 2025-03-31 DIAGNOSIS — E78.2 MIXED HYPERLIPIDEMIA: ICD-10-CM

## 2025-03-31 RX ORDER — ATORVASTATIN CALCIUM 40 MG/1
40 TABLET, FILM COATED ORAL DAILY
Qty: 90 TABLET | Refills: 1 | Status: SHIPPED | OUTPATIENT
Start: 2025-03-31

## 2025-04-05 SDOH — HEALTH STABILITY: PHYSICAL HEALTH: ON AVERAGE, HOW MANY DAYS PER WEEK DO YOU ENGAGE IN MODERATE TO STRENUOUS EXERCISE (LIKE A BRISK WALK)?: 3 DAYS

## 2025-04-05 SDOH — HEALTH STABILITY: PHYSICAL HEALTH: ON AVERAGE, HOW MANY MINUTES DO YOU ENGAGE IN EXERCISE AT THIS LEVEL?: 30 MIN

## 2025-04-05 ASSESSMENT — ANXIETY QUESTIONNAIRES
1. FEELING NERVOUS, ANXIOUS, OR ON EDGE: SEVERAL DAYS
7. FEELING AFRAID AS IF SOMETHING AWFUL MIGHT HAPPEN: NOT AT ALL
4. TROUBLE RELAXING: SEVERAL DAYS
5. BEING SO RESTLESS THAT IT IS HARD TO SIT STILL: NOT AT ALL
IF YOU CHECKED OFF ANY PROBLEMS ON THIS QUESTIONNAIRE, HOW DIFFICULT HAVE THESE PROBLEMS MADE IT FOR YOU TO DO YOUR WORK, TAKE CARE OF THINGS AT HOME, OR GET ALONG WITH OTHER PEOPLE: NOT DIFFICULT AT ALL
6. BECOMING EASILY ANNOYED OR IRRITABLE: SEVERAL DAYS
GAD7 TOTAL SCORE: 3
GAD7 TOTAL SCORE: 3
7. FEELING AFRAID AS IF SOMETHING AWFUL MIGHT HAPPEN: NOT AT ALL
3. WORRYING TOO MUCH ABOUT DIFFERENT THINGS: NOT AT ALL
8. IF YOU CHECKED OFF ANY PROBLEMS, HOW DIFFICULT HAVE THESE MADE IT FOR YOU TO DO YOUR WORK, TAKE CARE OF THINGS AT HOME, OR GET ALONG WITH OTHER PEOPLE?: NOT DIFFICULT AT ALL
2. NOT BEING ABLE TO STOP OR CONTROL WORRYING: NOT AT ALL
GAD7 TOTAL SCORE: 3

## 2025-04-05 ASSESSMENT — PATIENT HEALTH QUESTIONNAIRE - PHQ9
SUM OF ALL RESPONSES TO PHQ QUESTIONS 1-9: 2
10. IF YOU CHECKED OFF ANY PROBLEMS, HOW DIFFICULT HAVE THESE PROBLEMS MADE IT FOR YOU TO DO YOUR WORK, TAKE CARE OF THINGS AT HOME, OR GET ALONG WITH OTHER PEOPLE: NOT DIFFICULT AT ALL
SUM OF ALL RESPONSES TO PHQ QUESTIONS 1-9: 2

## 2025-04-05 ASSESSMENT — SOCIAL DETERMINANTS OF HEALTH (SDOH): HOW OFTEN DO YOU GET TOGETHER WITH FRIENDS OR RELATIVES?: MORE THAN THREE TIMES A WEEK

## 2025-04-08 ENCOUNTER — OFFICE VISIT (OUTPATIENT)
Dept: FAMILY MEDICINE | Facility: CLINIC | Age: 50
End: 2025-04-08
Attending: FAMILY MEDICINE
Payer: COMMERCIAL

## 2025-04-08 ENCOUNTER — ORDERS ONLY (AUTO-RELEASED) (OUTPATIENT)
Dept: FAMILY MEDICINE | Facility: CLINIC | Age: 50
End: 2025-04-08

## 2025-04-08 VITALS
SYSTOLIC BLOOD PRESSURE: 136 MMHG | HEART RATE: 75 BPM | OXYGEN SATURATION: 100 % | HEIGHT: 65 IN | BODY MASS INDEX: 38.32 KG/M2 | RESPIRATION RATE: 16 BRPM | TEMPERATURE: 97.8 F | WEIGHT: 230 LBS | DIASTOLIC BLOOD PRESSURE: 83 MMHG

## 2025-04-08 DIAGNOSIS — E11.9 CONTROLLED TYPE 2 DIABETES MELLITUS WITHOUT COMPLICATION, WITHOUT LONG-TERM CURRENT USE OF INSULIN (H): ICD-10-CM

## 2025-04-08 DIAGNOSIS — E78.5 HYPERLIPIDEMIA LDL GOAL <70: ICD-10-CM

## 2025-04-08 DIAGNOSIS — E66.812 CLASS 2 SEVERE OBESITY WITH BODY MASS INDEX (BMI) OF 35 TO 39.9 WITH SERIOUS COMORBIDITY (H): ICD-10-CM

## 2025-04-08 DIAGNOSIS — R00.2 PALPITATIONS: ICD-10-CM

## 2025-04-08 DIAGNOSIS — S76.019S STRAIN OF HIP AND THIGH, UNSPECIFIED LATERALITY, SEQUELA: ICD-10-CM

## 2025-04-08 DIAGNOSIS — J45.998 ASTHMA, PERSISTENT CONTROLLED: ICD-10-CM

## 2025-04-08 DIAGNOSIS — Z00.00 ROUTINE GENERAL MEDICAL EXAMINATION AT A HEALTH CARE FACILITY: Primary | ICD-10-CM

## 2025-04-08 DIAGNOSIS — S76.919S STRAIN OF HIP AND THIGH, UNSPECIFIED LATERALITY, SEQUELA: ICD-10-CM

## 2025-04-08 DIAGNOSIS — E66.01 CLASS 2 SEVERE OBESITY WITH BODY MASS INDEX (BMI) OF 35 TO 39.9 WITH SERIOUS COMORBIDITY (H): ICD-10-CM

## 2025-04-08 DIAGNOSIS — I10 HYPERTENSION GOAL BP (BLOOD PRESSURE) < 130/80: ICD-10-CM

## 2025-04-08 DIAGNOSIS — F41.9 ANXIETY: ICD-10-CM

## 2025-04-08 LAB
ANION GAP SERPL CALCULATED.3IONS-SCNC: 11 MMOL/L (ref 7–15)
BUN SERPL-MCNC: 14.3 MG/DL (ref 6–20)
CALCIUM SERPL-MCNC: 9.6 MG/DL (ref 8.8–10.4)
CHLORIDE SERPL-SCNC: 105 MMOL/L (ref 98–107)
CREAT SERPL-MCNC: 1.19 MG/DL (ref 0.67–1.17)
CYSTATIN C (ROCHE): 0.9 MG/L (ref 0.6–1)
EGFRCR SERPLBLD CKD-EPI 2021: 75 ML/MIN/1.73M2
GFR/BSA.PRED SERPLBLD CYS-BASED-ARV: >90 ML/MIN/1.73M2
GLUCOSE SERPL-MCNC: 177 MG/DL (ref 70–99)
HCO3 SERPL-SCNC: 25 MMOL/L (ref 22–29)
POTASSIUM SERPL-SCNC: 4.3 MMOL/L (ref 3.4–5.3)
SODIUM SERPL-SCNC: 141 MMOL/L (ref 135–145)

## 2025-04-08 PROCEDURE — 36415 COLL VENOUS BLD VENIPUNCTURE: CPT | Performed by: FAMILY MEDICINE

## 2025-04-08 PROCEDURE — 80048 BASIC METABOLIC PNL TOTAL CA: CPT | Performed by: FAMILY MEDICINE

## 2025-04-08 PROCEDURE — 99214 OFFICE O/P EST MOD 30 MIN: CPT | Mod: 25 | Performed by: FAMILY MEDICINE

## 2025-04-08 PROCEDURE — 99396 PREV VISIT EST AGE 40-64: CPT | Performed by: FAMILY MEDICINE

## 2025-04-08 PROCEDURE — 93000 ELECTROCARDIOGRAM COMPLETE: CPT | Performed by: FAMILY MEDICINE

## 2025-04-08 PROCEDURE — 82610 CYSTATIN C: CPT | Performed by: FAMILY MEDICINE

## 2025-04-08 RX ORDER — LISINOPRIL 40 MG/1
40 TABLET ORAL DAILY
Qty: 90 TABLET | Refills: 3 | Status: SHIPPED | OUTPATIENT
Start: 2025-04-08

## 2025-04-08 RX ORDER — ALBUTEROL SULFATE 90 UG/1
1-2 INHALANT RESPIRATORY (INHALATION) EVERY 6 HOURS PRN
Qty: 3 G | Refills: 3 | Status: SHIPPED | OUTPATIENT
Start: 2025-04-08

## 2025-04-08 RX ORDER — FLUTICASONE FUROATE AND VILANTEROL 200; 25 UG/1; UG/1
1 POWDER RESPIRATORY (INHALATION) DAILY
Qty: 1 EACH | Refills: 11 | Status: SHIPPED | OUTPATIENT
Start: 2025-04-08

## 2025-04-08 RX ORDER — TRIAMTERENE AND HYDROCHLOROTHIAZIDE 37.5; 25 MG/1; MG/1
1 TABLET ORAL DAILY
Qty: 90 TABLET | Refills: 3 | Status: CANCELLED | OUTPATIENT
Start: 2025-04-08

## 2025-04-08 RX ORDER — ATORVASTATIN CALCIUM 40 MG/1
40 TABLET, FILM COATED ORAL DAILY
Qty: 90 TABLET | Refills: 3 | Status: SHIPPED | OUTPATIENT
Start: 2025-04-08

## 2025-04-08 ASSESSMENT — ASTHMA QUESTIONNAIRES: ACT_TOTALSCORE: 25

## 2025-04-08 NOTE — PATIENT INSTRUCTIONS
Patient Education   Preventive Care Advice   This is general advice given by our system to help you stay healthy. However, your care team may have specific advice just for you. Please talk to your care team about your preventive care needs.  Nutrition  Eat 5 or more servings of fruits and vegetables each day.  Try wheat bread, brown rice and whole grain pasta (instead of white bread, rice, and pasta).  Get enough calcium and vitamin D. Check the label on foods and aim for 100% of the RDA (recommended daily allowance).  Lifestyle  Exercise at least 150 minutes each week  (30 minutes a day, 5 days a week).  Do muscle strengthening activities 2 days a week. These help control your weight and prevent disease.  No smoking.  Wear sunscreen to prevent skin cancer.  Have a dental exam and cleaning every 6 months.  Yearly exams  See your health care team every year to talk about:  Any changes in your health.  Any medicines your care team has prescribed.  Preventive care, family planning, and ways to prevent chronic diseases.  Shots (vaccines)   HPV shots (up to age 26), if you've never had them before.  Hepatitis B shots (up to age 59), if you've never had them before.  COVID-19 shot: Get this shot when it's due.  Flu shot: Get a flu shot every year.  Tetanus shot: Get a tetanus shot every 10 years.  Pneumococcal, hepatitis A, and RSV shots: Ask your care team if you need these based on your risk.  Shingles shot (for age 50 and up)  General health tests  Diabetes screening:  Starting at age 35, Get screened for diabetes at least every 3 years.  If you are younger than age 35, ask your care team if you should be screened for diabetes.  Cholesterol test: At age 39, start having a cholesterol test every 5 years, or more often if advised.  Bone density scan (DEXA): At age 50, ask your care team if you should have this scan for osteoporosis (brittle bones).  Hepatitis C: Get tested at least once in your life.  STIs (sexually  transmitted infections)  Before age 24: Ask your care team if you should be screened for STIs.  After age 24: Get screened for STIs if you're at risk. You are at risk for STIs (including HIV) if:  You are sexually active with more than one person.  You don't use condoms every time.  You or a partner was diagnosed with a sexually transmitted infection.  If you are at risk for HIV, ask about PrEP medicine to prevent HIV.  Get tested for HIV at least once in your life, whether you are at risk for HIV or not.  Cancer screening tests  Cervical cancer screening: If you have a cervix, begin getting regular cervical cancer screening tests starting at age 21.  Breast cancer scan (mammogram): If you've ever had breasts, begin having regular mammograms starting at age 40. This is a scan to check for breast cancer.  Colon cancer screening: It is important to start screening for colon cancer at age 45.  Have a colonoscopy test every 10 years (or more often if you're at risk) Or, ask your provider about stool tests like a FIT test every year or Cologuard test every 3 years.  To learn more about your testing options, visit:   .  For help making a decision, visit:   https://bit.ly/nq34439.  Prostate cancer screening test: If you have a prostate, ask your care team if a prostate cancer screening test (PSA) at age 55 is right for you.  Lung cancer screening: If you are a current or former smoker ages 50 to 80, ask your care team if ongoing lung cancer screenings are right for you.  For informational purposes only. Not to replace the advice of your health care provider. Copyright   2023 [facility]. All rights reserved. Clinically reviewed by the [facility]. [employer] 168461 - REV 01/24.  Substance Use Disorder: Care Instructions  Overview     You can improve your life and health by stopping your use of alcohol or drugs. When you don't drink or use drugs, you may feel and sleep better. You may  get along better with your family, friends, and coworkers. There are medicines and programs that can help with substance use disorder.  How can you care for yourself at home?  Here are some ways to help you stay sober and prevent relapse.  If you have been given medicine to help keep you sober or reduce your cravings, be sure to take it exactly as prescribed.  Talk to your doctor about programs that can help you stop using drugs or drinking alcohol.  Do not keep alcohol or drugs in your home.  Plan ahead. Think about what you'll say if other people ask you to drink or use drugs. Try not to spend time with people who drink or use drugs.  Use the time and money spent on drinking or drugs to do something that's important to you.  Preventing a relapse  Have a plan to deal with relapse. Learn to recognize changes in your thinking that lead you to drink or use drugs. Get help before you start to drink or use drugs again.  Try to stay away from situations, friends, or places that may lead you to drink or use drugs.  If you feel the need to drink alcohol or use drugs again, seek help right away. Call a trusted friend or family member. Some people get support from organizations such as Narcotics Anonymous or [employer] or from treatment facilities.  If you relapse, get help as soon as you can. Some people make a plan with another person that outlines what they want that person to do for them if they relapse. The plan usually includes how to handle the relapse and who to notify in case of relapse.  Don't give up. Remember that a relapse doesn't mean that you have failed. Use the experience to learn the triggers that lead you to drink or use drugs. Then quit again. Recovery is a lifelong process. Many people have several relapses before they are able to quit for good.  Follow-up care is a key part of your treatment and safety. Be sure to make and go to all appointments, and call your doctor if you are having problems. It's  "also a good idea to know your test results and keep a list of the medicines you take.  When should you call for help?   Call 911  anytime you think you may need emergency care. For example, call if you or someone else:    Has overdosed or has withdrawal signs. Be sure to tell the emergency workers that you are or someone else is using or trying to quit using drugs. Overdose or withdrawal signs may include:  Losing consciousness.  Seizure.  Seeing or hearing things that aren't there (hallucinations).     Is thinking or talking about suicide or harming others.   Where to get help 24 hours a day, 7 days a week   If you or someone you know talks about suicide, self-harm, a mental health crisis, a substance use crisis, or any other kind of emotional distress, get help right away. You can:    Call the Suicide and Crisis Lifeline at 988.     Call 2-997-976-TALK (1-731.397.6479).     Text HOME to 088054 to access the Crisis Text Line.   Consider saving these numbers in your phone.  Go to Dr. Tariff for more information or to chat online.  Call your doctor now or seek immediate medical care if:    You are having withdrawal symptoms. These may include nausea or vomiting, sweating, shakiness, and anxiety.   Watch closely for changes in your health, and be sure to contact your doctor if:    You have a relapse.     You need more help or support to stop.   Where can you learn more?  Go to https://www.[employer].net/patiented  Enter H573 in the search box to learn more about \"Substance Use Disorder: Care Instructions.\"  Current as of: August 20, 2024  Content Version: 14.4    4395-3534 [employer].   Care instructions adapted under license by your healthcare professional. If you have questions about a medical condition or this instruction, always ask your healthcare professional. [employer] disclaims any warranty or liability for your use of this information.       "

## 2025-04-08 NOTE — PROGRESS NOTES
Preventive Care Visit  [facility]  Krista Juares MD, Family Medicine  Apr 8, 2025  {Provider  Link to SmartSet :608142}    {PROVIDER CHARTING PREFERENCE:908258}    Eve Murry is a 49 year old, presenting for the following:  Physical        10/24/2024     1:03 PM   Additional Questions   Roomed by EKVIN Graham   Accompanied by n/a          HPI     Advance Care Planning  Patient does not have a Health Care Directive:       4/5/2025   General Health   How would you rate your overall physical health? (!) FAIR   Feel stress (tense, anxious, or unable to sleep) Only a little   (!) STRESS CONCERN      4/5/2025   Nutrition   Three or more servings of calcium each day? Yes   Diet: Low salt    Diabetic   How many servings of fruit and vegetables per day? (!) 2-3   How many sweetened beverages each day? 0-1       Multiple values from one day are sorted in reverse-chronological order         4/5/2025   Exercise   Days per week of moderate/strenous exercise 3 days   Average minutes spent exercising at this level 30 min         4/5/2025   Social Factors   Frequency of gathering with friends or relatives More than three times a week   Worry food won't last until get money to buy more No   Food not last or not have enough money for food? No   Do you have housing? (Housing is defined as stable permanent housing and does not include staying ouside in a car, in a tent, in an abandoned building, in an overnight shelter, or couch-surfing.) Yes   Are you worried about losing your housing? No   Lack of transportation? No   Unable to get utilities (heat,electricity)? No         4/5/2025   Dental   Dentist two times every year? Yes           10/19/2024   TB Screening   Were you born outside of the [place]? No         Today's PHQ-9 Score:       4/5/2025    12:05 PM   PHQ-9 SCORE   PHQ-9 Total Score MyChart 2 (Minimal depression)   PHQ-9 Total Score 2        Patient-reported         4/5/2025  "  Substance Use   Alcohol more than 3/day or more than 7/wk No   Do you use any other substances recreationally? (!) CANNABIS PRODUCTS     Social History     Tobacco Use    Smoking status: Never    Smokeless tobacco: Never    Tobacco comments:     Recreational CBD/THC gummies and topical cream   Vaping Use    Vaping status: Never Used   Substance Use Topics    Alcohol use: Yes     Comment: 1 drink per day    Drug use: No     {Provider  If there are gaps in the social history shown above, please follow the link to update and then refresh the note Link to Social and Substance History :895057}      4/5/2025   STI Screening   New sexual partner(s) since last STI/HIV test? No   ASCVD Risk   The 10-year ASCVD risk score (Tj PERSON, et al., 2019) is: 11%    Values used to calculate the score:      Age: 49 years      Sex: Male      Is Non- : Yes      Diabetic: Yes      Tobacco smoker: No      Systolic Blood Pressure: 110 mmHg      Is BP treated: Yes      HDL Cholesterol: 44 mg/dL      Total Cholesterol: 141 mg/dL        4/5/2025   Contraception/Family Planning   Questions about contraception or family planning No     {Provider  REQUIRED FOR AWV Use the storyboard to review patient history, after sections have been marked as reviewed, refresh note to capture documentation:963145}   Reviewed and updated as needed this visit by Provider                    {HISTORY OPTIONS (Optional):897286}    {ROS Picklists (Optional):667056}     Objective    Exam  There were no vitals taken for this visit.   Estimated body mass index is 31.58 kg/m  as calculated from the following:    Height as of 10/24/24: 1.788 m (5' 10.39\").    Weight as of 3/27/25: 101 kg (222 lb 9.6 oz).    Physical Exam  {Exam Choices (Optional):869448}        Signed Electronically by: Krista Juares MD  {Email feedback regarding this note to [email or web address]   :700948}  Answers submitted by the " patient for this visit:  Patient Health Questionnaire (Submitted on 4/5/2025)  If you checked off any problems, how difficult have these problems made it for you to do your work, take care of things at home, or get along with other people?: Not difficult at all  PHQ9 TOTAL SCORE: 2  Patient Health Questionnaire (G7) (Submitted on 4/5/2025)  SHARIF 7 TOTAL SCORE: 3

## 2025-04-08 NOTE — PROGRESS NOTES
Preventive Care Visit  [facility]  Krista Juares MD, Family Medicine  Apr 8, 2025      Assessment & Plan     1. Routine general medical examination at a health care facility (Primary)  Plan: prostate and colon cancer screening after age 50- no family history of either       He has requested acupuncture for hip and lower back pain  Referral is provided- patient has own appointment     2. Controlled type 2 diabetes mellitus without complication, without long-term current use of insulin (H)  Plan: A1c is at target & slightly higher  Metformin just increased to twice daily  He will be checking SMBG 2/wk  He is on ozempic.  Follow up for Diabetes  in 4 months as OV and A1c- no fasting needed    - lisinopril (ZESTRIL) 40 MG tablet; Take 1 tablet (40 mg) by mouth daily.  Dispense: 90 tablet; Refill: 3  - FOOT EXAM    Abnormal creatinine- avoid NSAID  Added cystatin for confirmation    - Basic metabolic panel  (Ca, Cl, CO2, Creat, Gluc, K, Na, BUN); Future  - Cystatin C with GFR; Future    3. Hypertension goal BP (blood pressure) < 130/80  BP is slightly high.  Has own Blood pressure monitor.  If >/=140/90 in future add BBlocker.  He did not tolerate hydrochlorothiazide/triamterene well    - lisinopril (ZESTRIL) 40 MG tablet; Take 1 tablet (40 mg) by mouth daily.  Dispense: 90 tablet; Refill: 3    4. Hyperlipidemia LDL goal <70  Plan: at target with diet and atorvastatin and refill is given for 1 yr  - atorvastatin (LIPITOR) 40 MG tablet; Take 1 tablet (40 mg) by mouth daily.  Dispense: 90 tablet; Refill: 3    5. Asthma, persistent controlled  Well controlled/ACT done   - albuterol (VENTOLIN HFA) 108 (90 Base) MCG/ACT inhaler; Inhale 1-2 puffs into the lungs every 6 hours as needed for shortness of breath or wheezing.  Dispense: 3 g; Refill: 3  - fluticasone-vilanterol (BREO ELLIPTA) 200-25 MCG/ACT inhaler; Inhale 1 puff into the lungs daily.  Dispense: 1 each; Refill: 11    6. Palpitations  Plan:  "proceed with zio patch to capture arrhythmia.  Stay well hydrated. Avoid caffine.  He does have mild anxiety and reports he is able to manage on own    - EKG 12-lead complete w/read - Clinics  - ZIO PATCH MAIL OUT; Future      Patient has been advised of split billing requirements and indicates understanding: Yes        BMI  Estimated body mass index is 38.79 kg/m  as calculated from the following:    Height as of this encounter: 1.64 m (5' 4.57\").    Weight as of this encounter: 104.3 kg (230 lb).   Weight management plan: Discussed healthy diet and exercise guidelines    Counseling  Appropriate preventive services were addressed with this patient via screening, questionnaire, or discussion as appropriate for fall prevention, nutrition, physical activity, Tobacco-use cessation, social engagement, weight loss and cognition.  Checklist reviewing preventive services available has been given to the patient.  Reviewed patient's diet, addressing concerns and/or questions.   He is at risk for lack of exercise and has been provided with information to increase physical activity for the benefit of his well-being.       See Patient Instructions    Subjective   Jeanmarie is a 49 year old, presenting for the following:  Physical        4/8/2025     8:00 AM   Additional Questions   Roomed by Kaylee HOLLOWAY MA   Accompanied by self         4/8/2025     8:00 AM   Patient Reported Additional Medications   Patient reports taking the following new medications none          HPI         Diabetes  management. Bedtime not been checking SMBG much- willing to start twice /wk. Metformn- just increased twice daily .  He has been on zempic for a few years      Nausea and racing heart- stopped Traimterene-HCTZ around april 1st due to nausea , that resolved within 48 hours, also experienced racing heart on and off .    Woke up in mid of night- with resting heart rate of 120- on smart watch- while visiting [facility]- and it resolved in sometime- it was not " associated with sob, chest pain or pressure. Did feel anxious - over all denies day to day anxiety. Denies dehydration      Wants referral for acupuncture- he has been getting for lower back pain and hip stain- denies daily use of NSAID.      Advance Care Planning  Patient does not have a Health Care Directive:       4/5/2025   General Health   How would you rate your overall physical health? (!) FAIR   Feel stress (tense, anxious, or unable to sleep) Only a little   (!) STRESS CONCERN      4/5/2025   Nutrition   Three or more servings of calcium each day? Yes   Diet: Low salt    Diabetic   How many servings of fruit and vegetables per day? (!) 2-3   How many sweetened beverages each day? 0-1       Multiple values from one day are sorted in reverse-chronological order         4/5/2025   Exercise   Days per week of moderate/strenous exercise 3 days   Average minutes spent exercising at this level 30 min         4/5/2025   Social Factors   Frequency of gathering with friends or relatives More than three times a week   Worry food won't last until get money to buy more No   Food not last or not have enough money for food? No   Do you have housing? (Housing is defined as stable permanent housing and does not include staying ouside in a car, in a tent, in an abandoned building, in an overnight shelter, or couch-surfing.) Yes   Are you worried about losing your housing? No   Lack of transportation? No   Unable to get utilities (heat,electricity)? No         4/5/2025   Dental   Dentist two times every year? Yes           10/19/2024   TB Screening   Were you born outside of the [place]? No         Today's PHQ-9 Score:       4/5/2025    12:05 PM   PHQ-9 SCORE   PHQ-9 Total Score MyChart 2 (Minimal depression)   PHQ-9 Total Score 2        Patient-reported         4/5/2025   Substance Use   Alcohol more than 3/day or more than 7/wk No   Do you use any other substances recreationally? (!) CANNABIS PRODUCTS     Social History  "    Tobacco Use    Smoking status: Never    Smokeless tobacco: Never    Tobacco comments:     Recreational CBD/THC gummies and topical cream   Vaping Use    Vaping status: Never Used   Substance Use Topics    Alcohol use: Yes     Comment: 1 drink per day    Drug use: No           4/5/2025   STI Screening   New sexual partner(s) since last STI/HIV test? No   ASCVD Risk   The 10-year ASCVD risk score (Tj PERSON, et al., 2019) is: 16%    Values used to calculate the score:      Age: 49 years      Sex: Male      Is Non- : Yes      Diabetic: Yes      Tobacco smoker: No      Systolic Blood Pressure: 136 mmHg      Is BP treated: Yes      HDL Cholesterol: 44 mg/dL      Total Cholesterol: 141 mg/dL        4/5/2025   Contraception/Family Planning   Questions about contraception or family planning No        Reviewed and updated as needed this visit by Provider   Tobacco  Allergies  Meds  Problems  Med Hx  Surg Hx  Fam Hx            BP Readings from Last 3 Encounters:   04/08/25 136/83   03/27/25 110/72   10/24/24 (!) 140/74    Wt Readings from Last 3 Encounters:   04/08/25 104.3 kg (230 lb)   03/27/25 101 kg (222 lb 9.6 oz)   10/24/24 103.7 kg (228 lb 11.2 oz)                      Review of Systems  Constitutional, HEENT, cardiovascular, pulmonary, GI, , musculoskeletal, neuro, skin, endocrine and psych systems are negative, except as otherwise noted.     Objective    Exam  /83 (BP Location: Left arm, Patient Position: Sitting, Cuff Size: Adult Regular)   Pulse 75   Temp 97.8  F (36.6  C) (Temporal)   Resp 16   Ht 1.64 m (5' 4.57\")   Wt 104.3 kg (230 lb)   SpO2 100%   BMI 38.79 kg/m     Estimated body mass index is 38.79 kg/m  as calculated from the following:    Height as of this encounter: 1.64 m (5' 4.57\").    Weight as of this encounter: 104.3 kg (230 lb).    Physical Exam  GENERAL: alert and no distress  EYES: Eyes grossly normal to inspection, PERRL and conjunctivae " and sclerae normal  HENT: ear canals and TM's normal, nose and mouth without ulcers or lesions  NECK: no adenopathy, no asymmetry, masses, or scars  RESP: lungs clear to auscultation - no rales, rhonchi or wheezes  CV: regular rate and rhythm, normal S1 S2, no S3 or S4, no murmur, click or rub, no peripheral edema  ABDOMEN: soft, nontender, no hepatosplenomegaly, no masses and bowel sounds normal  MS: no gross musculoskeletal defects noted, no edema  SKIN: no suspicious lesions or rashes  NEURO: Normal strength and tone, mentation intact and speech normal  PSYCH: mentation appears normal, affect normal/bright  Diabetic foot exam: normal DP and PT pulses, no trophic changes or ulcerative lesions, normal sensory exam, and normal monofilament exam        Signed Electronically by: Krista Juares MD    Answers submitted by the patient for this visit:  Patient Health Questionnaire (Submitted on 4/5/2025)  If you checked off any problems, how difficult have these problems made it for you to do your work, take care of things at home, or get along with other people?: Not difficult at all  PHQ9 TOTAL SCORE: 2  Patient Health Questionnaire (G7) (Submitted on 4/5/2025)  SHARIF 7 TOTAL SCORE: 3

## 2025-04-21 ASSESSMENT — ACTIVITIES OF DAILY LIVING (ADL)
LANDING: SLIGHT DIFFICULTY
GETTING INTO AND OUT OF AN AVERAGE CAR: SLIGHT DIFFICULTY
SITTING_FOR_15_MINUTES: SLIGHT DIFFICULTY
ABILITY_TO_PERFORM_ACTIVITY_WITH_YOUR_NORMAL_TECHNIQUE: MODERATE DIFFICULTY
WALKING_15_MINUTES_OR_GREATER: NO DIFFICULTY AT ALL
GOING_DOWN_1_FLIGHT_OF_STAIRS: NO DIFFICULTY AT ALL
RUNNING_ONE_MILE: SLIGHT DIFFICULTY
ROLLING OVER IN BED: SLIGHT DIFFICULTY
STANDING FOR 15 MINUTES: NO DIFFICULTY AT ALL
DEEP_SQUATTING: SLIGHT DIFFICULTY
WALKING_INITIALLY: NO DIFFICULTY AT ALL
TWISTING/PIVOTING ON INVOLVED LEG: SLIGHT DIFFICULTY
GOING UP 1 FLIGHT OF STAIRS: NO DIFFICULTY AT ALL
WALKING_DOWN_STEEP_HILLS: NO DIFFICULTY AT ALL
PUTTING_ON_SOCKS_AND_SHOES: MODERATE DIFFICULTY
SPORTS_SCORE(%): 0
ADL_HIGHEST_POTENTIAL_SCORE: 68
HOS_ADL_HIGHEST_POTENTIAL_SCORE: 68
SPORTS_COUNT: 9
SPORTS_HIGHEST_POTENTIAL_SCORE: 36
HOS_ADL_ITEM_SCORE_TOTAL: 61
JUMPING: SLIGHT DIFFICULTY
ADL_SCORE(%): 0
HOW_WOULD_YOU_RATE_YOUR_CURRENT_LEVEL_OF_FUNCTION?: NEARLY NORMAL
WALKING_UP_STEEP_HILLS: NO DIFFICULTY AT ALL
DEEP SQUATTING: SLIGHT DIFFICULTY
TWISTING/PIVOTING_ON_INVOLVED_LEG: SLIGHT DIFFICULTY
RECREATIONAL ACTIVITIES: SLIGHT DIFFICULTY
SITTING FOR 15 MINUTES: SLIGHT DIFFICULTY
PLEASE_INDICATE_YOR_PRIMARY_REASON_FOR_REFERRAL_TO_THERAPY:: HIP
ROLLING_OVER_IN_BED: SLIGHT DIFFICULTY
SPORTS_TOTAL_ITEM_SCORE: 0
HEAVY_WORK: NO DIFFICULTY AT ALL
STEPPING_UP_AND_DOWN_CURBS: NO DIFFICULTY AT ALL
STANDING_FOR_15_MINUTES: NO DIFFICULTY AT ALL
WALKING_DOWN_STEEP_HILLS: NO DIFFICULTY AT ALL
HEAVY_WORK: NO DIFFICULTY AT ALL
WALKING_UP_STEEP_HILLS: NO DIFFICULTY AT ALL
ADL_TOTAL_ITEM_SCORE: 0
LIGHT_TO_MODERATE_WORK: NO DIFFICULTY AT ALL
GETTING_INTO_AND_OUT_OF_A_BATHTUB: MODERATE DIFFICULTY
GETTING_INTO_AND_OUT_OF_A_BATHTUB: MODERATE DIFFICULTY
PUTTING ON SOCKS AND SHOES: MODERATE DIFFICULTY
ADL_COUNT: 17
WALKING_INITIALLY: NO DIFFICULTY AT ALL
WALKING_15_MINUTES_OR_GREATER: NO DIFFICULTY AT ALL
LIGHT_TO_MODERATE_WORK: NO DIFFICULTY AT ALL
STARTING_AND_STOPPING_QUICKLY: SLIGHT DIFFICULTY
ABILITY_TO_PARTICIPATE_IN_YOUR_DESIRED_SPORT_AS_LONG_AS_YOU_WOULD_LIKE: SLIGHT DIFFICULTY
CUTTING/LATERAL_MOVEMENTS: SLIGHT DIFFICULTY
HOS_ADL_SCORE(%): 89.71
WALKING_FOR_APPROXIMATELY_10_MINUTES: NO DIFFICULTY AT ALL
LOW_IMPACT_ACTIVITIES_LIKE_FAST_WALKING: NO DIFFICULTY AT ALL
GOING DOWN 1 FLIGHT OF STAIRS: NO DIFFICULTY AT ALL
GOING_UP_1_FLIGHT_OF_STAIRS: NO DIFFICULTY AT ALL
GETTING_INTO_AND_OUT_OF_AN_AVERAGE_CAR: SLIGHT DIFFICULTY
WALKING_APPROXIMATELY_10_MINUTES: NO DIFFICULTY AT ALL
STEPPING UP AND DOWN CURBS: NO DIFFICULTY AT ALL
RECREATIONAL_ACTIVITIES: SLIGHT DIFFICULTY

## 2025-04-24 ENCOUNTER — THERAPY VISIT (OUTPATIENT)
Dept: PHYSICAL THERAPY | Facility: CLINIC | Age: 50
End: 2025-04-24
Attending: FAMILY MEDICINE
Payer: COMMERCIAL

## 2025-04-24 DIAGNOSIS — S76.019S STRAIN OF HIP AND THIGH, UNSPECIFIED LATERALITY, SEQUELA: Primary | ICD-10-CM

## 2025-04-24 DIAGNOSIS — M25.651 STIFFNESS OF BOTH HIP JOINTS: ICD-10-CM

## 2025-04-24 DIAGNOSIS — M25.652 STIFFNESS OF BOTH HIP JOINTS: ICD-10-CM

## 2025-04-24 DIAGNOSIS — S76.919S STRAIN OF HIP AND THIGH, UNSPECIFIED LATERALITY, SEQUELA: Primary | ICD-10-CM

## 2025-04-24 NOTE — PROGRESS NOTES
PHYSICAL THERAPY EVALUATION  Type of Visit: Evaluation        Fall Risk Screen:  Have you fallen 2 or more times in the past year?: No  Have you fallen and had an injury in the past year?: No    Subjective         Presenting condition or subjective complaint: Hip pain chronic; L > R. Limited range of motion, intermittent aching. Sometimes painful at night.  Date of onset: 04/08/25    Relevant medical history: Asthma; Diabetes; Overweight   Dates & types of surgery: Ankle 12 yrs old, chest fluid removal 15 yrs old    Prior diagnostic imaging/testing results:       Prior therapy history for the same diagnosis, illness or injury: Yes Acupuncture ongoing      Living Environment  Social support: With family members   Type of home: House; 2-story; Basement   Stairs to enter the home: Yes 5 Is there a railing: Yes     Ramp: No   Stairs inside the home: Yes 15 Is there a railing: Yes    Help at home: None  Equipment owned:     Employment: Yes   Hobbies/Interests: Travel, walking  Patient goals for therapy: Rang of motion    Pain assessment: See objective evaluation for additional pain details     Objective   PAIN:   Pain Location: L > R anterior hip  Pain Quality: Aching  Pain is Exacerbated By: prolonged sitting, stiffness limits activity  Pain is Relieved By: heat, stretch, and accupuncture    Gait:       Weight Bearing Status:  WBAT                  Lumbar/SI Evaluation  ROM:    AROM Lumbar:   Flexion:              Mod - with B HS tightness and L post hip soreness  Ext:                    Mod -   Side Bend:        Left:  Mod -    Right:  Major - with L side soreness  Rotation:           Left:     Right:   Side Glide:        Left:     Right:                                                               Hip Evaluation    Hip PROM:    Flexion: Left: bias into abd   Right: bias into  abd  Extension: Left: - 20   Right: -10    Adduction: Left: unable to add to midline    Right: add to neutral  Internal Rotation:  Left: unable to achieve neutral    Right: 5                Hip Strength:        Abduction:  Left:  4-/5      Pain:weak/painfulRight:  5-/5     Pain:strong/painful  Adduction:  Left: /5     Pain:weak/painful                    Hip Palpation:    Left hip tenderness present at:   hip flexors (TFL) and Iliac Crest  Right hip tenderness present at:  hip flexors  Functional Testing:           Quad:      Bilateral leg squat:  Dec depth  Mild loss of control                Assessment & Plan   CLINICAL IMPRESSIONS  Medical Diagnosis: Hip pain    Treatment Diagnosis: Chronic hip pain   Impression/Assessment: Patient is a 49 year old male with bilateral L > R complaints.  The following significant findings have been identified: Pain, Decreased ROM/flexibility, Decreased joint mobility, Decreased strength, Impaired gait, and Decreased activity tolerance. These impairments interfere with their ability to perform self care tasks, household chores, driving , household mobility, and community mobility as compared to previous level of function.     Clinical Decision Making (Complexity):  Clinical Presentation: Stable/Uncomplicated  Clinical Presentation Rationale: based on medical and personal factors listed in PT evaluation  Clinical Decision Making (Complexity): Low complexity    PLAN OF CARE  Treatment Interventions:  Interventions: Gait Training, Manual Therapy, Neuromuscular Re-education, Therapeutic Activity, Therapeutic Exercise, Self-Care/Home Management    Long Term Goals     PT Goal 1  Goal Identifier: Ambulation  Goal Description: 30  minute walk w/o deviation d/t L hip pain  Rationale: to maximize safety and independence with performance of ADLs and functional tasks;to maximize safety and independence within the home;to maximize safety and independence within the community  Target Date: 07/23/25      Frequency of Treatment: wkly to bi-monthly  Duration of Treatment: up to 90d    Education Assessment:   Learner/Method:  Patient  Education Comments: edu  on findings, prognosis and plan of care. May benefit from further imaging if sx do not improve    Risks and benefits of evaluation/treatment have been explained.   Patient/Family/caregiver agrees with Plan of Care.     Evaluation Time:     PT Eval, Low Complexity Minutes (80156): 15       Signing Clinician: Magalys Hinton PT

## 2025-04-28 LAB — CV ZIO PRELIM RESULTS: NORMAL

## 2025-05-08 ENCOUNTER — THERAPY VISIT (OUTPATIENT)
Dept: PHYSICAL THERAPY | Facility: CLINIC | Age: 50
End: 2025-05-08
Payer: COMMERCIAL

## 2025-05-08 DIAGNOSIS — S76.919S STRAIN OF HIP AND THIGH, UNSPECIFIED LATERALITY, SEQUELA: Primary | ICD-10-CM

## 2025-05-08 DIAGNOSIS — M25.652 STIFFNESS OF BOTH HIP JOINTS: ICD-10-CM

## 2025-05-08 DIAGNOSIS — M25.651 STIFFNESS OF BOTH HIP JOINTS: ICD-10-CM

## 2025-05-08 DIAGNOSIS — S76.019S STRAIN OF HIP AND THIGH, UNSPECIFIED LATERALITY, SEQUELA: Primary | ICD-10-CM

## 2025-05-15 ENCOUNTER — THERAPY VISIT (OUTPATIENT)
Dept: PHYSICAL THERAPY | Facility: CLINIC | Age: 50
End: 2025-05-15
Payer: COMMERCIAL

## 2025-05-15 DIAGNOSIS — S76.919S STRAIN OF HIP AND THIGH, UNSPECIFIED LATERALITY, SEQUELA: Primary | ICD-10-CM

## 2025-05-15 DIAGNOSIS — M25.652 STIFFNESS OF BOTH HIP JOINTS: ICD-10-CM

## 2025-05-15 DIAGNOSIS — M25.651 STIFFNESS OF BOTH HIP JOINTS: ICD-10-CM

## 2025-05-15 DIAGNOSIS — S76.019S STRAIN OF HIP AND THIGH, UNSPECIFIED LATERALITY, SEQUELA: Primary | ICD-10-CM

## 2025-05-22 ENCOUNTER — THERAPY VISIT (OUTPATIENT)
Dept: PHYSICAL THERAPY | Facility: CLINIC | Age: 50
End: 2025-05-22
Payer: COMMERCIAL

## 2025-05-22 DIAGNOSIS — S76.019S STRAIN OF HIP AND THIGH, UNSPECIFIED LATERALITY, SEQUELA: Primary | ICD-10-CM

## 2025-05-22 DIAGNOSIS — M25.652 STIFFNESS OF BOTH HIP JOINTS: ICD-10-CM

## 2025-05-22 DIAGNOSIS — M25.651 STIFFNESS OF BOTH HIP JOINTS: ICD-10-CM

## 2025-05-22 DIAGNOSIS — S76.919S STRAIN OF HIP AND THIGH, UNSPECIFIED LATERALITY, SEQUELA: Primary | ICD-10-CM

## 2025-06-16 ENCOUNTER — PATIENT OUTREACH (OUTPATIENT)
Dept: CARE COORDINATION | Facility: CLINIC | Age: 50
End: 2025-06-16
Payer: COMMERCIAL

## 2025-08-27 ENCOUNTER — MYC MEDICAL ADVICE (OUTPATIENT)
Dept: FAMILY MEDICINE | Facility: CLINIC | Age: 50
End: 2025-08-27
Payer: COMMERCIAL

## 2025-08-27 DIAGNOSIS — E11.9 CONTROLLED TYPE 2 DIABETES MELLITUS WITHOUT COMPLICATION, WITHOUT LONG-TERM CURRENT USE OF INSULIN (H): Primary | ICD-10-CM

## 2025-08-27 DIAGNOSIS — E11.9 CONTROLLED TYPE 2 DIABETES MELLITUS WITHOUT COMPLICATION, WITHOUT LONG-TERM CURRENT USE OF INSULIN (H): ICD-10-CM

## 2025-08-28 ENCOUNTER — LAB (OUTPATIENT)
Dept: LAB | Facility: CLINIC | Age: 50
End: 2025-08-28
Payer: COMMERCIAL

## 2025-08-28 ENCOUNTER — OFFICE VISIT (OUTPATIENT)
Dept: PHARMACY | Facility: CLINIC | Age: 50
End: 2025-08-28
Payer: COMMERCIAL

## 2025-08-28 VITALS — SYSTOLIC BLOOD PRESSURE: 128 MMHG | WEIGHT: 228.3 LBS | DIASTOLIC BLOOD PRESSURE: 87 MMHG | BODY MASS INDEX: 38.5 KG/M2

## 2025-08-28 DIAGNOSIS — Z78.9 TAKES DIETARY SUPPLEMENTS: ICD-10-CM

## 2025-08-28 DIAGNOSIS — J45.998 ASTHMA, PERSISTENT CONTROLLED: ICD-10-CM

## 2025-08-28 DIAGNOSIS — E78.2 MIXED HYPERLIPIDEMIA: ICD-10-CM

## 2025-08-28 DIAGNOSIS — E11.9 CONTROLLED TYPE 2 DIABETES MELLITUS WITHOUT COMPLICATION, WITHOUT LONG-TERM CURRENT USE OF INSULIN (H): ICD-10-CM

## 2025-08-28 DIAGNOSIS — I10 BENIGN ESSENTIAL HYPERTENSION: ICD-10-CM

## 2025-08-28 DIAGNOSIS — I10 HYPERTENSION GOAL BP (BLOOD PRESSURE) < 130/80: ICD-10-CM

## 2025-08-28 DIAGNOSIS — E11.9 CONTROLLED TYPE 2 DIABETES MELLITUS WITHOUT COMPLICATION, WITHOUT LONG-TERM CURRENT USE OF INSULIN (H): Primary | ICD-10-CM

## 2025-08-28 LAB
ANION GAP SERPL CALCULATED.3IONS-SCNC: 12 MMOL/L (ref 7–15)
BUN SERPL-MCNC: 13.9 MG/DL (ref 6–20)
CALCIUM SERPL-MCNC: 10 MG/DL (ref 8.8–10.4)
CHLORIDE SERPL-SCNC: 103 MMOL/L (ref 98–107)
CREAT SERPL-MCNC: 1.01 MG/DL (ref 0.67–1.17)
EGFRCR SERPLBLD CKD-EPI 2021: >90 ML/MIN/1.73M2
EST. AVERAGE GLUCOSE BLD GHB EST-MCNC: 126 MG/DL
GLUCOSE SERPL-MCNC: 133 MG/DL (ref 70–99)
HBA1C MFR BLD: 6 % (ref 0–5.6)
HCO3 SERPL-SCNC: 24 MMOL/L (ref 22–29)
POTASSIUM SERPL-SCNC: 3.9 MMOL/L (ref 3.4–5.3)
SODIUM SERPL-SCNC: 139 MMOL/L (ref 135–145)

## 2025-08-28 RX ORDER — METFORMIN HYDROCHLORIDE 500 MG/1
TABLET, EXTENDED RELEASE ORAL
Qty: 270 TABLET | Refills: 1 | OUTPATIENT
Start: 2025-08-28

## (undated) RX ORDER — FENTANYL CITRATE 50 UG/ML
INJECTION, SOLUTION INTRAMUSCULAR; INTRAVENOUS
Status: DISPENSED
Start: 2022-06-06